# Patient Record
Sex: MALE | Race: WHITE | NOT HISPANIC OR LATINO | Employment: FULL TIME | URBAN - METROPOLITAN AREA
[De-identification: names, ages, dates, MRNs, and addresses within clinical notes are randomized per-mention and may not be internally consistent; named-entity substitution may affect disease eponyms.]

---

## 2018-04-18 ENCOUNTER — COMMUNICATION - HEALTHEAST (OUTPATIENT)
Dept: TELEHEALTH | Facility: CLINIC | Age: 58
End: 2018-04-18

## 2018-05-20 ENCOUNTER — OFFICE VISIT (OUTPATIENT)
Dept: URGENT CARE | Facility: URGENT CARE | Age: 58
End: 2018-05-20
Payer: COMMERCIAL

## 2018-05-20 ENCOUNTER — NURSE TRIAGE (OUTPATIENT)
Dept: NURSING | Facility: CLINIC | Age: 58
End: 2018-05-20

## 2018-05-20 VITALS
OXYGEN SATURATION: 99 % | HEART RATE: 71 BPM | TEMPERATURE: 97.2 F | WEIGHT: 230.8 LBS | DIASTOLIC BLOOD PRESSURE: 100 MMHG | SYSTOLIC BLOOD PRESSURE: 193 MMHG

## 2018-05-20 DIAGNOSIS — L24.9 IRRITANT CONTACT DERMATITIS, UNSPECIFIED TRIGGER: Primary | ICD-10-CM

## 2018-05-20 DIAGNOSIS — I16.0 HYPERTENSIVE URGENCY: ICD-10-CM

## 2018-05-20 PROCEDURE — 99204 OFFICE O/P NEW MOD 45 MIN: CPT | Performed by: PHYSICIAN ASSISTANT

## 2018-05-20 RX ORDER — TRIAMCINOLONE ACETONIDE 1 MG/G
CREAM TOPICAL
Qty: 453 G | Refills: 0 | Status: SHIPPED | OUTPATIENT
Start: 2018-05-20 | End: 2018-12-23

## 2018-05-20 ASSESSMENT — ENCOUNTER SYMPTOMS
HEADACHES: 0
DIARRHEA: 0
VOMITING: 0
ABDOMINAL PAIN: 0
NAUSEA: 0
SHORTNESS OF BREATH: 0
FOCAL WEAKNESS: 0
FEVER: 0
CHILLS: 0

## 2018-05-20 NOTE — PROGRESS NOTES
"HPI  May 20, 2018    HPI: Abhi Pabon is a 57 year old male with hx anxiety who complains of moderate rash to back, neck, arms, & legs onset 6 days ago. Symptoms began after pt sprayed his mattress & sheets with Raid b/c he thought he had bedbugs in his apartment. His apartment was also fumigated & it turned out he did not have bedbugs. Pt has been applying hydrocortisone cream & Vanicream and rash is slowly improving. He is also sleeping on an air mattress with new sheets. Pt reports some itching but not anymore, there is a mild burning/stinging sensation. Denies fever/chills, throat/tongue swelling, HA, CP, SOB, abd pain, N/V/D, rash, or any other symptoms.     Pt's BP elevated today. He took his lisinopril this AM (unsure of dose). He reports usually his BP is very well controlled but he hasn't been as good about taking his medication over the past week since dealing with the above issues. Used to be on lisinopril-HCTZ combo pill but HCTZ was stopped due to \"Kidney damage\". He does have a PCP. He denies dizziness, vision changes, HA, CP, or SOB.    Past Medical History:   Diagnosis Date     Dyslexia      Hypertension      History reviewed. No pertinent surgical history.  Social History   Substance Use Topics     Smoking status: Never Smoker     Smokeless tobacco: Never Used     Alcohol use Yes     Patient Active Problem List   Diagnosis     Hypertension goal BP (blood pressure) < 140/90     Irritant contact dermatitis, unspecified trigger     Eczema, unspecified type     Family History   Problem Relation Age of Onset     Hypertension Mother      Ovarian Cancer Mother      DIABETES Father      Hypertension Father      CANCER Father      Cervical Cancer Sister      Anxiety Disorder Sister      CEREBROVASCULAR DISEASE Sister      HEART DISEASE Sister      Anxiety Disorder Sister         Problem list, Medication list, Allergies, and Medical/Social/Surgical histories reviewed in EPIC and updated as " appropriate.      Review of Systems   Constitutional: Negative for chills and fever.   Respiratory: Negative for shortness of breath.    Cardiovascular: Negative for chest pain.   Gastrointestinal: Negative for abdominal pain, diarrhea, nausea and vomiting.   Skin: Positive for rash.   Neurological: Negative for focal weakness and headaches.   All other systems reviewed and are negative.        Physical Exam   Constitutional: He is oriented to person, place, and time and well-developed, well-nourished, and in no distress.   HENT:   Head: Normocephalic and atraumatic.   Cardiovascular: Normal rate, regular rhythm and normal heart sounds.    Pulmonary/Chest: Effort normal and breath sounds normal.   Musculoskeletal: Normal range of motion.   Neurological: He is alert and oriented to person, place, and time. Gait normal.   Skin: Skin is warm and dry. Rash (dry, flaking skin with excoriation and erythema to bilateral posterior arms, front of neck, and sparingly to anterior lower legs & lower back) noted.   Nursing note and vitals reviewed.      Vital Signs  BP (!) 193/100 (BP Location: Left arm, Patient Position: Chair, Cuff Size: Adult Regular)  Pulse 71  Temp 97.2  F (36.2  C) (Oral)  Wt 230 lb 12.8 oz (104.7 kg)  SpO2 99%     Diagnostic Test Results:  none     ASSESSMENT/PLAN      ICD-10-CM    1. Irritant contact dermatitis, unspecified trigger L24.9 triamcinolone (KENALOG) 0.1 % cream   2. Hypertensive urgency I16.0       Suspect irritant contact dermatitis. Recommend less frequent showers & when do shower use cool or lukewarm water, will Rx triamcinolone, apply Vanicream several times/day.     BP very high- advised pt be seen by PCP ASAP, ideally tomorrow, for evaluation of his BP meds. Unable to make adjustments today b/c pt unaware of dose of lisinopril. He is asymptomatic at this time. Anxiety may be playing a role in his BP also.      I have discussed any lab or imaging results, the patient's diagnosis, and  my plan of treatment with the patient and/or family. Patient is aware to come back in if with worsening symptoms or if no relief despite treatment plan.  Patient voiced understanding and had no further questions.       Follow Up: Return in about 1 day (around 5/21/2018).    AMY Silva, PA-C  [facility]

## 2018-05-20 NOTE — MR AVS SNAPSHOT
After Visit Summary   5/20/2018    Abhi Pabon    MRN: [id number]           Patient Information     Date Of Birth          1960        Visit Information        Provider Department      5/20/2018 4:05 PM Stefanie Velasquez PA-C [facility]        Today's Diagnoses     Irritant contact dermatitis, unspecified trigger    -  1    Benign essential hypertension           Follow-ups after your visit        Follow-up notes from your care team     Return in about 1 day (around 5/21/2018).      Your next 10 appointments already scheduled     May 21, 2018  2:20 PM CDT   Office Visit with Kristopher Cook MD   [facility] [place] ([address] [address] [phone]           Bring a current list of meds and any records pertaining to this visit. For Physicals, please bring immunization records and any forms needing to be filled out. Please arrive 10 minutes early to complete paperwork.            Jun 05, 2018 10:40 AM CDT   Office Visit with Kristopher Cook MD   [facility] [place] ([address] [id number] [phone]           Bring a current list of meds and any records pertaining to this visit. For Physicals, please bring immunization records and any forms needing to be filled out. Please arrive 10 minutes early to complete paperwork.            Jun 19, 2018 10:40 AM CDT   Office Visit with Krsitopher Cook MD   [facility] [place] ([address] [address] [phone]           Bring a current list of meds and any records pertaining to this visit. For Physicals, please bring immunization records and any forms needing to be filled out. Please arrive 10 minutes early to complete paperwork.              Who to contact     If you have questions  "or need follow up information about today's clinic visit or your schedule please contact [facility] directly at [phone].  Normal or non-critical lab and imaging results will be communicated to you by MyChart, letter or phone within 4 business days after the clinic has received the results. If you do not hear from us within 7 days, please contact the clinic through [facility] or phone. If you have a critical or abnormal lab result, we will notify you by phone as soon as possible.  Submit refill requests through [facility] or call your pharmacy and they will forward the refill request to us. Please allow 3 business days for your refill to be completed.          Additional Information About Your Visit        [employer] Information     [facility] lets you send messages to your doctor, view your test results, renew your prescriptions, schedule appointments and more. To sign up, go to [email or web address] . Click on \"Log in\" on the left side of the screen, which will take you to the Welcome page. Then click on \"Sign up Now\" on the right side of the page.     You will be asked to enter the access code listed below, as well as some personal information. Please follow the directions to create your username and password.     Your access code is: [email or web address]  Expires: 2018  5:01 PM     Your access code will  in 90 days. If you need help or a new code, please call your [facility] clinic or [phone].        Care EveryWhere ID     This is your Care EveryWhere ID. This could be used by other organizations to access your [facility] medical records  [id number]        Your Vitals Were     Pulse Temperature Pulse Oximetry             71 97.2  F (36.2  C) (Oral) 99%          Blood Pressure from Last 3 Encounters:   18 (!) 193/100    Weight from Last 3 Encounters:   18 230 lb 12.8 oz (104.7 kg)              Today, you had the following     No orders found for display         Today's Medication " Changes          These changes are accurate as of 5/20/18  5:01 PM.  If you have any questions, ask your nurse or doctor.               Start taking these medicines.        Dose/Directions    triamcinolone 0.1 % cream   Commonly known as:  KENALOG   Used for:  Irritant contact dermatitis, unspecified trigger   Started by:  Stefanie Velasquez PA-C        Apply sparingly to affected area tid prn.   Quantity:  453 g   Refills:  0            Where to get your medicines      These medications were sent to [facility] [place] - [address]  [address], [place] [address]     Phone:  [phone]     triamcinolone 0.1 % cream                Primary Care Provider    None Specified       No primary provider on file.        Equal Access to Services     TRACI PELAYO : Bhargavi hansen Sogeorge, waaxda luqadaha, qaybta kaalmada adeegyada, ti monteiro . So [facility] [phone].    ATENCIÓN: Si habla español, tiene a perez disposición servicios gratuitos de asistencia lingüística. Llame al [phone].    We comply with applicable federal civil rights laws and [place] laws. We do not discriminate on the basis of race, color, national origin, age, disability, sex, sexual orientation, or gender identity.            Thank you!     Thank you for choosing [facility]  for your care. Our goal is always to provide you with excellent care. Hearing back from our patients is one way we can continue to improve our services. Please take a few minutes to complete the written survey that you may receive in the mail after your visit with us. Thank you!             Your Updated Medication List - Protect others around you: Learn how to safely use, store and throw away your medicines at www.disposemymeds.org.          This list is accurate as of 5/20/18  5:01 PM.  Always use your most recent med list.                   Brand Name Dispense  Instructions for use Diagnosis    LISINOPRIL PO           triamcinolone 0.1 % cream    KENALOG    453 g    Apply sparingly to affected area tid prn.    Irritant contact dermatitis, unspecified trigger

## 2018-05-20 NOTE — TELEPHONE ENCOUNTER
Sister is caller. She is not with the patient. Reports that the patient thought that he had bedbugs in his apartment. He sprayed Raid on his mattress and sheets. He then slept in the bed. He developed a rash on his arms, legs and back. His apartment was fumigated. It turns out that he did not have bedbugs. Reports her brother has dyslexia and anxiety. He has signed a form indicating that the caller and another sibling can have access to his medical information for the next year. Reports that she told her brother to bathe and he did. She applied hydrocortisone cream to his rash and she thinks the rash is getting better. All of her brother's bedding has been removed. He is now sleeping on a air mattress with new bedding. Caller said she wants to get medical help for her brother and to request that neurological testing be done. Triage not done, as the caller is not with the patient. She was transferred to the scheduling line.  Laura Jacobo RN/FNA

## 2018-05-21 ENCOUNTER — OFFICE VISIT (OUTPATIENT)
Dept: FAMILY MEDICINE | Facility: CLINIC | Age: 58
End: 2018-05-21
Payer: COMMERCIAL

## 2018-05-21 VITALS
WEIGHT: 230 LBS | BODY MASS INDEX: 32.93 KG/M2 | SYSTOLIC BLOOD PRESSURE: 164 MMHG | DIASTOLIC BLOOD PRESSURE: 79 MMHG | TEMPERATURE: 97.7 F | HEIGHT: 70 IN | HEART RATE: 77 BPM

## 2018-05-21 DIAGNOSIS — Z23 NEED FOR PROPHYLACTIC VACCINATION WITH TETANUS-DIPHTHERIA (TD): ICD-10-CM

## 2018-05-21 DIAGNOSIS — Z11.59 NEED FOR HEPATITIS C SCREENING TEST: ICD-10-CM

## 2018-05-21 DIAGNOSIS — F41.9 ANXIETY: ICD-10-CM

## 2018-05-21 DIAGNOSIS — I10 HYPERTENSION GOAL BP (BLOOD PRESSURE) < 140/90: Primary | ICD-10-CM

## 2018-05-21 DIAGNOSIS — Z12.11 SCREEN FOR COLON CANCER: ICD-10-CM

## 2018-05-21 DIAGNOSIS — L30.9 ECZEMA, UNSPECIFIED TYPE: ICD-10-CM

## 2018-05-21 PROBLEM — L24.9 IRRITANT CONTACT DERMATITIS, UNSPECIFIED TRIGGER: Status: ACTIVE | Noted: 2018-05-21

## 2018-05-21 PROCEDURE — 36415 COLL VENOUS BLD VENIPUNCTURE: CPT | Performed by: FAMILY MEDICINE

## 2018-05-21 PROCEDURE — 80048 BASIC METABOLIC PNL TOTAL CA: CPT | Performed by: FAMILY MEDICINE

## 2018-05-21 PROCEDURE — 99214 OFFICE O/P EST MOD 30 MIN: CPT | Performed by: FAMILY MEDICINE

## 2018-05-21 RX ORDER — AMLODIPINE BESYLATE 5 MG/1
5 TABLET ORAL DAILY
Qty: 90 TABLET | Refills: 3 | Status: SHIPPED | OUTPATIENT
Start: 2018-05-21 | End: 2018-10-18

## 2018-05-21 RX ORDER — LISINOPRIL 20 MG/1
20 TABLET ORAL 2 TIMES DAILY
Qty: 180 TABLET | Refills: 3 | Status: SHIPPED | OUTPATIENT
Start: 2018-05-21 | End: 2018-10-18

## 2018-05-21 RX ORDER — TRIAMCINOLONE ACETONIDE 1 MG/G
CREAM TOPICAL
Qty: 453 G | Refills: 0 | Status: CANCELLED | OUTPATIENT
Start: 2018-05-21

## 2018-05-21 ASSESSMENT — ANXIETY QUESTIONNAIRES
5. BEING SO RESTLESS THAT IT IS HARD TO SIT STILL: NOT AT ALL
1. FEELING NERVOUS, ANXIOUS, OR ON EDGE: SEVERAL DAYS
3. WORRYING TOO MUCH ABOUT DIFFERENT THINGS: SEVERAL DAYS
7. FEELING AFRAID AS IF SOMETHING AWFUL MIGHT HAPPEN: SEVERAL DAYS
GAD7 TOTAL SCORE: 6
2. NOT BEING ABLE TO STOP OR CONTROL WORRYING: SEVERAL DAYS
6. BECOMING EASILY ANNOYED OR IRRITABLE: SEVERAL DAYS

## 2018-05-21 ASSESSMENT — PATIENT HEALTH QUESTIONNAIRE - PHQ9: 5. POOR APPETITE OR OVEREATING: SEVERAL DAYS

## 2018-05-21 NOTE — MR AVS SNAPSHOT
After Visit Summary   5/21/2018    Abhi Pabon    MRN: [id number]           Patient Information     Date Of Birth          1960        Visit Information        Provider Department      5/21/2018 2:20 PM Kristopher Cook MD [facility]        Today's Diagnoses     Hypertension goal BP (blood pressure) < 140/90    -  1    Eczema, unspecified type        Need for hepatitis C screening test        Anxiety        Need for prophylactic vaccination with tetanus-diphtheria (TD)        Screen for colon cancer          Care Instructions    Orders Placed This Encounter     Basic metabolic panel     DERMATOLOGY REFERRAL     Referral Type:   Consultation     amLODIPine (NORVASC) 5 MG tablet     Sig: Take 1 tablet (5 mg) by mouth daily     Dispense:  90 tablet     Refill:  3     lisinopril (PRINIVIL/ZESTRIL) 20 MG tablet     Sig: Take 1 tablet (20 mg) by mouth 2 times daily     Dispense:  180 tablet     Refill:  3     Follow up for physical exam in 3-4 weeks          Follow-ups after your visit        Additional Services     DERMATOLOGY REFERRAL       Your provider has referred you to: N[facility] [phone]   [email or web address]    Please be aware that coverage of these services is subject to the terms and limitations of your health insurance plan.  Call member services at your health plan with any benefit or coverage questions.      Please bring the following with you to your appointment:    (1) Any X-Rays, CTs or MRIs which have been performed.  Contact the facility where they were done to arrange for  prior to your scheduled appointment.    (2) List of current medications  (3) This referral request   (4) Any documents/labs given to you for this referral                  Your next 10 appointments already scheduled     Jun 05, 2018 10:40 AM CDT   Office Visit with Kristopher Cook MD   [facility]  "([facility])    [address] [address] [facility] [address] [phone]           Bring a current list of meds and any records pertaining to this visit. For Physicals, please bring immunization records and any forms needing to be filled out. Please arrive 10 minutes early to complete paperwork.            2018  9:20 AM CDT   PHYSICAL with Kristopher Cook MD   [facility] ([facility]    [address] [address] [facility] [address] [phone]              Who to contact     If you have questions or need follow up information about today's clinic visit or your schedule please contact [facility] directly at [phone].  Normal or non-critical lab and imaging results will be communicated to you by MyChart, letter or phone within 4 business days after the clinic has received the results. If you do not hear from us within 7 days, please contact the clinic through MyChart or phone. If you have a critical or abnormal lab result, we will notify you by phone as soon as possible.  Submit refill requests through [facility] or call your pharmacy and they will forward the refill request to us. Please allow 3 business days for your refill to be completed.          Additional Information About Your Visit        [employer] Information     [facility] lets you send messages to your doctor, view your test results, renew your prescriptions, schedule appointments and more. To sign up, go to [email or web address] . Click on \"Log in\" on the left side of the screen, which will take you to the Welcome page. Then click on \"Sign up Now\" on the right side of the page.     You will be asked to enter the access code listed below, as well as some personal information. Please follow the directions to create your username and password.     Your access code is: [email or web address]  Expires: 2018  5:01 PM     Your access code will  in 90 days. If " "you need help or a new code, please call your [facility] clinic or [phone].        Care EveryWhere ID     This is your Care EveryWhere ID. This could be used by other organizations to access your [facility] medical records  [id number]        Your Vitals Were     Pulse Temperature Height BMI (Body Mass Index)          77 97.7  F (36.5  C) (Oral) 5' 10\" (1.778 m) 33 kg/m2         Blood Pressure from Last 3 Encounters:   05/21/18 164/79   05/20/18 (!) 193/100    Weight from Last 3 Encounters:   05/21/18 230 lb (104.3 kg)   05/20/18 230 lb 12.8 oz (104.7 kg)              We Performed the Following     Basic metabolic panel     DERMATOLOGY REFERRAL          Today's Medication Changes          These changes are accurate as of 5/21/18  3:44 PM.  If you have any questions, ask your nurse or doctor.               Start taking these medicines.        Dose/Directions    amLODIPine 5 MG tablet   Commonly known as:  NORVASC   Used for:  Hypertension goal BP (blood pressure) < 140/90   Started by:  Kristopher Cook MD        Dose:  5 mg   Take 1 tablet (5 mg) by mouth daily   Quantity:  90 tablet   Refills:  3         These medicines have changed or have updated prescriptions.        Dose/Directions    * LISINOPRIL PO   This may have changed:  Another medication with the same name was added. Make sure you understand how and when to take each.   Changed by:  Kristopher Cook MD        Refills:  0       * lisinopril 20 MG tablet   Commonly known as:  PRINIVIL/ZESTRIL   This may have changed:  You were already taking a medication with the same name, and this prescription was added. Make sure you understand how and when to take each.   Used for:  Hypertension goal BP (blood pressure) < 140/90   Changed by:  Kristopher Cook MD        Dose:  20 mg   Take 1 tablet (20 mg) by mouth 2 times daily   Quantity:  180 tablet   Refills:  3       * Notice:  This list has 2 medication(s) that are the same as other " medications prescribed for you. Read the directions carefully, and ask your doctor or other care provider to review them with you.         Where to get your medicines      These medications were sent to [facility] - [place], [place] - [address] [place] [address].  [address] [place] [address]., [facility] [address]     Phone:  [phone]     amLODIPine 5 MG tablet    lisinopril 20 MG tablet                Primary Care Provider Office Phone # Fax #    [facility] [phone]       [address] [place] [address]        Equal Access to Services     TRACI PELAYO : Hadii aad ku hadasho Soomaali, waaxda luqadaha, qaybta kaalmada adeegyada, ti monteiro . So [facility] [phone].    ATENCIÓN: Si habla español, tiene a perez disposición servicios gratuitos de asistencia lingüística. [facility] [phone].    We comply with applicable federal civil rights laws and [place] laws. We do not discriminate on the basis of race, color, national origin, age, disability, sex, sexual orientation, or gender identity.            Thank you!     Thank you for choosing [facility]  for your care. Our goal is always to provide you with excellent care. Hearing back from our patients is one way we can continue to improve our services. Please take a few minutes to complete the written survey that you may receive in the mail after your visit with us. Thank you!             Your Updated Medication List - Protect others around you: Learn how to safely use, store and throw away your medicines at www.disposemymeds.org.          This list is accurate as of 5/21/18  3:44 PM.  Always use your most recent med list.                   Brand Name Dispense Instructions for use Diagnosis    amLODIPine 5 MG tablet    NORVASC    90 tablet    Take 1 tablet (5 mg) by mouth daily    Hypertension goal BP (blood pressure) < 140/90       * LISINOPRIL PO           *  lisinopril 20 MG tablet    PRINIVIL/ZESTRIL    180 tablet    Take 1 tablet (20 mg) by mouth 2 times daily    Hypertension goal BP (blood pressure) < 140/90       triamcinolone 0.1 % cream    KENALOG    453 g    Apply sparingly to affected area tid prn.    Irritant contact dermatitis, unspecified trigger       * Notice:  This list has 2 medication(s) that are the same as other medications prescribed for you. Read the directions carefully, and ask your doctor or other care provider to review them with you.

## 2018-05-21 NOTE — PROGRESS NOTES
SUBJECTIVE:   Abhi Pabon is a 57 year old male who presents to clinic today for the following health issues:      New Patient/Transfer of Care  ED/UC Followup:    Facility:  [facility] [place]   Date of visit: 5/20/18  Reason for visit: Irritant contact dermatitis   Current Status: Kenalog has been helpful, patient feels the burns are improving.      Hypertension Follow-up      Outpatient blood pressures are being checked at home. 130 to 150 of 60's to 90's      Low Salt Diet: not monitoring salt      Amount of exercise or physical activity: None    Problems taking medications regularly: No    Medication side effects: none    Diet: regular (no restrictions)      Abnormal Mood Symptoms  Onset: Has always had it, worsened over the past week     Description:   Depression: no  Anxiety: YES    Accompanying Signs & Symptoms:  Still participating in activities that you used to enjoy: no  Fatigue: YES  Irritability: YES  Difficulty concentrating: YES  Changes in appetite: YES- Not eating enough   Problems with sleep: YES  Heart racing/beating fast : no  Thoughts of hurting yourself or others: none    History:   Recent stress: YES- apartment currently has bed bugs, patient has moved   Prior depression hospitalization: None  Family history of depression: YES  Family history of anxiety: YES - Sisters      Precipitating factors:   Alcohol/drug use: no     Alleviating factors:  Nothing     Therapies Tried and outcome: None         Problem list and histories reviewed & adjusted, as indicated.  Additional history: as documented    Patient here with his sisters with multiple concerns. He is notinf significant problems with bed bugs. He has had his apaartmetn treated multiple times and per sisters is clear of bed bugs. He had sprayed himself with repellents and apparently developed a contact dermatitis. It it improving with topical steroids. Discussion with patient and sisters makes it also apparent  "that his concerns my not be due to persistent bed bugs and one sister noted that there have not been any bed bug. He does have a hx of eczema and chronic skin problems. He does have a hx of anxiety. He also states he has not slept well due to his fear of the bed bugs.  We did discuss that his anxiety may be creating the problem buthe is convince he had bed bugs.     Exam today was more consistent with Eczema but he was not reassured. We discussed a dermatolgy referral and he was agreeable to the referral.     We also discussed avoidance of the insecticides due to his reaction and potential concern for the high doses he was using.     Reviewed and updated as needed this visit by clinical staff       Reviewed and updated as needed this visit by Provider         ROS:  Constitutional, HEENT, cardiovascular, pulmonary, GI, , musculoskeletal, neuro, skin, endocrine and psych systems are negative, except as otherwise noted.    OBJECTIVE:     /79 (BP Location: Left arm, Cuff Size: Adult Large)  Pulse 77  Temp 97.7  F (36.5  C) (Oral)  Ht 5' 10\" (1.778 m)  Wt 230 lb (104.3 kg)  BMI 33 kg/m2  Body mass index is 33 kg/(m^2).   GENERAL: alert, no distress and ansiety  NECK: no adenopathy, no asymmetry, masses, or scars and thyroid normal to palpation  RESP: lungs clear to auscultation - no rales, rhonchi or wheezes  CV: regular rate and rhythm, normal S1 S2, no S3 or S4, no murmur, click or rub, no peripheral edema and peripheral pulses strong  ABDOMEN: soft, nontender, no hepatosplenomegaly, no masses and bowel sounds normal  MS: no gross musculoskeletal defects noted, no edema  Skin: eczematous patches on chest, back arms and legs. No obvious insect bits  Diagnostic Test Results:  none     ASSESSMENT:       PLAN:   (I10) Hypertension goal BP (blood pressure) < 140/90  (primary encounter diagnosis)  Comment: controlled  Plan: Basic metabolic panel, amLODIPine (NORVASC) 5         MG tablet, lisinopril " (PRINIVIL/ZESTRIL) 20 MG         tablet            (L30.9) Eczema, unspecified type  Comment: patient feels his skin concerns are from bed bugs.. It appears he has a phobia  Plan: DERMATOLOGY REFERRAL        He will follow up after eval and we will review his anxiety.. consider counseling for anxiety        (F41.9) Anxiety  Comment: possible phobia   Plan: follow up for further review and possible referral        25 min spent with patient and sisters >50% in review and counseling      Patient Instructions     Orders Placed This Encounter     Basic metabolic panel     DERMATOLOGY REFERRAL     Referral Type:   Consultation     amLODIPine (NORVASC) 5 MG tablet     Sig: Take 1 tablet (5 mg) by mouth daily     Dispense:  90 tablet     Refill:  3     lisinopril (PRINIVIL/ZESTRIL) 20 MG tablet     Sig: Take 1 tablet (20 mg) by mouth 2 times daily     Dispense:  180 tablet     Refill:  3     Follow up for physical exam in 3-4 weeks      Kristopher Cook MD, MD  [facility]

## 2018-05-21 NOTE — PATIENT INSTRUCTIONS
Orders Placed This Encounter     Basic metabolic panel     DERMATOLOGY REFERRAL     Referral Type:   Consultation     amLODIPine (NORVASC) 5 MG tablet     Sig: Take 1 tablet (5 mg) by mouth daily     Dispense:  90 tablet     Refill:  3     lisinopril (PRINIVIL/ZESTRIL) 20 MG tablet     Sig: Take 1 tablet (20 mg) by mouth 2 times daily     Dispense:  180 tablet     Refill:  3     Follow up for physical exam in 3-4 weeks

## 2018-05-21 NOTE — LETTER
[facility]  [address] [place]  [address]  [phone]        May 23, 2018    Abhi Pabon  [address] [place] [address]        Dear Abhi,    The results of your recent lab tests were within normal limits. The blood sugar was slightly elevated but you were not fasting for this test so it is normal. We will review at your physical exam.  Enclosed is a copy of these results.  If you have any further questions or problems, please contact our office.     Results for orders placed or performed in visit on 05/21/18   Basic metabolic panel   Result Value Ref Range    Sodium 136 133 - 144 mmol/L    Potassium 4.3 3.4 - 5.3 mmol/L    Chloride 102 94 - 109 mmol/L    Carbon Dioxide 28 20 - 32 mmol/L    Anion Gap 6 3 - 14 mmol/L    Glucose 115 (H) 70 - 99 mg/dL    Urea Nitrogen 8 7 - 30 mg/dL    Creatinine 0.82 0.66 - 1.25 mg/dL    GFR Estimate >90 >60 mL/min/1.7m2    GFR Estimate If Black >90 >60 mL/min/1.7m2    Calcium 9.7 8.5 - 10.1 mg/dL       If you have any questions please call the clinic at [phone].    Sincerely,    Kristopher FISHL

## 2018-05-22 LAB
ANION GAP SERPL CALCULATED.3IONS-SCNC: 6 MMOL/L (ref 3–14)
BUN SERPL-MCNC: 8 MG/DL (ref 7–30)
CALCIUM SERPL-MCNC: 9.7 MG/DL (ref 8.5–10.1)
CHLORIDE SERPL-SCNC: 102 MMOL/L (ref 94–109)
CO2 SERPL-SCNC: 28 MMOL/L (ref 20–32)
CREAT SERPL-MCNC: 0.82 MG/DL (ref 0.66–1.25)
GFR SERPL CREATININE-BSD FRML MDRD: >90 ML/MIN/1.7M2
GLUCOSE SERPL-MCNC: 115 MG/DL (ref 70–99)
POTASSIUM SERPL-SCNC: 4.3 MMOL/L (ref 3.4–5.3)
SODIUM SERPL-SCNC: 136 MMOL/L (ref 133–144)

## 2018-05-22 ASSESSMENT — ANXIETY QUESTIONNAIRES: GAD7 TOTAL SCORE: 6

## 2018-06-01 ENCOUNTER — TELEPHONE (OUTPATIENT)
Dept: FAMILY MEDICINE | Facility: CLINIC | Age: 58
End: 2018-06-01

## 2018-06-01 DIAGNOSIS — F41.9 ANXIETY: Primary | ICD-10-CM

## 2018-06-01 DIAGNOSIS — Z80.0 FAMILY HISTORY OF COLON CANCER: ICD-10-CM

## 2018-06-01 NOTE — TELEPHONE ENCOUNTER
Reason for Call:  Other patient having issues     Detailed comments: patient's sister calling - wanting to speak with Rn about patient's recent behavior. She was very vague and didn't want to tell me anything in any detail. The information given to TC was patient is stuggling with ocd and anxiety issues.     Phone Number Patient can be reached at:  [phone]    Best Time: any - if RN leaves , please give Samira a good time to call back clinic    Can we leave a detailed message on this number? YES    Call taken on 6/1/2018 at 11:21 AM by NICHOL MITCHELL

## 2018-06-01 NOTE — TELEPHONE ENCOUNTER
"Routing to PCP as HALI.    Called and spoke with Samira. She wants to give some information to Dr. Cook because patient wants to go to appt alone for his follow up appt. She wants to be respectful of his privacy and dignity, but is worried about him. She says that he is having ongoing anxiety and OCD, he keeps thinking he has bed bugs but exterminators have been out 5-6 times and says that his house is clear. He hasn't been staying at his house because he thinks he's getting bitten. He still believes there are bed bugs. She thinks he may be on the spectrum but has never been diagnosed. She doesn't think he's a vulnerable adult,  but he needs a lot of guidance and support. She says that patient may need things explained to him more in depth than may be needed for other patients. The anxiety really started after mother  unexpectedly.  A family member is being treated with medication for anxiety and she wonders if this would be helpful for patient.    She's worried that the anxiety is really affecting his health. He's been having blood pressure spikes with this anxiety. BP was >200/100 on  and went to ER and was kept overnight for observation. Mother had a heart attack and sister  of a rare heart disease so she is concerned about this.    For several years he's been going to chiropractor, Kristopher Freire in [place]. He's been charging him tens of thousands of dollars promising him [facility] health. The chiropractor has said \"I'm the only one who knows what's wrong with you, I'm saving your life and I'm filled with the Holy Spirit, real doctors won't find anything but I will etc.\" He told patient that his nutritionist is a witch. She is concerned that he's being taken advantage of and is really glad that patient has agreed to finally see a medical doctor.    Dr. Cook can call Samira anytime with questions or concerns.   "

## 2018-06-05 ENCOUNTER — OFFICE VISIT (OUTPATIENT)
Dept: FAMILY MEDICINE | Facility: CLINIC | Age: 58
End: 2018-06-05
Payer: COMMERCIAL

## 2018-06-05 VITALS
BODY MASS INDEX: 32.35 KG/M2 | SYSTOLIC BLOOD PRESSURE: 138 MMHG | HEART RATE: 68 BPM | WEIGHT: 226 LBS | HEIGHT: 70 IN | TEMPERATURE: 96.6 F | DIASTOLIC BLOOD PRESSURE: 86 MMHG

## 2018-06-05 DIAGNOSIS — L30.9 ECZEMA, UNSPECIFIED TYPE: Primary | ICD-10-CM

## 2018-06-05 DIAGNOSIS — I10 HYPERTENSION GOAL BP (BLOOD PRESSURE) < 140/90: ICD-10-CM

## 2018-06-05 DIAGNOSIS — L24.9 IRRITANT CONTACT DERMATITIS, UNSPECIFIED TRIGGER: ICD-10-CM

## 2018-06-05 PROBLEM — Z80.0 FAMILY HISTORY OF COLON CANCER: Status: ACTIVE | Noted: 2018-06-05

## 2018-06-05 PROBLEM — F41.9 ANXIETY: Status: ACTIVE | Noted: 2018-06-05

## 2018-06-05 PROCEDURE — 99214 OFFICE O/P EST MOD 30 MIN: CPT | Performed by: FAMILY MEDICINE

## 2018-06-05 ASSESSMENT — ANXIETY QUESTIONNAIRES
GAD7 TOTAL SCORE: 2
1. FEELING NERVOUS, ANXIOUS, OR ON EDGE: SEVERAL DAYS
3. WORRYING TOO MUCH ABOUT DIFFERENT THINGS: SEVERAL DAYS
2. NOT BEING ABLE TO STOP OR CONTROL WORRYING: NOT AT ALL
6. BECOMING EASILY ANNOYED OR IRRITABLE: NOT AT ALL
7. FEELING AFRAID AS IF SOMETHING AWFUL MIGHT HAPPEN: NOT AT ALL
5. BEING SO RESTLESS THAT IT IS HARD TO SIT STILL: NOT AT ALL

## 2018-06-05 ASSESSMENT — PATIENT HEALTH QUESTIONNAIRE - PHQ9: 5. POOR APPETITE OR OVEREATING: NOT AT ALL

## 2018-06-05 NOTE — PATIENT INSTRUCTIONS
-compile dates you've missed due to medical conditions. It looks like you were admitted on Nov 17th for pneumonia, and then again on April 17th. You then went to  on May 20th.     Follow up the end of summer for a full physical     [facility]   Discharged by : Colette Calhoun MA    Paper scripts provided to patient : no   If you have any questions regarding to your visit please contact your care team:     Team Silver              Clinic Hours Telephone Number     Dr. Umesh Shine PA-C   7am-7pm  Monday - Thursday   7am-5pm  Fridays  [phone]   (Appointment scheduling available 24/7)     RN Line  [phone] option 2     Urgent Care - [place] and [facility] - 11am-9pm Monday-Friday Saturday-Sunday- 9am-5pm     [place] -   5pm-9pm Monday-Friday Saturday-Sunday- 9am-5pm    [phone] - [place]    [phone] - [place]     For a Price Quote for your services, please call our Consumer Price Line at [phone].     What options do I have for visits at the clinic other than the traditional office visit?     To expand how we care for you, many of our providers are utilizing electronic visits (e-visits) and telephone visits, when medically appropriate, for interactions with their patients rather than a visit in the clinic. We also offer nurse visits for many medical concerns. Just like any other service, we will bill your insurance company for this type of visit based on time spent on the phone with your provider. Not all insurance companies cover these visits. Please check with your medical insurance if this type of visit is covered. You will be responsible for any charges that are not paid by your insurance.   E-visits via 10Six: generally incur a $35.00 fee.     Telephone visits:   Time spent on the phone: *charged based on time that is spent on the phone in increments of 10 minutes. Estimated cost:   5-10  mins $30.00   11-20 mins. $59.00   21-30 mins. $85.00     Use "[employer]t (secure email communication and access to your chart) to send your primary care provider a message or make an appointment. Ask someone on your Team how to sign up for [email or web address].     As always, Thank you for trusting us with your health care needs!      [facility]:    Scheduling Appointments  [facility]  Call: [phone]    [facility]  Call: [phone]    [facility]  Call: [phone]    For Gastroenterology referrals   [facility]   Clinics and Surgery Center, 4th Floor   [address] [place] [address]   Appointments: [phone]    WHERE TO GO FOR CARE?  Clinic    Make an appointment if you:       Are sick (cold, cough, flu, sore throat, earache or in pain).       Have a small injury (sprain, small cut, burn or broken bone).       Need a physical exam, Pap smear, vaccine or prescription refill.       Have questions about your health or medicines.    To reach us:      Call [phone] ([phone]). Open 24 hours every day. (For counseling services, call [phone].)    Log into [email or web address] at [email or web address]. (Visit [email or web address] to create an account.) Hospital emergency room    An emergency is a serious or life- threatening problem that must be treated right away.    Call [phone] or get to the hospital if you have:      Very bad or sudden:            - Chest pain or pressure         - Bleeding         - Head or belly pain         - Dizziness or trouble seeing, walking or                          Speaking      Problems breathing      Blood in your vomit or you are coughing up blood      A major injury (knocked out, loss of a finger or limb, rape, broken bone protruding from skin)    A mental health crisis. (Or call the Mental Health Crisis line at 1-315.903.8916 or Suicide Prevention Hotline at  3-606-598-8474.)    Open 24 hours every day. You don't need an appointment.     Urgent care    Visit urgent care for sickness or small injuries when the clinic is closed. You don't need an appointment. To check hours or find an urgent care near you, visit [email or web address]. Online care    Get online care from [facility] for more than 70 common problems, like colds, allergies and infections. Open 24 hours every day at:   [email or web address]   Need help deciding?    For advice about where to be seen, you may call your clinic and ask to speak with a nurse. We're here for you 24 hours every day.         If you are deaf or hard of hearing, please let us know. We provide many free services including sign language interpreters, oral interpreters, TTYs, telephone amplifiers, note takers and written materials.

## 2018-06-05 NOTE — MR AVS SNAPSHOT
After Visit Summary   6/5/2018    Abhi Pabon    MRN: [id number]           Patient Information     Date Of Birth          1960        Visit Information        Provider Department      6/5/2018 10:40 AM Kristopher Cook MD [facility]        Today's Diagnoses     Eczema, unspecified type    -  1    Irritant contact dermatitis, unspecified trigger        Hypertension goal BP (blood pressure) < 140/90          Care Instructions    -compile dates you've missed due to medical conditions. It looks like you were admitted on Nov 17th for pneumonia, and then again on April 17th. You then went to  on May 20th.     Follow up the end of summer for a full physical     [facility]   Discharged by : Colette Calhoun MA    Paper scripts provided to patient : no   If you have any questions regarding to your visit please contact your care team:     Team Silver              Clinic Hours Telephone Number     Dr. Umesh Shine PA-C   7am-7pm  Monday - Thursday   7am-5pm  Fridays  [phone]   (Appointment scheduling available 24/7)     RN Line  [phone] option 2     Urgent Care - [place] and [place] - 11am-9pm Monday-Friday Saturday-Sunday- 9am-5pm     [place] -   5pm-9pm Monday-Friday Saturday-Sunday- 9am-5pm    [phone] - [place]    [phone] - [place]     For a Price Quote for your services, please call our Consumer Price Line at [phone].     What options do I have for visits at the clinic other than the traditional office visit?     To expand how we care for you, many of our providers are utilizing electronic visits (e-visits) and telephone visits, when medically appropriate, for interactions with their patients rather than a visit in the clinic. We also offer nurse visits for many medical concerns. Just like any other service, we will bill your insurance  company for this type of visit based on time spent on the phone with your provider. Not all insurance companies cover these visits. Please check with your medical insurance if this type of visit is covered. You will be responsible for any charges that are not paid by your insurance.   E-visits via Digital Vault: generally incur a $35.00 fee.     Telephone visits:   Time spent on the phone: *charged based on time that is spent on the phone in increments of 10 minutes. Estimated cost:   5-10 mins $30.00   11-20 mins. $59.00   21-30 mins. $85.00     Use Digital Vault (secure email communication and access to your chart) to send your primary care provider a message or make an appointment. Ask someone on your Team how to sign up for Digital Vault.     As always, Thank you for trusting us with your health care needs!      [facility]:    Scheduling Appointments  [facility]  Call: [phone]    [facility]  Call: [phone]    [facility]  Call: [phone]    For Gastroenterology referrals   [facility]   Clinics and Surgery Center, 4th Floor   [address] [place] [address]   Appointments: [phone]    WHERE TO GO FOR CARE?  Clinic    Make an appointment if you:       Are sick (cold, cough, flu, sore throat, earache or in pain).       Have a small injury (sprain, small cut, burn or broken bone).       Need a physical exam, Pap smear, vaccine or prescription refill.       Have questions about your health or medicines.    To reach us:      Call [phone] ([phone]). Open 24 hours every day. (For counseling services, call [phone].)    Log into Digital Vault at [email or web address]. (Visit Think Through [email or web address] to create an account.) Hospital emergency room    An emergency is a serious or life- threatening problem that must be treated right away.    Call 404 or get to the hospital if you have:      Very bad  or sudden:            - Chest pain or pressure         - Bleeding         - Head or belly pain         - Dizziness or trouble seeing, walking or                          Speaking      Problems breathing      Blood in your vomit or you are coughing up blood      A major injury (knocked out, loss of a finger or limb, rape, broken bone protruding from skin)    A mental health crisis. (Or call the Mental Health Crisis line at 1-141.535.8346 or Suicide Prevention Hotline at 1-549.587.8229.)    Open 24 hours every day. You don't need an appointment.     Urgent care    Visit urgent care for sickness or small injuries when the clinic is closed. You don't need an appointment. To check hours or find an urgent care near you, visit [email or web address]. Online care    Get online care from [facility] for more than 70 common problems, like colds, allergies and infections. Open 24 hours every day at:   [email or web address]   Need help deciding?    For advice about where to be seen, you may call your clinic and ask to speak with a nurse. We're here for you 24 hours every day.         If you are deaf or hard of hearing, please let us know. We provide many free services including sign language interpreters, oral interpreters, TTYs, telephone amplifiers, note takers and written materials.               Follow-ups after your visit        Your next 10 appointments already scheduled     Jun 12, 2018  9:20 AM CDT   PHYSICAL with Kristopher Cook MD   [facility]    [address] [place] [id number] [phone]              Who to contact     If you have questions or need follow up information about today's clinic visit or your schedule please contact [facility] directly at [phone].  Normal or non-critical lab and imaging results will be communicated to you by MyChart, letter or phone within 4 business days after the clinic has received the results. If you  "do not hear from us within 7 days, please contact the clinic through Pontaba or phone. If you have a critical or abnormal lab result, we will notify you by phone as soon as possible.  Submit refill requests through Pontaba or call your pharmacy and they will forward the refill request to us. Please allow 3 business days for your refill to be completed.          Additional Information About Your Visit        [facility] Information     Pontaba lets you send messages to your doctor, view your test results, renew your prescriptions, schedule appointments and more. To sign up, go to [email or web address] . Click on \"Log in\" on the left side of the screen, which will take you to the Welcome page. Then click on \"Sign up Now\" on the right side of the page.     You will be asked to enter the access code listed below, as well as some personal information. Please follow the directions to create your username and password.     Your access code is: [email or web address]  Expires: 2018  5:01 PM     Your access code will  in 90 days. If you need help or a new code, please call your [place] clinic or [phone].        Care EveryWhere ID     This is your Care EveryWhere ID. This could be used by other organizations to access your [place] medical records  [id number]        Your Vitals Were     Pulse Temperature Height BMI (Body Mass Index)          68 96.6  F (35.9  C) (Tympanic) 5' 10\" (1.778 m) 32.43 kg/m2         Blood Pressure from Last 3 Encounters:   18 138/86   18 164/79   18 (!) 193/100    Weight from Last 3 Encounters:   18 226 lb (102.5 kg)   18 230 lb (104.3 kg)   18 230 lb 12.8 oz (104.7 kg)              Today, you had the following     No orders found for display       Primary Care Provider Office Phone # Fax #    [facility] [phone]       [address] [facility] [address]        Equal Access to Services     TRACI PELAYO AH: Bhargavi apple " maria esther Betancur, watheodorada luqadaha, qaybta kaalmada paty, ti jessicain hayaazane josuebebeto sandoval laromezane beatriz. So [facility] [phone].    ATENCIÓN: Si habla isabelle, tiene a perez disposición servicios gratuitos de asistencia lingüística. Abhay al [phone].    We comply with applicable federal civil rights laws and [place] laws. We do not discriminate on the basis of race, color, national origin, age, disability, sex, sexual orientation, or gender identity.            Thank you!     Thank you for choosing [facility]  for your care. Our goal is always to provide you with excellent care. Hearing back from our patients is one way we can continue to improve our services. Please take a few minutes to complete the written survey that you may receive in the mail after your visit with us. Thank you!             Your Updated Medication List - Protect others around you: Learn how to safely use, store and throw away your medicines at www.disposemymeds.org.          This list is accurate as of 6/5/18 11:29 AM.  Always use your most recent med list.                   Brand Name Dispense Instructions for use Diagnosis    amLODIPine 5 MG tablet    NORVASC    90 tablet    Take 1 tablet (5 mg) by mouth daily    Hypertension goal BP (blood pressure) < 140/90       lisinopril 20 MG tablet    PRINIVIL/ZESTRIL    180 tablet    Take 1 tablet (20 mg) by mouth 2 times daily    Hypertension goal BP (blood pressure) < 140/90       triamcinolone 0.1 % cream    KENALOG    453 g    Apply sparingly to affected area tid prn.    Irritant contact dermatitis, unspecified trigger

## 2018-06-05 NOTE — TELEPHONE ENCOUNTER
Called and reviewed todays visit with Abhi'    She would recommend earlier follow up than July 23rd given his difficulty not following through    Also reported family history of colon cancer and polyp so HM is important.     Called and left message to make travon appt. End of June would be best.

## 2018-06-05 NOTE — PROGRESS NOTES
SUBJECTIVE:   Abhi Pabon is a 57 year old male who presents to clinic today for the following health issues:      Anxiety Follow-Up    Status since last visit: Improved - He has been getting more sleep    Other associated symptoms:None    Complicating factors:   Significant life event: Yes-  He moved back into his old apartment yesterday after they had treated for bed bugs.      Current substance abuse: None  Depression symptoms: No  AXEL-7 SCORE 5/21/2018   Total Score 6       AXEL-7    Amount of exercise or physical activity: None    Problems taking medications regularly: No    Medication side effects: Fatigue     Diet: regular (no restrictions)    Stopped using triamcinolone because he got a different cream (OTC rejuvinate). He hasn't seen dermatology. He has stopped using sprays on himself. He has had his place fumigated three times. He notes that he has been sleeping better.     PHQ-9 score of 5.            Problem list and histories reviewed & adjusted, as indicated.  Additional history: as documented    Patient Active Problem List   Diagnosis     Hypertension goal BP (blood pressure) < 140/90     Irritant contact dermatitis, unspecified trigger     Eczema, unspecified type     History reviewed. No pertinent surgical history.    Social History   Substance Use Topics     Smoking status: Never Smoker     Smokeless tobacco: Never Used     Alcohol use Yes     Family History   Problem Relation Age of Onset     Hypertension Mother      Ovarian Cancer Mother      DIABETES Father      Hypertension Father      CANCER Father      Cervical Cancer Sister      Anxiety Disorder Sister      CEREBROVASCULAR DISEASE Sister      HEART DISEASE Sister      Anxiety Disorder Sister          Current Outpatient Prescriptions   Medication Sig Dispense Refill     amLODIPine (NORVASC) 5 MG tablet Take 1 tablet (5 mg) by mouth daily 90 tablet 3     lisinopril (PRINIVIL/ZESTRIL) 20 MG tablet Take 1 tablet (20 mg) by mouth 2  "times daily 180 tablet 3     triamcinolone (KENALOG) 0.1 % cream Apply sparingly to affected area tid prn. (Patient not taking: Reported on 6/5/2018) 453 g 0     [DISCONTINUED] LISINOPRIL PO        Allergies   Allergen Reactions     Penicillins Unknown     Recent Labs   Lab Test  05/21/18   1546   CR  0.82   GFRESTIMATED  >90   GFRESTBLACK  >90   POTASSIUM  4.3      BP Readings from Last 3 Encounters:   06/05/18 142/86   05/21/18 164/79   05/20/18 (!) 193/100    Wt Readings from Last 3 Encounters:   06/05/18 102.5 kg (226 lb)   05/21/18 104.3 kg (230 lb)   05/20/18 104.7 kg (230 lb 12.8 oz)                  Labs reviewed in EPIC    Reviewed and updated as needed this visit by clinical staff       Reviewed and updated as needed this visit by Provider         ROS:  Constitutional, HEENT, cardiovascular, pulmonary, GI, , musculoskeletal, neuro, skin, endocrine and psych systems are negative, except as otherwise noted.    This document serves as a record of the services and decisions personally performed and made by Umesh Cook MD. It was created on their behalf by Vaughn Decker, a trained medical scribe. The creation of this document is based the provider's statements to the medical scribe.  Vaughn Decker June 5, 2018 11:06 AM       OBJECTIVE:     /86 (Cuff Size: Adult Large)  Pulse 68  Temp 96.6  F (35.9  C) (Tympanic)  Ht 1.778 m (5' 10\")  Wt 102.5 kg (226 lb)  BMI 32.43 kg/m2  Body mass index is 32.43 kg/(m^2).  GENERAL: alert and no distress  HENT: ear canals and TM's normal, nose and mouth without ulcers or lesions  NECK: no adenopathy, no asymmetry, masses, or scars and thyroid normal to palpation  RESP: lungs clear to auscultation - no rales, rhonchi or wheezes  CV: regular rate and rhythm, normal S1 S2, no S3 or S4, no murmur, click or rub  MS: no gross musculoskeletal defects noted, no edema  SKIN:eczematous rash on left inner thigh  PSYCH: anxious, improved from previous visits    Diagnostic Test " Results:  none     ASSESSMENT/PLAN:   (L30.9) Eczema, unspecified type  (primary encounter diagnosis)  Comment: improved. Patient education done on ways to discriminate between bug bites and eczema. Nevertheless, patient not entirely convinced that eczematous rashes aren't bug bites.   Plan: -continue to use triamcinolone 0.1% PRN           -lubricating ointments after showers      (L24.9) Irritant contact dermatitis, unspecified trigger  Comment:   Plan:     (I10) Hypertension goal BP (blood pressure) < 140/90  Comment: /86, pt currently taking amlodipine 5 mg QD and lisinopril 20 mg BID. Appears to be well controlled.     Plan: -continue present medications.     Patient has missed work due to several medical-related emergencies. He will need FMLA form signed. Instructed patient to compile dates missed so that we can document all of that information as part of his FMLA.     25 min spent with patient >50% in review and counseling about rash, bug bites and anxiety surrounding these concerns. Also reviewed working with complimentary medicine providers. Monitor cost concerns and offered review of work up.    The information in this document, created by the medical scribe for me, accurately reflects the services I personally performed and the decisions made by me. I have reviewed and approved this document for accuracy prior to leaving the patient care area.    Kristopher Cook MD, MD  [facility]

## 2018-06-06 ASSESSMENT — ANXIETY QUESTIONNAIRES: GAD7 TOTAL SCORE: 2

## 2018-06-18 ENCOUNTER — TELEPHONE (OUTPATIENT)
Dept: FAMILY MEDICINE | Facility: CLINIC | Age: 58
End: 2018-06-18

## 2018-06-18 NOTE — TELEPHONE ENCOUNTER
Date forms received: 6-18-18  Form completed as much as possible by NICHOL MITCHELL.  Forms placed: Provider folder Date placed: 6-18-18    Isaura Padgett

## 2018-06-18 NOTE — TELEPHONE ENCOUNTER
"Reason for Call:  Form, our goal is to have forms completed with 72 hours, however, some forms may require a visit or additional information.    Type of letter, form or note:  FMLA    Who is the form from?: [employer] (if other please explain)    Where did the form come from: Patient or family brought in       What clinic location was the form placed at?: [place] ([place])    Where the form was placed: 's Box    What number is listed as a contact on the form?: [phone] (home)          Additional comments: Please fax the forms to Megan [employer] at [phone].  Patient states; \"Dr Cook, I called in sick to [place] with high blood pressure.  6 episodes in the last 20 days.  Been dealing with stressful bugs.  I need the form by 6/19/18.  Please call me at [phone] to coordinate information for FMLA if need be.  Thank you!\"    Call taken on 6/18/2018 at 9:46 AM by Ade Reid        "

## 2018-06-18 NOTE — TELEPHONE ENCOUNTER
Called patient, he said that forms are actually due tomorrow, 6/19/18.  He said he has had a really hard time dealing with [employer].  I told him about the appointment Wednesday at 2:20 and he asked me to schedule it for him.  He is going to call [employer] and let them know that he will be seeing Dr. Cook to complete the forms on Wednesday.    Patient will call if there are any other issues.    Will route to Dr. Cook for FYI.    Aurea Trinh, Certified Medical Assistant (AAMA)

## 2018-06-18 NOTE — TELEPHONE ENCOUNTER
There are many parts of the form that it would be best to review with him. Please make a follow up appt. Ok to use end of day appt     Wed at 2:20 would work.     Umesh Cook MD

## 2018-06-19 NOTE — TELEPHONE ENCOUNTER
Patient has appt on 6/20/18 at 2:20 pm with Dr. Cook.    Form put into MA pending folder.    Cassidy Small, CMA

## 2018-06-20 ENCOUNTER — OFFICE VISIT (OUTPATIENT)
Dept: FAMILY MEDICINE | Facility: CLINIC | Age: 58
End: 2018-06-20
Payer: COMMERCIAL

## 2018-06-20 VITALS
DIASTOLIC BLOOD PRESSURE: 89 MMHG | TEMPERATURE: 97.6 F | BODY MASS INDEX: 32.84 KG/M2 | HEIGHT: 70 IN | HEART RATE: 80 BPM | SYSTOLIC BLOOD PRESSURE: 142 MMHG | WEIGHT: 229.38 LBS

## 2018-06-20 DIAGNOSIS — I10 HYPERTENSION GOAL BP (BLOOD PRESSURE) < 140/90: Primary | ICD-10-CM

## 2018-06-20 PROCEDURE — 99214 OFFICE O/P EST MOD 30 MIN: CPT | Performed by: FAMILY MEDICINE

## 2018-06-20 ASSESSMENT — ANXIETY QUESTIONNAIRES
3. WORRYING TOO MUCH ABOUT DIFFERENT THINGS: NOT AT ALL
6. BECOMING EASILY ANNOYED OR IRRITABLE: NOT AT ALL
7. FEELING AFRAID AS IF SOMETHING AWFUL MIGHT HAPPEN: NOT AT ALL
GAD7 TOTAL SCORE: 1
5. BEING SO RESTLESS THAT IT IS HARD TO SIT STILL: NOT AT ALL
1. FEELING NERVOUS, ANXIOUS, OR ON EDGE: SEVERAL DAYS
2. NOT BEING ABLE TO STOP OR CONTROL WORRYING: NOT AT ALL

## 2018-06-20 ASSESSMENT — PATIENT HEALTH QUESTIONNAIRE - PHQ9: 5. POOR APPETITE OR OVEREATING: NOT AT ALL

## 2018-06-20 NOTE — PATIENT INSTRUCTIONS
Dietary Approaches to Stop Hypertension (The DASH Diet)   What is hypertension?   Hypertension is blood pressure that keeps being higher than normal. Blood pressure is the force of blood against artery walls as the heart pumps blood through the body. Blood pressure can be unhealthy if it is above 120/80. The higher your blood pressure, the greater the health risk.   High blood pressure can be controlled if you take these steps:   Maintain a healthy weight.   Are physically active.   Follow a healthy eating plan, which includes foods that do not have a lot of salt and sodium.   Do not drink a lot of alcohol.   Diet affects high blood pressure. Following the DASH diet and reducing the amount of sodium in your diet will help lower your blood pressure. It will also help prevent high blood pressure.   What is the DASH diet?   Dietary Approaches to Stop Hypertension (DASH) is a diet that is low in saturated fat, cholesterol, and total fat. It emphasizes fruits, vegetables, and low-fat dairy foods. The DASH diet also includes whole-grain products, fish, poultry, and nuts. It encourages fewer servings of red meat, sweets, and sugar-containing beverages. It is rich in magnesium, potassium, and calcium, as well as protein and fiber.   How do I get started on the DASH diet?   The DASH diet requires no special foods and has no hard-to-follow recipes. Start by seeing how DASH compares with your current eating habits.   The DASH eating plan illustrated below is based on a diet of 2,000 calories a day. Your healthcare provider or a dietitian can help you determine how many calories a day you need. Most adults need somewhere between 1600 and 2800 calories a day. Serving sizes for different foods vary from 1/2 cup to 1 and 1/4 cups. Check product nutrition labels for serving sizes and the number of calories per serving.                      Number of        Examples of  Food Group      servings          serving size  ----------------------------------------------------------------    Grains and      7 to 8 per day   1 slice of bread  grain products                   1 cup ready-to-eat cold cereal                                   1/2 cup cooked rice, pasta,                                   or cereal    Vegetables      4 to 5 per day   1 cup raw leafy vegetable                                   1/2 cup cooked vegetable                                   6 ounces (oz) vegetable juice      Fruits          4 to 5 per day   1 medium fruit                                   1/4 cup dried fruit                                   1/2 cup fresh, frozen, or                                   canned fruit                                   6 oz fruit juice      Low-fat or      2 to 3 per day   8 oz milk  fat-free                         1 cup yogurt  dairy foods                      1 and 1/2 oz cheese    Lean meats,  poultry,        2 or fewer per   3 oz cooked lean meat,  or fish         day              skinless poultry, or fish    Nuts, seeds,    4 to 5 per week  1/3 cup or 1 and 1/2 oz nuts  and dry beans                    1 tablespoon or 1/2 oz seeds                                   1/2 cup cooked dry beans    Fats and oils   2 to 3 per day   1 teaspoon soft margarine                                   1 tablespoon low-fat mayonnaise                                   2 tablespoons light salad                                   dressing                                   1 teaspoon vegetable oil    Sweets          5 per week       1 tablespoon sugar                                   1 tablespoon jelly or jam                                   1/2 oz jelly beans                                   8 oz lemonade  ----------------------------------------------------------------  Make changes gradually. Here are some suggestions that might help:   If you now eat 1 or 2 servings of vegetables a day, add a serving at lunch and  another at dinner.   If you have not been eating fruit regularly, or have only juice at breakfast, add a serving to your meals or have it as a snack.   Drink milk or water with lunch or dinner instead of soda, sugar-sweetened tea, or alcohol. Choose low-fat (1%) or fat-free (nonfat) dairy products so that you are eating fewer calories and less saturated fat, total fat, and cholesterol.   Read food labels on margarines and salad dressings to choose products lowest in fat.   If you now eat large portions of meat, slowly cut back--by a half or a third at each meal. Limit meat to 6 ounces a day (two 3-ounce servings). Three to 4 ounces is about the size of a deck of cards.   Have 2 or more meatless meals each week. Increase servings of vegetables, rice, pasta, and beans in all meals. Try casseroles, pasta, and stir-oviedo dishes, which have less meat and more vegetables, grains, and beans.   Use fruits canned in their own juice. Fresh fruits require little or no preparation. Dried fruits are a good choice to carry with you or to have ready in the car.   Try these snacks ideas: unsalted pretzels or nuts mixed with raisins, mile crackers, low-fat and fat-free yogurt or frozen yogurt, popcorn with no salt or butter added, and raw vegetables.   Choose whole-grain foods to get more nutrients, including minerals and fiber. For example, choose whole-wheat bread, whole-grain cereals, or brown rice.   Use fresh, frozen, or no-salt-added canned vegetables.   Remember to also reduce the salt and sodium in your diet. Try to have no more than 2000 milligrams (mg) of sodium per day, with a goal of further reducing the sodium to 1500 mg per day. Three important ways to reduce sodium are:   Eat food products with reduced-sodium or no salt added.   Use less salt when you prepare foods and do not add salt to your food at the table.   Read food labels. Aim for foods that contain less than 5% of the daily value of sodium.   The DASH eating  plan is not designed for weight loss. But it contains many lower-calorie foods, such as fruits and vegetables. You can make it lower in calories by replacing high-calorie foods with more fruits and vegetables. Some ideas to increase fruits and vegetables and decrease calories include:   Eat a medium apple instead of 4 shortbread cookies. You'll save 80 calories.   Eat 1/4 cup of dried apricots instead of a 2-ounce bag of pork rinds. You'll save 230 calories.   Have a hamburger that's 3 ounces instead of 6 ounces. Add a 1/2 cup serving of carrots and a 1/2 cup serving of spinach. You'll save more than 200 calories.   Instead of 5 ounces of chicken, have a stir oviedo with 2 ounces of chicken and 1 and 1/2 cups of raw vegetables. Use just a small amount of vegetable oil. You'll save 50 calories.   Have a 1/2 cup serving of low-fat frozen yogurt instead of a 1-and-1/2-ounce chocolate bar. You'll save about 110 calories.   Use low-fat or fat-free condiments, such as fat-free salad dressings.   Eat smaller portions. Cut back gradually.   Use food labels to compare fat and calorie content in packaged foods. Items marked low fat or fat free may be lower in fat but not lower in calories than their regular versions.   Limit foods with lots of added sugar, such as pies, flavored yogurts, candy bars, ice cream, sherbet, regular soft drinks, and fruit drinks.   Drink water or club soda instead of cola or other soda drinks.     For more information, see the National [employer], [employer] Web site at: http://www.nhlbi.nih.gov/health/public/heart/hbp/dash/.       Follow up 3-4 weeks recheck blood pressure, bring monitor with you

## 2018-06-20 NOTE — MR AVS SNAPSHOT
After Visit Summary   6/20/2018    Abhi Pabon    MRN: [id number]           Patient Information     Date Of Birth          1960        Visit Information        Provider Department      6/20/2018 2:20 PM Kristopher Cook MD [facility]        Today's Diagnoses     Hypertension goal BP (blood pressure) < 140/90    -  1    Reason for consultation- form completion          Care Instructions                                     Dietary Approaches to Stop Hypertension (The DASH Diet)   What is hypertension?   Hypertension is blood pressure that keeps being higher than normal. Blood pressure is the force of blood against artery walls as the heart pumps blood through the body. Blood pressure can be unhealthy if it is above 120/80. The higher your blood pressure, the greater the health risk.   High blood pressure can be controlled if you take these steps:   Maintain a healthy weight.   Are physically active.   Follow a healthy eating plan, which includes foods that do not have a lot of salt and sodium.   Do not drink a lot of alcohol.   Diet affects high blood pressure. Following the DASH diet and reducing the amount of sodium in your diet will help lower your blood pressure. It will also help prevent high blood pressure.   What is the DASH diet?   Dietary Approaches to Stop Hypertension (DASH) is a diet that is low in saturated fat, cholesterol, and total fat. It emphasizes fruits, vegetables, and low-fat dairy foods. The DASH diet also includes whole-grain products, fish, poultry, and nuts. It encourages fewer servings of red meat, sweets, and sugar-containing beverages. It is rich in magnesium, potassium, and calcium, as well as protein and fiber.   How do I get started on the DASH diet?   The DASH diet requires no special foods and has no hard-to-follow recipes. Start by seeing how DASH compares with your current eating habits.   The DASH eating plan illustrated below is  based on a diet of 2,000 calories a day. Your healthcare provider or a dietitian can help you determine how many calories a day you need. Most adults need somewhere between 1600 and 2800 calories a day. Serving sizes for different foods vary from 1/2 cup to 1 and 1/4 cups. Check product nutrition labels for serving sizes and the number of calories per serving.                      Number of        Examples of  Food Group      servings         serving size  ----------------------------------------------------------------    Grains and      7 to 8 per day   1 slice of bread  grain products                   1 cup ready-to-eat cold cereal                                   1/2 cup cooked rice, pasta,                                   or cereal    Vegetables      4 to 5 per day   1 cup raw leafy vegetable                                   1/2 cup cooked vegetable                                   6 ounces (oz) vegetable juice      Fruits          4 to 5 per day   1 medium fruit                                   1/4 cup dried fruit                                   1/2 cup fresh, frozen, or                                   canned fruit                                   6 oz fruit juice      Low-fat or      2 to 3 per day   8 oz milk  fat-free                         1 cup yogurt  dairy foods                      1 and 1/2 oz cheese    Lean meats,  poultry,        2 or fewer per   3 oz cooked lean meat,  or fish         day              skinless poultry, or fish    Nuts, seeds,    4 to 5 per week  1/3 cup or 1 and 1/2 oz nuts  and dry beans                    1 tablespoon or 1/2 oz seeds                                   1/2 cup cooked dry beans    Fats and oils   2 to 3 per day   1 teaspoon soft margarine                                   1 tablespoon low-fat mayonnaise                                   2 tablespoons light salad                                   dressing                                   1 teaspoon  vegetable oil    Sweets          5 per week       1 tablespoon sugar                                   1 tablespoon jelly or jam                                   1/2 oz jelly beans                                   8 oz lemonade  ----------------------------------------------------------------  Make changes gradually. Here are some suggestions that might help:   If you now eat 1 or 2 servings of vegetables a day, add a serving at lunch and another at dinner.   If you have not been eating fruit regularly, or have only juice at breakfast, add a serving to your meals or have it as a snack.   Drink milk or water with lunch or dinner instead of soda, sugar-sweetened tea, or alcohol. Choose low-fat (1%) or fat-free (nonfat) dairy products so that you are eating fewer calories and less saturated fat, total fat, and cholesterol.   Read food labels on margarines and salad dressings to choose products lowest in fat.   If you now eat large portions of meat, slowly cut back--by a half or a third at each meal. Limit meat to 6 ounces a day (two 3-ounce servings). Three to 4 ounces is about the size of a deck of cards.   Have 2 or more meatless meals each week. Increase servings of vegetables, rice, pasta, and beans in all meals. Try casseroles, pasta, and stir-oviedo dishes, which have less meat and more vegetables, grains, and beans.   Use fruits canned in their own juice. Fresh fruits require little or no preparation. Dried fruits are a good choice to carry with you or to have ready in the car.   Try these snacks ideas: unsalted pretzels or nuts mixed with raisins, mile crackers, low-fat and fat-free yogurt or frozen yogurt, popcorn with no salt or butter added, and raw vegetables.   Choose whole-grain foods to get more nutrients, including minerals and fiber. For example, choose whole-wheat bread, whole-grain cereals, or brown rice.   Use fresh, frozen, or no-salt-added canned vegetables.   Remember to also reduce the salt and  sodium in your diet. Try to have no more than 2000 milligrams (mg) of sodium per day, with a goal of further reducing the sodium to 1500 mg per day. Three important ways to reduce sodium are:   Eat food products with reduced-sodium or no salt added.   Use less salt when you prepare foods and do not add salt to your food at the table.   Read food labels. Aim for foods that contain less than 5% of the daily value of sodium.   The DASH eating plan is not designed for weight loss. But it contains many lower-calorie foods, such as fruits and vegetables. You can make it lower in calories by replacing high-calorie foods with more fruits and vegetables. Some ideas to increase fruits and vegetables and decrease calories include:   Eat a medium apple instead of 4 shortbread cookies. You'll save 80 calories.   Eat 1/4 cup of dried apricots instead of a 2-ounce bag of pork rinds. You'll save 230 calories.   Have a hamburger that's 3 ounces instead of 6 ounces. Add a 1/2 cup serving of carrots and a 1/2 cup serving of spinach. You'll save more than 200 calories.   Instead of 5 ounces of chicken, have a stir oviedo with 2 ounces of chicken and 1 and 1/2 cups of raw vegetables. Use just a small amount of vegetable oil. You'll save 50 calories.   Have a 1/2 cup serving of low-fat frozen yogurt instead of a 1-and-1/2-ounce chocolate bar. You'll save about 110 calories.   Use low-fat or fat-free condiments, such as fat-free salad dressings.   Eat smaller portions. Cut back gradually.   Use food labels to compare fat and calorie content in packaged foods. Items marked low fat or fat free may be lower in fat but not lower in calories than their regular versions.   Limit foods with lots of added sugar, such as pies, flavored yogurts, candy bars, ice cream, sherbet, regular soft drinks, and fruit drinks.   Drink water or club soda instead of cola or other soda drinks.     For more information, see the National Heart, Lung, and Blood  "[place] Web site at: http://www.nhlbi.nih.gov/health/public/heart/hbp/dash/.       Follow up 3-4 weeks recheck blood pressure, bring monitor with you          Follow-ups after your visit        Your next 10 appointments already scheduled     2018  2:20 PM CDT   [facility] with [facility] Cook [facility]    [address] [place] [id number] [phone]              Who to contact     If you have questions or need follow up information about today's clinic visit or your schedule please contact [facility] directly at [phone].  Normal or non-critical lab and imaging results will be communicated to you by MyChart, letter or phone within 4 business days after the clinic has received the results. If you do not hear from us within 7 days, please contact the clinic through [facility] or phone. If you have a critical or abnormal lab result, we will notify you by phone as soon as possible.  Submit refill requests through [facility] or call your pharmacy and they will forward the refill request to us. Please allow 3 business days for your refill to be completed.          Additional Information About Your Visit        [facility] Information     [facility] lets you send messages to your doctor, view your test results, renew your prescriptions, schedule appointments and more. To sign up, go to [email or web address] . Click on \"Log in\" on the left side of the screen, which will take you to the Welcome page. Then click on \"Sign up Now\" on the right side of the page.     You will be asked to enter the access code listed below, as well as some personal information. Please follow the directions to create your username and password.     Your access code is: [email or web address]  Expires: 2018  2:31 PM     Your access code will  in 90 days. If you need help or a new code, please call your [place] clinic or [phone].        Care " "EveryWhere ID     This is your Care EveryWhere ID. This could be used by other organizations to access your [place] medical records  [id number]        Your Vitals Were     Pulse Temperature Height BMI (Body Mass Index)          80 97.6  F (36.4  C) (Oral) 5' 10\" (1.778 m) 32.91 kg/m2         Blood Pressure from Last 3 Encounters:   06/20/18 (!) 152/102   06/05/18 138/86   05/21/18 164/79    Weight from Last 3 Encounters:   06/20/18 229 lb 6 oz (104 kg)   06/05/18 226 lb (102.5 kg)   05/21/18 230 lb (104.3 kg)              Today, you had the following     No orders found for display       Primary Care Provider Office Phone # Fax #    [facility] [phone]       [address] [facility] [address]        Equal Access to Services     TRACI PELAYO : Hadii zechariah fuchso Sogeorge, waaxda luqadaha, qaybta kaalmada adeegyada, ti monteiro . So [facility] [phone].    ATENCIÓN: Si habla español, tiene a perez disposición servicios gratuitos de asistencia lingüística. Llame al [phone].    We comply with applicable federal civil rights laws and [place] laws. We do not discriminate on the basis of race, color, national origin, age, disability, sex, sexual orientation, or gender identity.            Thank you!     Thank you for choosing [facility]  for your care. Our goal is always to provide you with excellent care. Hearing back from our patients is one way we can continue to improve our services. Please take a few minutes to complete the written survey that you may receive in the mail after your visit with us. Thank you!             Your Updated Medication List - Protect others around you: Learn how to safely use, store and throw away your medicines at www.disposemymeds.org.          This list is accurate as of 6/20/18  3:45 PM.  Always use your most recent med list.                   Brand Name Dispense Instructions for use " Diagnosis    amLODIPine 5 MG tablet    NORVASC    90 tablet    Take 1 tablet (5 mg) by mouth daily    Hypertension goal BP (blood pressure) < 140/90       lisinopril 20 MG tablet    PRINIVIL/ZESTRIL    180 tablet    Take 1 tablet (20 mg) by mouth 2 times daily    Hypertension goal BP (blood pressure) < 140/90       triamcinolone 0.1 % cream    KENALOG    453 g    Apply sparingly to affected area tid prn.    Irritant contact dermatitis, unspecified trigger

## 2018-06-20 NOTE — PROGRESS NOTES
SUBJECTIVE:   Abhi Pabon is a 58 year old male who presents to clinic today for the following health issues:      Patient is here today to have forms for FMLA ([employer]) completed. Noted on form that he has missed work due to markedly elevated blood pressure. I started seeing him on 5/21 for concerns of blood pressure and anticipate he won't need to miss work with better control of BP. He had two episodes where he was seen in the ER and hospitalized (Nov 17th and April 17th). Missed work due to difficulty controlling his BP.        PHQ-9 score of 3    AXEL-7 score of 1    Problem list and histories reviewed & adjusted, as indicated.  Additional history: as documented    Patient Active Problem List   Diagnosis     Hypertension goal BP (blood pressure) < 140/90     Irritant contact dermatitis, unspecified trigger     Eczema, unspecified type     Family history of colon cancer     Anxiety     History reviewed. No pertinent surgical history.    Social History   Substance Use Topics     Smoking status: Never Smoker     Smokeless tobacco: Never Used     Alcohol use Yes     Family History   Problem Relation Age of Onset     Hypertension Mother      Ovarian Cancer Mother      Diabetes Father      Hypertension Father      Cancer Father      Cervical Cancer Sister      Anxiety Disorder Sister      Cerebrovascular Disease Sister      HEART DISEASE Sister      Anxiety Disorder Sister          Current Outpatient Prescriptions   Medication Sig Dispense Refill     amLODIPine (NORVASC) 5 MG tablet Take 1 tablet (5 mg) by mouth daily 90 tablet 3     lisinopril (PRINIVIL/ZESTRIL) 20 MG tablet Take 1 tablet (20 mg) by mouth 2 times daily 180 tablet 3     triamcinolone (KENALOG) 0.1 % cream Apply sparingly to affected area tid prn. 453 g 0     Allergies   Allergen Reactions     Penicillins Unknown     Recent Labs   Lab Test  05/21/18   1546   CR  0.82   GFRESTIMATED  >90   GFRESTBLACK  >90   POTASSIUM  4.3      BP Readings  "from Last 3 Encounters:   06/20/18 (!) 152/102   06/05/18 138/86   05/21/18 164/79    Wt Readings from Last 3 Encounters:   06/20/18 104 kg (229 lb 6 oz)   06/05/18 102.5 kg (226 lb)   05/21/18 104.3 kg (230 lb)                  Labs reviewed in EPIC    Reviewed and updated as needed this visit by clinical staff       Reviewed and updated as needed this visit by Provider         ROS:  Constitutional, HEENT, cardiovascular, pulmonary, GI, , musculoskeletal, neuro, skin, endocrine and psych systems are negative, except as otherwise noted.    This document serves as a record of the services and decisions personally performed and made by Umesh Cook MD. It was created on their behalf by Vaughn Decker, a trained medical scribe. The creation of this document is based the provider's statements to the medical scribe.  Vaughn Decker June 20, 2018 3:06 PM       OBJECTIVE:     BP (!) 152/102 (BP Location: Right arm, Cuff Size: Adult Large)  Pulse 80  Temp 97.6  F (36.4  C) (Oral)  Ht 1.778 m (5' 10\")  Wt 104 kg (229 lb 6 oz)  BMI 32.91 kg/m2  Body mass index is 32.91 kg/(m^2).  GENERAL: overweight, alert and no distress  HENT: ear canals and TM's normal, nose and mouth without ulcers or lesions  RESP: lungs clear to auscultation - no rales, rhonchi or wheezes  CV: regular rate and rhythm, normal S1 S2, no S3 or S4, no murmur, click or rub  SKIN: no suspicious lesions, rashes have improved from  PSYCH: mentation appears normal, affect normal/bright, stress surrounding bed bugs significantly improved    Diagnostic Test Results:  none     ASSESSMENT/PLAN:   (I10) Hypertension goal BP (blood pressure) < 140/90  (primary encounter diagnosis)  Comment: not fully controlled  Plan: patient is going to work on diet, exercise, and weight loss - this has worked well in the past. Declined adjustment in medications. Anticipate we're going to be able to control BP and avoid missing work.        -follow up in 1 month for recheck of " BP    (Z71.9) Reason for consultation- form completion  Comment: form completed and faxed  Plan: follow up as needed    25 min spent with patient >50% in review and counseling and reviewing forms for completion      The information in this document, created by the medical scribe for me, accurately reflects the services I personally performed and the decisions made by me. I have reviewed and approved this document for accuracy prior to leaving the patient care area.    Kristopher Cook MD, MD  [facility]

## 2018-06-21 ASSESSMENT — ANXIETY QUESTIONNAIRES: GAD7 TOTAL SCORE: 1

## 2018-06-21 ASSESSMENT — PATIENT HEALTH QUESTIONNAIRE - PHQ9: SUM OF ALL RESPONSES TO PHQ QUESTIONS 1-9: 3

## 2018-06-22 ENCOUNTER — TELEPHONE (OUTPATIENT)
Dept: FAMILY MEDICINE | Facility: CLINIC | Age: 58
End: 2018-06-22

## 2018-06-22 NOTE — TELEPHONE ENCOUNTER
Reason for Call:  Other     Detailed comments: Monika requesting pcp team to call and clarify form faxed to [place] on 6/20/18 for patient. Please advise.     Phone Number Patient can be reached at: Other phone number:  [phone] ext [phone]    Best Time: 9am-5pm (Mon-Fri)    Can we leave a detailed message on this number? YES    Call taken on 6/22/2018 at 3:49 PM by Jennifer Chamberlain

## 2018-07-23 ENCOUNTER — OFFICE VISIT (OUTPATIENT)
Dept: FAMILY MEDICINE | Facility: CLINIC | Age: 58
End: 2018-07-23
Payer: COMMERCIAL

## 2018-07-23 VITALS
BODY MASS INDEX: 33.86 KG/M2 | WEIGHT: 236 LBS | DIASTOLIC BLOOD PRESSURE: 95 MMHG | HEART RATE: 82 BPM | TEMPERATURE: 98 F | SYSTOLIC BLOOD PRESSURE: 153 MMHG

## 2018-07-23 DIAGNOSIS — I10 HYPERTENSION GOAL BP (BLOOD PRESSURE) < 140/90: Primary | ICD-10-CM

## 2018-07-23 DIAGNOSIS — E66.09 CLASS 1 OBESITY DUE TO EXCESS CALORIES WITHOUT SERIOUS COMORBIDITY WITH BODY MASS INDEX (BMI) OF 33.0 TO 33.9 IN ADULT: ICD-10-CM

## 2018-07-23 DIAGNOSIS — E66.811 CLASS 1 OBESITY DUE TO EXCESS CALORIES WITHOUT SERIOUS COMORBIDITY WITH BODY MASS INDEX (BMI) OF 33.0 TO 33.9 IN ADULT: ICD-10-CM

## 2018-07-23 PROCEDURE — 99214 OFFICE O/P EST MOD 30 MIN: CPT | Performed by: FAMILY MEDICINE

## 2018-07-23 NOTE — PROGRESS NOTES
SUBJECTIVE:   Abhi Pabon is a 58 year old male who presents to clinic today for the following health issues:        Hypertension Follow-up      Outpatient blood pressures are being checked at home.    Low Salt Diet: no added salt      Amount of exercise or physical activity: 4-5 days/week for an average of 15-30 minutes    Problems taking medications regularly: No    Medication side effects: none    Diet: low salt and low fat/cholesterol    Home blood pressures have been 120/80    He has not been eating well the past week. Increased salt intake. Weight has increased 10 pounds    Problem list and histories reviewed & adjusted, as indicated.  Additional history: as documented    BP Readings from Last 3 Encounters:   07/23/18 (!) 153/95   06/20/18 142/89   06/05/18 138/86    Wt Readings from Last 3 Encounters:   07/23/18 236 lb (107 kg)   06/20/18 229 lb 6 oz (104 kg)   06/05/18 226 lb (102.5 kg)                    Reviewed and updated as needed this visit by clinical staff  Tobacco  Allergies  Meds  Med Hx  Surg Hx  Fam Hx  Soc Hx      Reviewed and updated as needed this visit by Provider         ROS:  CONSTITUTIONAL:POSITIVE  for weight gain  ENT/MOUTH: NEGATIVE for ear, mouth and throat problems  RESP: NEGATIVE for significant cough or SOB  CV: NEGATIVE for chest pain, palpitations or peripheral edema  MUSCULOSKELETAL: NEGATIVE for significant arthralgias or myalgia  PSYCHIATRIC: NEGATIVE for changes in mood or affect    OBJECTIVE:     BP (!) 153/95  Pulse 82  Temp 98  F (36.7  C) (Oral)  Wt 236 lb (107 kg)  BMI 33.86 kg/m2  Body mass index is 33.86 kg/(m^2).   GENERAL: healthy, alert and no distress  NECK: no adenopathy, no asymmetry, masses, or scars and thyroid normal to palpation  RESP: lungs clear to auscultation - no rales, rhonchi or wheezes  CV: regular rate and rhythm, normal S1 S2, no S3 or S4, no murmur, click or rub, no peripheral edema and peripheral pulses strong  ABDOMEN: soft,  nontender, no hepatosplenomegaly, no masses and bowel sounds normal  MS: no gross musculoskeletal defects noted, no edema    Diagnostic Test Results:  none     ASSESSMENT:       PLAN:   (I10) Hypertension goal BP (blood pressure) < 140/90  (primary encounter diagnosis)  Comment: borderline control. Home blood pressures have been in good congrol  Plan:  Continue present medications he can take amlodipine 2.5 BID. He stated 10 mg makes him tired    (E66.09,  Z68.33) Class 1 obesity due to excess calories without serious comorbidity with body mass index (BMI) of 33.0 to 33.9 in adult  Comment: reviewed weight loss, diet, low salt, exercise  Plan: he will work on diet and exercise        Patient Instructions   Follow up for physical    Work on controlling diet                                     Dietary Approaches to Stop Hypertension (The DASH Diet)   What is hypertension?   Hypertension is blood pressure that keeps being higher than normal. Blood pressure is the force of blood against artery walls as the heart pumps blood through the body. Blood pressure can be unhealthy if it is above 120/80. The higher your blood pressure, the greater the health risk.   High blood pressure can be controlled if you take these steps:   Maintain a healthy weight.   Are physically active.   Follow a healthy eating plan, which includes foods that do not have a lot of salt and sodium.   Do not drink a lot of alcohol.   Diet affects high blood pressure. Following the DASH diet and reducing the amount of sodium in your diet will help lower your blood pressure. It will also help prevent high blood pressure.   What is the DASH diet?   Dietary Approaches to Stop Hypertension (DASH) is a diet that is low in saturated fat, cholesterol, and total fat. It emphasizes fruits, vegetables, and low-fat dairy foods. The DASH diet also includes whole-grain products, fish, poultry, and nuts. It encourages fewer servings of red meat, sweets, and  sugar-containing beverages. It is rich in magnesium, potassium, and calcium, as well as protein and fiber.   How do I get started on the DASH diet?   The DASH diet requires no special foods and has no hard-to-follow recipes. Start by seeing how DASH compares with your current eating habits.   The DASH eating plan illustrated below is based on a diet of 2,000 calories a day. Your healthcare provider or a dietitian can help you determine how many calories a day you need. Most adults need somewhere between 1600 and 2800 calories a day. Serving sizes for different foods vary from 1/2 cup to 1 and 1/4 cups. Check product nutrition labels for serving sizes and the number of calories per serving.                      Number of        Examples of  Food Group      servings         serving size  ----------------------------------------------------------------    Grains and      7 to 8 per day   1 slice of bread  grain products                   1 cup ready-to-eat cold cereal                                   1/2 cup cooked rice, pasta,                                   or cereal    Vegetables      4 to 5 per day   1 cup raw leafy vegetable                                   1/2 cup cooked vegetable                                   6 ounces (oz) vegetable juice      Fruits          4 to 5 per day   1 medium fruit                                   1/4 cup dried fruit                                   1/2 cup fresh, frozen, or                                   canned fruit                                   6 oz fruit juice      Low-fat or      2 to 3 per day   8 oz milk  fat-free                         1 cup yogurt  dairy foods                      1 and 1/2 oz cheese    Lean meats,  poultry,        2 or fewer per   3 oz cooked lean meat,  or fish         day              skinless poultry, or fish    Nuts, seeds,    4 to 5 per week  1/3 cup or 1 and 1/2 oz nuts  and dry beans                    1 tablespoon or 1/2 oz seeds                                    1/2 cup cooked dry beans    Fats and oils   2 to 3 per day   1 teaspoon soft margarine                                   1 tablespoon low-fat mayonnaise                                   2 tablespoons light salad                                   dressing                                   1 teaspoon vegetable oil    Sweets          5 per week       1 tablespoon sugar                                   1 tablespoon jelly or jam                                   1/2 oz jelly beans                                   8 oz lemonade  ----------------------------------------------------------------  Make changes gradually. Here are some suggestions that might help:   If you now eat 1 or 2 servings of vegetables a day, add a serving at lunch and another at dinner.   If you have not been eating fruit regularly, or have only juice at breakfast, add a serving to your meals or have it as a snack.   Drink milk or water with lunch or dinner instead of soda, sugar-sweetened tea, or alcohol. Choose low-fat (1%) or fat-free (nonfat) dairy products so that you are eating fewer calories and less saturated fat, total fat, and cholesterol.   Read food labels on margarines and salad dressings to choose products lowest in fat.   If you now eat large portions of meat, slowly cut back--by a half or a third at each meal. Limit meat to 6 ounces a day (two 3-ounce servings). Three to 4 ounces is about the size of a deck of cards.   Have 2 or more meatless meals each week. Increase servings of vegetables, rice, pasta, and beans in all meals. Try casseroles, pasta, and stir-oviedo dishes, which have less meat and more vegetables, grains, and beans.   Use fruits canned in their own juice. Fresh fruits require little or no preparation. Dried fruits are a good choice to carry with you or to have ready in the car.   Try these snacks ideas: unsalted pretzels or nuts mixed with raisins, mile crackers, low-fat and fat-free  yogurt or frozen yogurt, popcorn with no salt or butter added, and raw vegetables.   Choose whole-grain foods to get more nutrients, including minerals and fiber. For example, choose whole-wheat bread, whole-grain cereals, or brown rice.   Use fresh, frozen, or no-salt-added canned vegetables.   Remember to also reduce the salt and sodium in your diet. Try to have no more than 2000 milligrams (mg) of sodium per day, with a goal of further reducing the sodium to 1500 mg per day. Three important ways to reduce sodium are:   Eat food products with reduced-sodium or no salt added.   Use less salt when you prepare foods and do not add salt to your food at the table.   Read food labels. Aim for foods that contain less than 5% of the daily value of sodium.   The DASH eating plan is not designed for weight loss. But it contains many lower-calorie foods, such as fruits and vegetables. You can make it lower in calories by replacing high-calorie foods with more fruits and vegetables. Some ideas to increase fruits and vegetables and decrease calories include:   Eat a medium apple instead of 4 shortbread cookies. You'll save 80 calories.   Eat 1/4 cup of dried apricots instead of a 2-ounce bag of pork rinds. You'll save 230 calories.   Have a hamburger that's 3 ounces instead of 6 ounces. Add a 1/2 cup serving of carrots and a 1/2 cup serving of spinach. You'll save more than 200 calories.   Instead of 5 ounces of chicken, have a stir oviedo with 2 ounces of chicken and 1 and 1/2 cups of raw vegetables. Use just a small amount of vegetable oil. You'll save 50 calories.   Have a 1/2 cup serving of low-fat frozen yogurt instead of a 1-and-1/2-ounce chocolate bar. You'll save about 110 calories.   Use low-fat or fat-free condiments, such as fat-free salad dressings.   Eat smaller portions. Cut back gradually.   Use food labels to compare fat and calorie content in packaged foods. Items marked low fat or fat free may be lower in fat  but not lower in calories than their regular versions.   Limit foods with lots of added sugar, such as pies, flavored yogurts, candy bars, ice cream, sherbet, regular soft drinks, and fruit drinks.   Drink water or club soda instead of cola or other soda drinks.     For more information, see the National [facility] [place] Web site at: http://www.nhlbi.nih.gov/health/public/heart/hbp/dash/.         Kristopher Cook MD, MD  [facility]

## 2018-07-23 NOTE — MR AVS SNAPSHOT
After Visit Summary   7/23/2018    Abhi Pabon    MRN: [id number]           Patient Information     Date Of Birth          1960        Visit Information        Provider Department      7/23/2018 2:20 PM Kristopher Cook MD [facility]        Today's Diagnoses     Screen for colon cancer        Need for hepatitis C screening test        Screening for HIV (human immunodeficiency virus)        Need for prophylactic vaccination with tetanus-diphtheria (TD)          Care Instructions    Follow up for physical    Work on controlling diet                                     Dietary Approaches to Stop Hypertension (The DASH Diet)   What is hypertension?   Hypertension is blood pressure that keeps being higher than normal. Blood pressure is the force of blood against artery walls as the heart pumps blood through the body. Blood pressure can be unhealthy if it is above 120/80. The higher your blood pressure, the greater the health risk.   High blood pressure can be controlled if you take these steps:   Maintain a healthy weight.   Are physically active.   Follow a healthy eating plan, which includes foods that do not have a lot of salt and sodium.   Do not drink a lot of alcohol.   Diet affects high blood pressure. Following the DASH diet and reducing the amount of sodium in your diet will help lower your blood pressure. It will also help prevent high blood pressure.   What is the DASH diet?   Dietary Approaches to Stop Hypertension (DASH) is a diet that is low in saturated fat, cholesterol, and total fat. It emphasizes fruits, vegetables, and low-fat dairy foods. The DASH diet also includes whole-grain products, fish, poultry, and nuts. It encourages fewer servings of red meat, sweets, and sugar-containing beverages. It is rich in magnesium, potassium, and calcium, as well as protein and fiber.   How do I get started on the DASH diet?   The DASH diet requires no special  foods and has no hard-to-follow recipes. Start by seeing how DASH compares with your current eating habits.   The DASH eating plan illustrated below is based on a diet of 2,000 calories a day. Your healthcare provider or a dietitian can help you determine how many calories a day you need. Most adults need somewhere between 1600 and 2800 calories a day. Serving sizes for different foods vary from 1/2 cup to 1 and 1/4 cups. Check product nutrition labels for serving sizes and the number of calories per serving.                      Number of        Examples of  Food Group      servings         serving size  ----------------------------------------------------------------    Grains and      7 to 8 per day   1 slice of bread  grain products                   1 cup ready-to-eat cold cereal                                   1/2 cup cooked rice, pasta,                                   or cereal    Vegetables      4 to 5 per day   1 cup raw leafy vegetable                                   1/2 cup cooked vegetable                                   6 ounces (oz) vegetable juice      Fruits          4 to 5 per day   1 medium fruit                                   1/4 cup dried fruit                                   1/2 cup fresh, frozen, or                                   canned fruit                                   6 oz fruit juice      Low-fat or      2 to 3 per day   8 oz milk  fat-free                         1 cup yogurt  dairy foods                      1 and 1/2 oz cheese    Lean meats,  poultry,        2 or fewer per   3 oz cooked lean meat,  or fish         day              skinless poultry, or fish    Nuts, seeds,    4 to 5 per week  1/3 cup or 1 and 1/2 oz nuts  and dry beans                    1 tablespoon or 1/2 oz seeds                                   1/2 cup cooked dry beans    Fats and oils   2 to 3 per day   1 teaspoon soft margarine                                   1 tablespoon low-fat mayonnaise                                    2 tablespoons light salad                                   dressing                                   1 teaspoon vegetable oil    Sweets          5 per week       1 tablespoon sugar                                   1 tablespoon jelly or jam                                   1/2 oz jelly beans                                   8 oz lemonade  ----------------------------------------------------------------  Make changes gradually. Here are some suggestions that might help:   If you now eat 1 or 2 servings of vegetables a day, add a serving at lunch and another at dinner.   If you have not been eating fruit regularly, or have only juice at breakfast, add a serving to your meals or have it as a snack.   Drink milk or water with lunch or dinner instead of soda, sugar-sweetened tea, or alcohol. Choose low-fat (1%) or fat-free (nonfat) dairy products so that you are eating fewer calories and less saturated fat, total fat, and cholesterol.   Read food labels on margarines and salad dressings to choose products lowest in fat.   If you now eat large portions of meat, slowly cut back--by a half or a third at each meal. Limit meat to 6 ounces a day (two 3-ounce servings). Three to 4 ounces is about the size of a deck of cards.   Have 2 or more meatless meals each week. Increase servings of vegetables, rice, pasta, and beans in all meals. Try casseroles, pasta, and stir-oviedo dishes, which have less meat and more vegetables, grains, and beans.   Use fruits canned in their own juice. Fresh fruits require little or no preparation. Dried fruits are a good choice to carry with you or to have ready in the car.   Try these snacks ideas: unsalted pretzels or nuts mixed with raisins, mile crackers, low-fat and fat-free yogurt or frozen yogurt, popcorn with no salt or butter added, and raw vegetables.   Choose whole-grain foods to get more nutrients, including minerals and fiber. For example, choose  whole-wheat bread, whole-grain cereals, or brown rice.   Use fresh, frozen, or no-salt-added canned vegetables.   Remember to also reduce the salt and sodium in your diet. Try to have no more than 2000 milligrams (mg) of sodium per day, with a goal of further reducing the sodium to 1500 mg per day. Three important ways to reduce sodium are:   Eat food products with reduced-sodium or no salt added.   Use less salt when you prepare foods and do not add salt to your food at the table.   Read food labels. Aim for foods that contain less than 5% of the daily value of sodium.   The DASH eating plan is not designed for weight loss. But it contains many lower-calorie foods, such as fruits and vegetables. You can make it lower in calories by replacing high-calorie foods with more fruits and vegetables. Some ideas to increase fruits and vegetables and decrease calories include:   Eat a medium apple instead of 4 shortbread cookies. You'll save 80 calories.   Eat 1/4 cup of dried apricots instead of a 2-ounce bag of pork rinds. You'll save 230 calories.   Have a hamburger that's 3 ounces instead of 6 ounces. Add a 1/2 cup serving of carrots and a 1/2 cup serving of spinach. You'll save more than 200 calories.   Instead of 5 ounces of chicken, have a stir oviedo with 2 ounces of chicken and 1 and 1/2 cups of raw vegetables. Use just a small amount of vegetable oil. You'll save 50 calories.   Have a 1/2 cup serving of low-fat frozen yogurt instead of a 1-and-1/2-ounce chocolate bar. You'll save about 110 calories.   Use low-fat or fat-free condiments, such as fat-free salad dressings.   Eat smaller portions. Cut back gradually.   Use food labels to compare fat and calorie content in packaged foods. Items marked low fat or fat free may be lower in fat but not lower in calories than their regular versions.   Limit foods with lots of added sugar, such as pies, flavored yogurts, candy bars, ice cream, sherbet, regular soft drinks, and  fruit drinks.   Drink water or club soda instead of cola or other soda drinks.     For more information, see the National Heart, Lung, and Blood [place] Web site at: http://www.nhlbi.nih.gov/health/public/heart/hbp/dash/.             Follow-ups after your visit        Your next 10 appointments already scheduled     Aug 08, 2018  2:40 PM CDT   PHYSICAL with Kristopher Cook MD   [facility]    [address] [place] [id number] [phone]              Who to contact     If you have questions or need follow up information about today's clinic visit or your schedule please contact [facility] directly at [phone].  Normal or non-critical lab and imaging results will be communicated to you by MyChart, letter or phone within 4 business days after the clinic has received the results. If you do not hear from us within 7 days, please contact the clinic through MyChart or phone. If you have a critical or abnormal lab result, we will notify you by phone as soon as possible.  Submit refill requests through [employer] or call your pharmacy and they will forward the refill request to us. Please allow 3 business days for your refill to be completed.          Additional Information About Your Visit        Care EveryWhere ID     This is your Care EveryWhere ID. This could be used by other organizations to access your [place] medical records  [id number]        Your Vitals Were     Pulse Temperature BMI (Body Mass Index)             82 98  F (36.7  C) (Oral) 33.86 kg/m2          Blood Pressure from Last 3 Encounters:   07/23/18 (!) 153/95   06/20/18 142/89   06/05/18 138/86    Weight from Last 3 Encounters:   07/23/18 236 lb (107 kg)   06/20/18 229 lb 6 oz (104 kg)   06/05/18 226 lb (102.5 kg)              Today, you had the following     No orders found for display       Primary Care Provider Office Phone # Fax #    [place] [facility] [phone]       [address] [facility] [address]        Equal Access to Services     TRACI PELAYO : Hadii aad ku hadjuju Betancur, watheodorada luqclaus, qachiquitata kaalmada paty, ti garciazane josselyn So [facility] [phone].    ATENCIÓN: Si habla español, tiene a perez disposición servicios gratuitos de asistencia lingüística. Llame al [phone].    We comply with applicable federal civil rights laws and [place] laws. We do not discriminate on the basis of race, color, national origin, age, disability, sex, sexual orientation, or gender identity.            Thank you!     Thank you for choosing [facility]  for your care. Our goal is always to provide you with excellent care. Hearing back from our patients is one way we can continue to improve our services. Please take a few minutes to complete the written survey that you may receive in the mail after your visit with us. Thank you!             Your Updated Medication List - Protect others around you: Learn how to safely use, store and throw away your medicines at www.disposemymeds.org.          This list is accurate as of 7/23/18  3:43 PM.  Always use your most recent med list.                   Brand Name Dispense Instructions for use Diagnosis    amLODIPine 5 MG tablet    NORVASC    90 tablet    Take 1 tablet (5 mg) by mouth daily    Hypertension goal BP (blood pressure) < 140/90       lisinopril 20 MG tablet    PRINIVIL/ZESTRIL    180 tablet    Take 1 tablet (20 mg) by mouth 2 times daily    Hypertension goal BP (blood pressure) < 140/90       triamcinolone 0.1 % cream    KENALOG    453 g    Apply sparingly to affected area tid prn.    Irritant contact dermatitis, unspecified trigger

## 2018-07-23 NOTE — PATIENT INSTRUCTIONS
Follow up for physical    Work on controlling diet                                     Dietary Approaches to Stop Hypertension (The DASH Diet)   What is hypertension?   Hypertension is blood pressure that keeps being higher than normal. Blood pressure is the force of blood against artery walls as the heart pumps blood through the body. Blood pressure can be unhealthy if it is above 120/80. The higher your blood pressure, the greater the health risk.   High blood pressure can be controlled if you take these steps:   Maintain a healthy weight.   Are physically active.   Follow a healthy eating plan, which includes foods that do not have a lot of salt and sodium.   Do not drink a lot of alcohol.   Diet affects high blood pressure. Following the DASH diet and reducing the amount of sodium in your diet will help lower your blood pressure. It will also help prevent high blood pressure.   What is the DASH diet?   Dietary Approaches to Stop Hypertension (DASH) is a diet that is low in saturated fat, cholesterol, and total fat. It emphasizes fruits, vegetables, and low-fat dairy foods. The DASH diet also includes whole-grain products, fish, poultry, and nuts. It encourages fewer servings of red meat, sweets, and sugar-containing beverages. It is rich in magnesium, potassium, and calcium, as well as protein and fiber.   How do I get started on the DASH diet?   The DASH diet requires no special foods and has no hard-to-follow recipes. Start by seeing how DASH compares with your current eating habits.   The DASH eating plan illustrated below is based on a diet of 2,000 calories a day. Your healthcare provider or a dietitian can help you determine how many calories a day you need. Most adults need somewhere between 1600 and 2800 calories a day. Serving sizes for different foods vary from 1/2 cup to 1 and 1/4 cups. Check product nutrition labels for serving sizes and the number of calories per serving.                      Number  of        Examples of  Food Group      servings         serving size  ----------------------------------------------------------------    Grains and      7 to 8 per day   1 slice of bread  grain products                   1 cup ready-to-eat cold cereal                                   1/2 cup cooked rice, pasta,                                   or cereal    Vegetables      4 to 5 per day   1 cup raw leafy vegetable                                   1/2 cup cooked vegetable                                   6 ounces (oz) vegetable juice      Fruits          4 to 5 per day   1 medium fruit                                   1/4 cup dried fruit                                   1/2 cup fresh, frozen, or                                   canned fruit                                   6 oz fruit juice      Low-fat or      2 to 3 per day   8 oz milk  fat-free                         1 cup yogurt  dairy foods                      1 and 1/2 oz cheese    Lean meats,  poultry,        2 or fewer per   3 oz cooked lean meat,  or fish         day              skinless poultry, or fish    Nuts, seeds,    4 to 5 per week  1/3 cup or 1 and 1/2 oz nuts  and dry beans                    1 tablespoon or 1/2 oz seeds                                   1/2 cup cooked dry beans    Fats and oils   2 to 3 per day   1 teaspoon soft margarine                                   1 tablespoon low-fat mayonnaise                                   2 tablespoons light salad                                   dressing                                   1 teaspoon vegetable oil    Sweets          5 per week       1 tablespoon sugar                                   1 tablespoon jelly or jam                                   1/2 oz jelly beans                                   8 oz lemonade  ----------------------------------------------------------------  Make changes gradually. Here are some suggestions that might help:   If you now eat 1 or 2 servings  of vegetables a day, add a serving at lunch and another at dinner.   If you have not been eating fruit regularly, or have only juice at breakfast, add a serving to your meals or have it as a snack.   Drink milk or water with lunch or dinner instead of soda, sugar-sweetened tea, or alcohol. Choose low-fat (1%) or fat-free (nonfat) dairy products so that you are eating fewer calories and less saturated fat, total fat, and cholesterol.   Read food labels on margarines and salad dressings to choose products lowest in fat.   If you now eat large portions of meat, slowly cut back--by a half or a third at each meal. Limit meat to 6 ounces a day (two 3-ounce servings). Three to 4 ounces is about the size of a deck of cards.   Have 2 or more meatless meals each week. Increase servings of vegetables, rice, pasta, and beans in all meals. Try casseroles, pasta, and stir-oviedo dishes, which have less meat and more vegetables, grains, and beans.   Use fruits canned in their own juice. Fresh fruits require little or no preparation. Dried fruits are a good choice to carry with you or to have ready in the car.   Try these snacks ideas: unsalted pretzels or nuts mixed with raisins, mile crackers, low-fat and fat-free yogurt or frozen yogurt, popcorn with no salt or butter added, and raw vegetables.   Choose whole-grain foods to get more nutrients, including minerals and fiber. For example, choose whole-wheat bread, whole-grain cereals, or brown rice.   Use fresh, frozen, or no-salt-added canned vegetables.   Remember to also reduce the salt and sodium in your diet. Try to have no more than 2000 milligrams (mg) of sodium per day, with a goal of further reducing the sodium to 1500 mg per day. Three important ways to reduce sodium are:   Eat food products with reduced-sodium or no salt added.   Use less salt when you prepare foods and do not add salt to your food at the table.   Read food labels. Aim for foods that contain less than 5%  of the daily value of sodium.   The DASH eating plan is not designed for weight loss. But it contains many lower-calorie foods, such as fruits and vegetables. You can make it lower in calories by replacing high-calorie foods with more fruits and vegetables. Some ideas to increase fruits and vegetables and decrease calories include:   Eat a medium apple instead of 4 shortbread cookies. You'll save 80 calories.   Eat 1/4 cup of dried apricots instead of a 2-ounce bag of pork rinds. You'll save 230 calories.   Have a hamburger that's 3 ounces instead of 6 ounces. Add a 1/2 cup serving of carrots and a 1/2 cup serving of spinach. You'll save more than 200 calories.   Instead of 5 ounces of chicken, have a stir oviedo with 2 ounces of chicken and 1 and 1/2 cups of raw vegetables. Use just a small amount of vegetable oil. You'll save 50 calories.   Have a 1/2 cup serving of low-fat frozen yogurt instead of a 1-and-1/2-ounce chocolate bar. You'll save about 110 calories.   Use low-fat or fat-free condiments, such as fat-free salad dressings.   Eat smaller portions. Cut back gradually.   Use food labels to compare fat and calorie content in packaged foods. Items marked low fat or fat free may be lower in fat but not lower in calories than their regular versions.   Limit foods with lots of added sugar, such as pies, flavored yogurts, candy bars, ice cream, sherbet, regular soft drinks, and fruit drinks.   Drink water or club soda instead of cola or other soda drinks.     For more information, see the National Heart, Lung, and Blood [place] Web site at: http://www.nhlbi.nih.gov/health/public/heart/hbp/dash/.

## 2018-08-20 ENCOUNTER — TELEPHONE (OUTPATIENT)
Dept: FAMILY MEDICINE | Facility: CLINIC | Age: 58
End: 2018-08-20

## 2018-08-20 NOTE — TELEPHONE ENCOUNTER
Called and spoke with Samira. She said that she knows that patient has a physical coming up and she wanted Dr. Cook to be aware of/discuss some issues with patient. She said he patient is still having significant issues with anxiety and OCD. Jesús was convinced he was being bitten by bed bugs, 5-6 professional inspections with no trace of bed bugs. Still is convinced that he has to laundry every day because he's concerned about bed bugs. She would like him to get on anti-anxiety meds because he doesn't seem to be able to manage it on his own, or maybe even some therapy.     She says that he's also very anxious about unpaid medical bills. He recently went to the ER at [facility] and has probably 2000 dollars in bills. He keeps getting the bills in the mail but doesn't want to open them or face it. She states that he has the money, and that's not the issue. She wonders if he would be able to pay it when he comes for his appointment. I told her that he could address this when he checks in at the . I also gave her the business office number.     Route to PCP as HALI.    Jovi Hines RN

## 2018-08-20 NOTE — TELEPHONE ENCOUNTER
Reason for Call:  Other     Detailed comments: Samira the patients sister called wanting to talk to Dr. Cook or the nurse about her brothers anxieties and OCD.. She wants to know if there's anything they can do to help him    Phone Number Patient can be reached at: Other phone number: [phone]    Best Time: anytime    Can we leave a detailed message on this number? YES    Call taken on 8/20/2018 at 1:41 PM by Malu Lua

## 2018-08-23 ENCOUNTER — OFFICE VISIT (OUTPATIENT)
Dept: FAMILY MEDICINE | Facility: CLINIC | Age: 58
End: 2018-08-23
Payer: COMMERCIAL

## 2018-08-23 VITALS
HEART RATE: 88 BPM | BODY MASS INDEX: 33.36 KG/M2 | HEIGHT: 70 IN | TEMPERATURE: 98 F | SYSTOLIC BLOOD PRESSURE: 142 MMHG | DIASTOLIC BLOOD PRESSURE: 84 MMHG | WEIGHT: 233 LBS

## 2018-08-23 DIAGNOSIS — E66.09 CLASS 1 OBESITY DUE TO EXCESS CALORIES WITHOUT SERIOUS COMORBIDITY WITH BODY MASS INDEX (BMI) OF 33.0 TO 33.9 IN ADULT: ICD-10-CM

## 2018-08-23 DIAGNOSIS — Z23 NEED FOR PROPHYLACTIC VACCINATION WITH TETANUS-DIPHTHERIA (TD): ICD-10-CM

## 2018-08-23 DIAGNOSIS — I10 HYPERTENSION GOAL BP (BLOOD PRESSURE) < 140/90: ICD-10-CM

## 2018-08-23 DIAGNOSIS — Z11.4 SCREENING FOR HIV (HUMAN IMMUNODEFICIENCY VIRUS): ICD-10-CM

## 2018-08-23 DIAGNOSIS — R73.09 OTHER ABNORMAL GLUCOSE: ICD-10-CM

## 2018-08-23 DIAGNOSIS — Z11.1 SCREENING EXAMINATION FOR PULMONARY TUBERCULOSIS: ICD-10-CM

## 2018-08-23 DIAGNOSIS — E66.811 CLASS 1 OBESITY DUE TO EXCESS CALORIES WITHOUT SERIOUS COMORBIDITY WITH BODY MASS INDEX (BMI) OF 33.0 TO 33.9 IN ADULT: ICD-10-CM

## 2018-08-23 DIAGNOSIS — Z00.00 ROUTINE HISTORY AND PHYSICAL EXAMINATION OF ADULT: Primary | ICD-10-CM

## 2018-08-23 DIAGNOSIS — Z23 NEED FOR SHINGLES VACCINE: ICD-10-CM

## 2018-08-23 DIAGNOSIS — Z11.59 NEED FOR HEPATITIS C SCREENING TEST: ICD-10-CM

## 2018-08-23 DIAGNOSIS — Z12.11 SCREEN FOR COLON CANCER: ICD-10-CM

## 2018-08-23 DIAGNOSIS — Z12.5 SPECIAL SCREENING FOR MALIGNANT NEOPLASM OF PROSTATE: ICD-10-CM

## 2018-08-23 LAB — HBA1C MFR BLD: 5.8 % (ref 0–5.6)

## 2018-08-23 PROCEDURE — 80061 LIPID PANEL: CPT | Performed by: FAMILY MEDICINE

## 2018-08-23 PROCEDURE — 83036 HEMOGLOBIN GLYCOSYLATED A1C: CPT | Performed by: FAMILY MEDICINE

## 2018-08-23 PROCEDURE — 90715 TDAP VACCINE 7 YRS/> IM: CPT | Performed by: FAMILY MEDICINE

## 2018-08-23 PROCEDURE — 99396 PREV VISIT EST AGE 40-64: CPT | Mod: 25 | Performed by: FAMILY MEDICINE

## 2018-08-23 PROCEDURE — 36415 COLL VENOUS BLD VENIPUNCTURE: CPT | Performed by: FAMILY MEDICINE

## 2018-08-23 PROCEDURE — 86480 TB TEST CELL IMMUN MEASURE: CPT | Performed by: FAMILY MEDICINE

## 2018-08-23 PROCEDURE — G0103 PSA SCREENING: HCPCS | Performed by: FAMILY MEDICINE

## 2018-08-23 PROCEDURE — 86803 HEPATITIS C AB TEST: CPT | Performed by: FAMILY MEDICINE

## 2018-08-23 PROCEDURE — 87389 HIV-1 AG W/HIV-1&-2 AB AG IA: CPT | Performed by: FAMILY MEDICINE

## 2018-08-23 PROCEDURE — 90750 HZV VACC RECOMBINANT IM: CPT | Performed by: FAMILY MEDICINE

## 2018-08-23 ASSESSMENT — ENCOUNTER SYMPTOMS
NAUSEA: 0
FREQUENCY: 0
CONSTIPATION: 0
COUGH: 0
HEADACHES: 0
FEVER: 0
ABDOMINAL PAIN: 0
ARTHRALGIAS: 0
SORE THROAT: 0
JOINT SWELLING: 0
PALPITATIONS: 0
DIARRHEA: 0
CHILLS: 0
NERVOUS/ANXIOUS: 0
EYE PAIN: 0
HEMATURIA: 0
SHORTNESS OF BREATH: 0
HEARTBURN: 0
PARESTHESIAS: 0
MYALGIAS: 0
HEMATOCHEZIA: 0
DYSURIA: 0
DIZZINESS: 0

## 2018-08-23 ASSESSMENT — ANXIETY QUESTIONNAIRES
5. BEING SO RESTLESS THAT IT IS HARD TO SIT STILL: NOT AT ALL
3. WORRYING TOO MUCH ABOUT DIFFERENT THINGS: NOT AT ALL
GAD7 TOTAL SCORE: 1
7. FEELING AFRAID AS IF SOMETHING AWFUL MIGHT HAPPEN: NOT AT ALL
1. FEELING NERVOUS, ANXIOUS, OR ON EDGE: SEVERAL DAYS
2. NOT BEING ABLE TO STOP OR CONTROL WORRYING: NOT AT ALL
6. BECOMING EASILY ANNOYED OR IRRITABLE: NOT AT ALL

## 2018-08-23 ASSESSMENT — PATIENT HEALTH QUESTIONNAIRE - PHQ9: 5. POOR APPETITE OR OVEREATING: NOT AT ALL

## 2018-08-23 NOTE — TELEPHONE ENCOUNTER
Called patient, relayed message & Samira just wanted PCP to be informed. FYI- she states a consent was signed on 5/21 and I do see it scanned in on 5/24 in media.   Marilyn Small RN

## 2018-08-23 NOTE — PATIENT INSTRUCTIONS
[email or web address]    Orders Placed This Encounter     TDAP VACCINE (ADACEL)     ZOSTER VACCINE RECOMBINANT ADJUVANTED IM NJX     Lipid panel reflex to direct LDL Fasting     Last Lab Result: No results found for: LDL     Order Specific Question:   Perform labs while fasting or non-fasting?     Answer:   Fasting     PSA, screen     Hemoglobin A1c     Last Lab Result: No results found for: A1C     Hepatitis C Screen Reflex to HCV RNA Quant and Genotype     The Hepatitis C Screen testing will be used for patients born between 1945 and 1965 following the CDC recommendations. HEP C screening testing can also be ordered for any patient for which it is clinically recommended.     HIV Screening     M Tuberculosis by [facility]     GASTROENTEROLOGY ADULT REF PROCEDURE ONLY Other (cologard); (cologard)                                      Dietary Approaches to Stop Hypertension (The DASH Diet)   What is hypertension?   Hypertension is blood pressure that keeps being higher than normal. Blood pressure is the force of blood against artery walls as the heart pumps blood through the body. Blood pressure can be unhealthy if it is above 120/80. The higher your blood pressure, the greater the health risk.   High blood pressure can be controlled if you take these steps:   Maintain a healthy weight.   Are physically active.   Follow a healthy eating plan, which includes foods that do not have a lot of salt and sodium.   Do not drink a lot of alcohol.   Diet affects high blood pressure. Following the DASH diet and reducing the amount of sodium in your diet will help lower your blood pressure. It will also help prevent high blood pressure.   What is the DASH diet?   Dietary Approaches to Stop Hypertension (DASH) is a diet that is low in saturated fat, cholesterol, and total fat. It emphasizes fruits, vegetables, and low-fat dairy foods. The DASH diet also includes whole-grain products, fish, poultry, and nuts. It encourages fewer  servings of red meat, sweets, and sugar-containing beverages. It is rich in magnesium, potassium, and calcium, as well as protein and fiber.   How do I get started on the DASH diet?   The DASH diet requires no special foods and has no hard-to-follow recipes. Start by seeing how DASH compares with your current eating habits.   The DASH eating plan illustrated below is based on a diet of 2,000 calories a day. Your healthcare provider or a dietitian can help you determine how many calories a day you need. Most adults need somewhere between 1600 and 2800 calories a day. Serving sizes for different foods vary from 1/2 cup to 1 and 1/4 cups. Check product nutrition labels for serving sizes and the number of calories per serving.                      Number of        Examples of  Food Group      servings         serving size  ----------------------------------------------------------------    Grains and      7 to 8 per day   1 slice of bread  grain products                   1 cup ready-to-eat cold cereal                                   1/2 cup cooked rice, pasta,                                   or cereal    Vegetables      4 to 5 per day   1 cup raw leafy vegetable                                   1/2 cup cooked vegetable                                   6 ounces (oz) vegetable juice      Fruits          4 to 5 per day   1 medium fruit                                   1/4 cup dried fruit                                   1/2 cup fresh, frozen, or                                   canned fruit                                   6 oz fruit juice      Low-fat or      2 to 3 per day   8 oz milk  fat-free                         1 cup yogurt  dairy foods                      1 and 1/2 oz cheese    Lean meats,  poultry,        2 or fewer per   3 oz cooked lean meat,  or fish         day              skinless poultry, or fish    Nuts, seeds,    4 to 5 per week  1/3 cup or 1 and 1/2 oz nuts  and dry beans                     1 tablespoon or 1/2 oz seeds                                   1/2 cup cooked dry beans    Fats and oils   2 to 3 per day   1 teaspoon soft margarine                                   1 tablespoon low-fat mayonnaise                                   2 tablespoons light salad                                   dressing                                   1 teaspoon vegetable oil    Sweets          5 per week       1 tablespoon sugar                                   1 tablespoon jelly or jam                                   1/2 oz jelly beans                                   8 oz lemonade  ----------------------------------------------------------------  Make changes gradually. Here are some suggestions that might help:   If you now eat 1 or 2 servings of vegetables a day, add a serving at lunch and another at dinner.   If you have not been eating fruit regularly, or have only juice at breakfast, add a serving to your meals or have it as a snack.   Drink milk or water with lunch or dinner instead of soda, sugar-sweetened tea, or alcohol. Choose low-fat (1%) or fat-free (nonfat) dairy products so that you are eating fewer calories and less saturated fat, total fat, and cholesterol.   Read food labels on margarines and salad dressings to choose products lowest in fat.   If you now eat large portions of meat, slowly cut back--by a half or a third at each meal. Limit meat to 6 ounces a day (two 3-ounce servings). Three to 4 ounces is about the size of a deck of cards.   Have 2 or more meatless meals each week. Increase servings of vegetables, rice, pasta, and beans in all meals. Try casseroles, pasta, and stir-oviedo dishes, which have less meat and more vegetables, grains, and beans.   Use fruits canned in their own juice. Fresh fruits require little or no preparation. Dried fruits are a good choice to carry with you or to have ready in the car.   Try these snacks ideas: unsalted pretzels or nuts mixed with mile miranda  crackers, low-fat and fat-free yogurt or frozen yogurt, popcorn with no salt or butter added, and raw vegetables.   Choose whole-grain foods to get more nutrients, including minerals and fiber. For example, choose whole-wheat bread, whole-grain cereals, or brown rice.   Use fresh, frozen, or no-salt-added canned vegetables.   Remember to also reduce the salt and sodium in your diet. Try to have no more than 2000 milligrams (mg) of sodium per day, with a goal of further reducing the sodium to 1500 mg per day. Three important ways to reduce sodium are:   Eat food products with reduced-sodium or no salt added.   Use less salt when you prepare foods and do not add salt to your food at the table.   Read food labels. Aim for foods that contain less than 5% of the daily value of sodium.   The DASH eating plan is not designed for weight loss. But it contains many lower-calorie foods, such as fruits and vegetables. You can make it lower in calories by replacing high-calorie foods with more fruits and vegetables. Some ideas to increase fruits and vegetables and decrease calories include:   Eat a medium apple instead of 4 shortbread cookies. You'll save 80 calories.   Eat 1/4 cup of dried apricots instead of a 2-ounce bag of pork rinds. You'll save 230 calories.   Have a hamburger that's 3 ounces instead of 6 ounces. Add a 1/2 cup serving of carrots and a 1/2 cup serving of spinach. You'll save more than 200 calories.   Instead of 5 ounces of chicken, have a stir oviedo with 2 ounces of chicken and 1 and 1/2 cups of raw vegetables. Use just a small amount of vegetable oil. You'll save 50 calories.   Have a 1/2 cup serving of low-fat frozen yogurt instead of a 1-and-1/2-ounce chocolate bar. You'll save about 110 calories.   Use low-fat or fat-free condiments, such as fat-free salad dressings.   Eat smaller portions. Cut back gradually.   Use food labels to compare fat and calorie content in packaged foods. Items marked low fat or  fat free may be lower in fat but not lower in calories than their regular versions.   Limit foods with lots of added sugar, such as pies, flavored yogurts, candy bars, ice cream, sherbet, regular soft drinks, and fruit drinks.   Drink water or club soda instead of cola or other soda drinks.     For more information, see the National Heart, Lung, and Blood [place] Web site at: http://www.nhlbi.nih.gov/health/public/heart/hbp/dash/.

## 2018-08-23 NOTE — MR AVS SNAPSHOT
After Visit Summary   8/23/2018    Abhi Pabon    MRN: [id number]           Patient Information     Date Of Birth          1960        Visit Information        Provider Department      8/23/2018 4:00 PM Kristopher Cook MD [facility]        Today's Diagnoses     Routine history and physical examination of adult    -  1    Hypertension goal BP (blood pressure) < 140/90        Class 1 obesity due to excess calories without serious comorbidity with body mass index (BMI) of 33.0 to 33.9 in adult        Other abnormal glucose        Special screening for malignant neoplasm of prostate        Screen for colon cancer        Need for hepatitis C screening test        Screening for HIV (human immunodeficiency virus)        Need for prophylactic vaccination with tetanus-diphtheria (TD)        Screening examination for pulmonary tuberculosis        Need for shingles vaccine          Care Instructions    [email or web address]    Orders Placed This Encounter     TDAP VACCINE (ADACEL)     ZOSTER VACCINE RECOMBINANT ADJUVANTED IM NJX     Lipid panel reflex to direct LDL Fasting     Last Lab Result: No results found for: LDL     Order Specific Question:   Perform labs while fasting or non-fasting?     Answer:   Fasting     PSA, screen     Hemoglobin A1c     Last Lab Result: No results found for: A1C     Hepatitis C Screen Reflex to HCV RNA Quant and Genotype     The Hepatitis C Screen testing will be used for patients born between 1945 and 1965 following the CDC recommendations. HEP C screening testing can also be ordered for any patient for which it is clinically recommended.     HIV Screening     M Tuberculosis by Quantiferon     GASTROENTEROLOGY ADULT REF PROCEDURE ONLY Other (cologard); (cologard)                                      Dietary Approaches to Stop Hypertension (The DASH Diet)   What is hypertension?   Hypertension is blood pressure that keeps being higher than  normal. Blood pressure is the force of blood against artery walls as the heart pumps blood through the body. Blood pressure can be unhealthy if it is above 120/80. The higher your blood pressure, the greater the health risk.   High blood pressure can be controlled if you take these steps:   Maintain a healthy weight.   Are physically active.   Follow a healthy eating plan, which includes foods that do not have a lot of salt and sodium.   Do not drink a lot of alcohol.   Diet affects high blood pressure. Following the DASH diet and reducing the amount of sodium in your diet will help lower your blood pressure. It will also help prevent high blood pressure.   What is the DASH diet?   Dietary Approaches to Stop Hypertension (DASH) is a diet that is low in saturated fat, cholesterol, and total fat. It emphasizes fruits, vegetables, and low-fat dairy foods. The DASH diet also includes whole-grain products, fish, poultry, and nuts. It encourages fewer servings of red meat, sweets, and sugar-containing beverages. It is rich in magnesium, potassium, and calcium, as well as protein and fiber.   How do I get started on the DASH diet?   The DASH diet requires no special foods and has no hard-to-follow recipes. Start by seeing how DASH compares with your current eating habits.   The DASH eating plan illustrated below is based on a diet of 2,000 calories a day. Your healthcare provider or a dietitian can help you determine how many calories a day you need. Most adults need somewhere between 1600 and 2800 calories a day. Serving sizes for different foods vary from 1/2 cup to 1 and 1/4 cups. Check product nutrition labels for serving sizes and the number of calories per serving.                      Number of        Examples of  Food Group      servings         serving size  ----------------------------------------------------------------    Grains and      7 to 8 per day   1 slice of bread  grain products                   1 cup  ready-to-eat cold cereal                                   1/2 cup cooked rice, pasta,                                   or cereal    Vegetables      4 to 5 per day   1 cup raw leafy vegetable                                   1/2 cup cooked vegetable                                   6 ounces (oz) vegetable juice      Fruits          4 to 5 per day   1 medium fruit                                   1/4 cup dried fruit                                   1/2 cup fresh, frozen, or                                   canned fruit                                   6 oz fruit juice      Low-fat or      2 to 3 per day   8 oz milk  fat-free                         1 cup yogurt  dairy foods                      1 and 1/2 oz cheese    Lean meats,  poultry,        2 or fewer per   3 oz cooked lean meat,  or fish         day              skinless poultry, or fish    Nuts, seeds,    4 to 5 per week  1/3 cup or 1 and 1/2 oz nuts  and dry beans                    1 tablespoon or 1/2 oz seeds                                   1/2 cup cooked dry beans    Fats and oils   2 to 3 per day   1 teaspoon soft margarine                                   1 tablespoon low-fat mayonnaise                                   2 tablespoons light salad                                   dressing                                   1 teaspoon vegetable oil    Sweets          5 per week       1 tablespoon sugar                                   1 tablespoon jelly or jam                                   1/2 oz jelly beans                                   8 oz lemonade  ----------------------------------------------------------------  Make changes gradually. Here are some suggestions that might help:   If you now eat 1 or 2 servings of vegetables a day, add a serving at lunch and another at dinner.   If you have not been eating fruit regularly, or have only juice at breakfast, add a serving to your meals or have it as a snack.   Drink milk or water with  lunch or dinner instead of soda, sugar-sweetened tea, or alcohol. Choose low-fat (1%) or fat-free (nonfat) dairy products so that you are eating fewer calories and less saturated fat, total fat, and cholesterol.   Read food labels on margarines and salad dressings to choose products lowest in fat.   If you now eat large portions of meat, slowly cut back--by a half or a third at each meal. Limit meat to 6 ounces a day (two 3-ounce servings). Three to 4 ounces is about the size of a deck of cards.   Have 2 or more meatless meals each week. Increase servings of vegetables, rice, pasta, and beans in all meals. Try casseroles, pasta, and stir-oviedo dishes, which have less meat and more vegetables, grains, and beans.   Use fruits canned in their own juice. Fresh fruits require little or no preparation. Dried fruits are a good choice to carry with you or to have ready in the car.   Try these snacks ideas: unsalted pretzels or nuts mixed with raisins, mile crackers, low-fat and fat-free yogurt or frozen yogurt, popcorn with no salt or butter added, and raw vegetables.   Choose whole-grain foods to get more nutrients, including minerals and fiber. For example, choose whole-wheat bread, whole-grain cereals, or brown rice.   Use fresh, frozen, or no-salt-added canned vegetables.   Remember to also reduce the salt and sodium in your diet. Try to have no more than 2000 milligrams (mg) of sodium per day, with a goal of further reducing the sodium to 1500 mg per day. Three important ways to reduce sodium are:   Eat food products with reduced-sodium or no salt added.   Use less salt when you prepare foods and do not add salt to your food at the table.   Read food labels. Aim for foods that contain less than 5% of the daily value of sodium.   The DASH eating plan is not designed for weight loss. But it contains many lower-calorie foods, such as fruits and vegetables. You can make it lower in calories by replacing high-calorie foods  with more fruits and vegetables. Some ideas to increase fruits and vegetables and decrease calories include:   Eat a medium apple instead of 4 shortbread cookies. You'll save 80 calories.   Eat 1/4 cup of dried apricots instead of a 2-ounce bag of pork rinds. You'll save 230 calories.   Have a hamburger that's 3 ounces instead of 6 ounces. Add a 1/2 cup serving of carrots and a 1/2 cup serving of spinach. You'll save more than 200 calories.   Instead of 5 ounces of chicken, have a stir oviedo with 2 ounces of chicken and 1 and 1/2 cups of raw vegetables. Use just a small amount of vegetable oil. You'll save 50 calories.   Have a 1/2 cup serving of low-fat frozen yogurt instead of a 1-and-1/2-ounce chocolate bar. You'll save about 110 calories.   Use low-fat or fat-free condiments, such as fat-free salad dressings.   Eat smaller portions. Cut back gradually.   Use food labels to compare fat and calorie content in packaged foods. Items marked low fat or fat free may be lower in fat but not lower in calories than their regular versions.   Limit foods with lots of added sugar, such as pies, flavored yogurts, candy bars, ice cream, sherbet, regular soft drinks, and fruit drinks.   Drink water or club soda instead of cola or other soda drinks.     For more information, see the National Heart, Lung, and Blood [place] Web site at: http://www.nhlbi.nih.gov/health/public/heart/hbp/dash/.             Follow-ups after your visit        Additional Services     GASTROENTEROLOGY ADULT REF PROCEDURE ONLY Other (cologard); (cologard)       Last Lab Result: Creatinine (mg/dL)       Date                     Value                 05/21/2018               0.82             ----------  There is no height or weight on file to calculate BMI.     Needed:  No  Language:  English    Patient will be contacted to schedule procedure.     Please be aware that coverage of these services is subject to the terms and limitations of your  "health insurance plan.  Call member services at your health plan with any benefit or coverage questions.  Any procedures must be performed at a [facility] facility OR coordinated by your clinic's referral office.    Please bring the following with you to your appointment:    (1) Any X-Rays, CTs or MRIs which have been performed.  Contact the facility where they were done to arrange for  prior to your scheduled appointment.    (2) List of current medications   (3) This referral request   (4) Any documents/labs given to you for this referral                  Who to contact     If you have questions or need follow up information about today's clinic visit or your schedule please contact [facility] directly at [phone].  Normal or non-critical lab and imaging results will be communicated to you by MyChart, letter or phone within 4 business days after the clinic has received the results. If you do not hear from us within 7 days, please contact the clinic through MyChart or phone. If you have a critical or abnormal lab result, we will notify you by phone as soon as possible.  Submit refill requests through [employer] or call your pharmacy and they will forward the refill request to us. Please allow 3 business days for your refill to be completed.          Additional Information About Your Visit        Care EveryWhere ID     This is your Care EveryWhere ID. This could be used by other organizations to access your [facility] medical records  [id number]        Your Vitals Were     Pulse Temperature Height BMI (Body Mass Index)          88 98  F (36.7  C) (Oral) 5' 9.69\" (1.77 m) 33.73 kg/m2         Blood Pressure from Last 3 Encounters:   08/23/18 142/84   07/23/18 (!) 153/95   06/20/18 142/89    Weight from Last 3 Encounters:   08/23/18 233 lb (105.7 kg)   07/23/18 236 lb (107 kg)   06/20/18 229 lb 6 oz (104 kg)              We Performed the Following     GASTROENTEROLOGY ADULT REF PROCEDURE ONLY " Other (cologard); (cologard)     Hemoglobin A1c     Hepatitis C Screen Reflex to HCV RNA Quant and Genotype     HIV Screening     Lipid panel reflex to direct LDL Fasting     M Tuberculosis by Quantiferon     PSA, screen     TDAP VACCINE (ADACEL)     ZOSTER VACCINE RECOMBINANT ADJUVANTED IM NJX        Primary Care Provider Office Phone # Fax #    [facility] [phone]       [address] [place] [id number]        Equal Access to Services     TRACI PELAYO : Hadii aad ku hadasho Soomaali, waaxda luqadaha, qaybta kaalmada adeegyada, waxay idiin hayaan adeeg kharash lasarah henderson. So [facility] [phone].    ATENCIÓN: Si marianala isabelle, tiene a perez disposición servicios gratuitos de asistencia lingüística. Iraidaame al [phone].    We comply with applicable federal civil rights laws and [place] laws. We do not discriminate on the basis of race, color, national origin, age, disability, sex, sexual orientation, or gender identity.            Thank you!     Thank you for choosing [facility]  for your care. Our goal is always to provide you with excellent care. Hearing back from our patients is one way we can continue to improve our services. Please take a few minutes to complete the written survey that you may receive in the mail after your visit with us. Thank you!             Your Updated Medication List - Protect others around you: Learn how to safely use, store and throw away your medicines at www.disposemymeds.org.          This list is accurate as of 8/23/18  5:00 PM.  Always use your most recent med list.                   Brand Name Dispense Instructions for use Diagnosis    amLODIPine 5 MG tablet    NORVASC    90 tablet    Take 1 tablet (5 mg) by mouth daily    Hypertension goal BP (blood pressure) < 140/90       lisinopril 20 MG tablet    PRINIVIL/ZESTRIL    180 tablet    Take 1 tablet (20 mg) by mouth 2 times daily    Hypertension goal BP (blood pressure) <  140/90       triamcinolone 0.1 % cream    KENALOG    453 g    Apply sparingly to affected area tid prn.    Irritant contact dermatitis, unspecified trigger

## 2018-08-23 NOTE — PROGRESS NOTES
SUBJECTIVE:   CC: Abhi Pabon is an 58 year old male who presents for preventative health visit.     Physical   Annual:     Getting at least 3 servings of Calcium per day:  Yes    Bi-annual eye exam:  Yes    Dental care twice a year:  Yes    Sleep apnea or symptoms of sleep apnea:  Daytime drowsiness    Diet:  Regular (no restrictions)    Frequency of exercise:  4-5 days/week    Duration of exercise:  15-30 minutes    Taking medications regularly:  Yes    Medication side effects:  Other    Additional concerns today:  No      Lab Results   Component Value Date    A1C 5.8 08/23/2018     Hypertension. Pt has history of HTN with last visit , 7/23/2018, having a BP of 153/95 and current BP of 142/84. Reports off hand that intrusive anxiety had increased his BP historically.     Pre-diabetes. Pt has A1c of 5.8% and had elevated glucose of 115 mg/dL on 5/21/2018. Pt has trending weight decrease.    Anxiety. Family concerned of OCD due to obsessive tendencies towards money and anxious personality. Pt is not compliant with treatment.        Past/recent records reviewed and discussed for -- immunizations.   Shingrix and TDAP      Today's PHQ-2 Score:   PHQ-2 ( 1999 Pfizer) 8/23/2018   Q1: Little interest or pleasure in doing things 0   Q2: Feeling down, depressed or hopeless 0   PHQ-2 Score 0   Q1: Little interest or pleasure in doing things Not at all   Q2: Feeling down, depressed or hopeless Not at all   PHQ-2 Score 0       Abuse: Current or Past(Physical, Sexual or Emotional)- No  Do you feel safe in your environment - Yes    Social History   Substance Use Topics     Smoking status: Never Smoker     Smokeless tobacco: Never Used     Alcohol use Yes     Alcohol Use 8/23/2018   If you drink alcohol do you typically have greater than 3 drinks per day OR greater than 7 drinks per week? No   No flowsheet data found.    Last PSA: No results found for: PSA    Reviewed orders with patient. Reviewed health maintenance and  updated orders accordingly - Yes  Labs reviewed in EPIC  BP Readings from Last 3 Encounters:   08/23/18 142/84   07/23/18 (!) 153/95   06/20/18 142/89    Wt Readings from Last 3 Encounters:   08/23/18 105.7 kg (233 lb)   07/23/18 107 kg (236 lb)   06/20/18 104 kg (229 lb 6 oz)                  Patient Active Problem List   Diagnosis     Hypertension goal BP (blood pressure) < 140/90     Irritant contact dermatitis, unspecified trigger     Eczema, unspecified type     Family history of colon cancer     Anxiety     Class 1 obesity due to excess calories without serious comorbidity with body mass index (BMI) of 33.0 to 33.9 in adult     History reviewed. No pertinent surgical history.    Social History   Substance Use Topics     Smoking status: Never Smoker     Smokeless tobacco: Never Used     Alcohol use Yes     Family History   Problem Relation Age of Onset     Hypertension Mother      Ovarian Cancer Mother      Diabetes Father      Hypertension Father      Cancer Father      Cervical Cancer Sister      Anxiety Disorder Sister      Cerebrovascular Disease Sister      HEART DISEASE Sister      Anxiety Disorder Sister          Current Outpatient Prescriptions   Medication Sig Dispense Refill     amLODIPine (NORVASC) 5 MG tablet Take 1 tablet (5 mg) by mouth daily 90 tablet 3     lisinopril (PRINIVIL/ZESTRIL) 20 MG tablet Take 1 tablet (20 mg) by mouth 2 times daily 180 tablet 3     triamcinolone (KENALOG) 0.1 % cream Apply sparingly to affected area tid prn. 453 g 0     Allergies   Allergen Reactions     Penicillins Unknown     Recent Labs   Lab Test  08/23/18   1607  05/21/18   1546   A1C  5.8*   --    CR   --   0.82   GFRESTIMATED   --   >90   GFRESTBLACK   --   >90   POTASSIUM   --   4.3        Reviewed and updated as needed this visit by clinical staff         Reviewed and updated as needed this visit by Provider            Review of Systems   Constitutional: Negative for chills and fever.   HENT: Negative for  congestion, ear pain, hearing loss and sore throat.    Eyes: Negative for pain and visual disturbance.   Respiratory: Negative for cough and shortness of breath.    Cardiovascular: Negative for chest pain, palpitations and peripheral edema.   Gastrointestinal: Negative for abdominal pain, constipation, diarrhea, heartburn, hematochezia and nausea.   Genitourinary: Negative for discharge, dysuria, frequency, genital sores, hematuria, impotence and urgency.   Musculoskeletal: Negative for arthralgias, joint swelling and myalgias.   Skin: Positive for rash.   Neurological: Negative for dizziness, headaches and paresthesias.   Psychiatric/Behavioral: Negative for mood changes. The patient is not nervous/anxious.      This document serves as a record of the services and decisions personally performed and made by Umesh Cook MD. It was created on their behalf by Negrito Charles, a trained medical scribe. The creation of this document is based the provider's statements to the medical scribe.  Negrito Charles August 23, 2018 4:32 PM         OBJECTIVE:   There were no vitals taken for this visit.    Physical Exam   Constitutional: He is oriented to person, place, and time. He appears well-developed and well-nourished. No distress.   Overweight   HENT:   Right Ear: Tympanic membrane and external ear normal.   Left Ear: Tympanic membrane and external ear normal.   Nose: Nose normal.   Mouth/Throat: Oropharynx is clear and moist. No oral lesions. No oropharyngeal exudate.   Eyes: Conjunctivae are normal. Pupils are equal, round, and reactive to light. Right eye exhibits no discharge. Left eye exhibits no discharge.   Neck: Neck supple. No tracheal deviation present. No thyromegaly present.   Cardiovascular: Normal rate, regular rhythm, S1 normal, S2 normal, normal heart sounds and normal pulses.  Exam reveals no S3 and no S4.    No murmur heard.  Pulmonary/Chest: Effort normal and breath sounds normal. No respiratory distress. He  has no wheezes. He has no rales.   Abdominal: Soft. Bowel sounds are normal. He exhibits no mass. There is no hepatosplenomegaly. There is no tenderness.   Musculoskeletal: Normal range of motion. He exhibits no edema or deformity.   Lymphadenopathy:     He has no cervical adenopathy.   Neurological: He is alert and oriented to person, place, and time. He has normal strength and normal reflexes. He exhibits normal muscle tone.   Skin: Skin is warm and dry. Rash noted. No lesion noted.        Psychiatric: He has a normal mood and affect. His speech is normal. Judgment and thought content normal. Cognition and memory are normal.   Rectal: Pt declined    Diagnostic Test Results:  Results for orders placed or performed in visit on 08/23/18 (from the past 24 hour(s))   Hemoglobin A1c   Result Value Ref Range    Hemoglobin A1C 5.8 (H) 0 - 5.6 %       ASSESSMENT/PLAN:   (Z00.00) Routine history and physical examination of adult  (primary encounter diagnosis)  Comment: Pt is overweight with history of anxiety, HTN, and pre-diabetes. I counseled the pt on a DASH diet as well as weight loss goals to avoid a diabetes Dx. I counseled the pt on HIV and Hep C screening, the pt agreed to be screened for HIV and Hep C. Furthermore, I discussed the Shingrix and TDAP vaccines of which the pt complied with.   Plan: Lipid panel reflex to direct LDL Fasting        HIV and Hep C Screening         Follow up pending results      (I10) Hypertension goal BP (blood pressure) < 140/90  Comment: I counseled the pt on weight loss as well as a DASH diet to lower BP and reach BP goal of <140/90.   Plan: Lipid panel reflex to direct LDL Fasting        Follow up pending results        DASH Diet    (E66.09,  Z68.33) Class 1 obesity due to excess calories without serious comorbidity with body mass index (BMI) of 33.0 to 33.9 in adult  Comment: I counseled the pt on a weight loss goal of ~20 lbs.  Plan: Lipid panel reflex to direct LDL Fasting,          Hemoglobin A1c        Follow up pending results    (R73.09) Other abnormal glucose  Comment: Pt had BG level of 115 mg/dL on 5/21/2018 and A1c was borderline diabetic at 5.8%  Plan: Dietary changes            (Z12.5) Special screening for malignant neoplasm of prostate  Comment: Pt denies symptoms associated with prostate cancer. Declines rectal exam.   Plan: PSA, screen   Follow up, pending results    (Z12.11) Screen for colon cancer  Comment: I counseled pt on the three options of colon cancer screening. Pt had last colonoscopy 17 yrs ago, and expressed interest in pursuing cologuard. I detailed the process on wrap-up.  Plan: GASTROENTEROLOGY ADULT REF PROCEDURE ONLY Other        (cologard); (cologard)        Follow up pending results    (Z11.59) Need for hepatitis C screening test  Comment: I spoke to the pt about Hep C screening. Pt was agreeable to Hep C screening.  Plan: Hepatitis C Screen Reflex to HCV RNA Quant and         Genotype        Follow up pending results    (Z11.4) Screening for HIV (human immunodeficiency virus)  Comment: I discussed HIV screening with the pt. Pt obliged my interest in the HIV screening.   Plan: HIV Screening        Follow up pending results.     (Z23) Need for prophylactic vaccination with tetanus-diphtheria (TD)  Comment: I spoke to the pt about vaccinations and he stated that his last TDAP was >10 yrs ago.   Plan: TDAP VACCINE (ADACEL)        Vaccine given    (Z11.1) Screening examination for pulmonary tuberculosis  Comment: Pt expressed interest in having a Quantiferon test done to eliminate possibility of having TB.   Plan: M Tuberculosis by Quantiferon        Follow up pending results    (Z23) Need for shingles vaccine  Comment: I addressed the new Shingrix vaccine with the pt and he was open to having it done.   Plan: ZOSTER VACCINE RECOMBINANT ADJUVANTED IM NJX        Vaccine given        COUNSELING:   Reviewed preventive health counseling, as reflected in patient  "instructions       Bed Bugs       Regular exercise       Healthy diet/nutrition       Immunizations    Vaccinated for: TDAP and Shingrix           Consider Hep C screening for patients born between 1945 and 1965       HIV screeninx in teen years, 1x in adult years, and at intervals if high risk       Colon cancer screening       Prostate cancer screening    BP Readings from Last 1 Encounters:   18 (!) 153/95     Estimated body mass index is 33.86 kg/(m^2) as calculated from the following:    Height as of 18: 5' 10\" (1.778 m).    Weight as of 18: 236 lb (107 kg).      Weight management plan: Discussed healthy diet and exercise guidelines and patient will follow up in 6 months in clinic to re-evaluate.     reports that he has never smoked. He has never used smokeless tobacco.      Counseling Resources:  ATP IV Guidelines  Pooled Cohorts Equation Calculator  FRAX Risk Assessment  ICSI Preventive Guidelines  Dietary Guidelines for Americans,   USDA's MyPlate  ASA Prophylaxis  Lung CA Screening    The information in this document, created by the medical scribe for me, accurately reflects the services I personally performed and the decisions made by me. I have reviewed and approved this document for accuracy prior to leaving the patient care area.    Kristopher Cook MD, MD  [facility]  Answers for HPI/ROS submitted by the patient on 2018   PHQ-2 Score: 0    "

## 2018-08-23 NOTE — LETTER
"August 28, 2018      Abhi Rodneyenson  [address] [place] [address]          Dear Mr. Pabon     The results of your recent lab tests were overall within normal limits. As we discussed the blood sugar was border lilne and diet and exercise are important.  Decrease the level of carbohydrates in your diet. The Tuberculosis test was negative. Your  prostate test is normal.       The cholesterol is overall good.     Lipid goals:     Cholesterol: Desirable is less than 200, Borderline is 200-240   HDL (Good Cholesterol): Desirable is greater than 40   LDL (Bad Cholesterol): Desirable is less than 130, Borderline 130-160   Triglycerides: Desirable is less than 150,  Borderline is 150-300     Ways to improve your cholesterol...     1- Eats less saturated fats (including avoiding \"trans\" fats).     2 - Eat more unsaturated fats  - found in vegetables, grains, and tree nuts.   Also by replacing butter with canola oil or olive oil.     3 - Eat more nuts.   1-2 ounces (a small handful) of almonds, walnuts, hazelnuts or pecans once a day in place of other less healthy snacks.     4 - Eat more high fiber foods - vegetables and whole grains including oat bran, oats, beans, peas, and flax seed.     5 - Eat more fish - such as salmon, tuna, mackerel, and sardines.  1 or 2 six ounce servings per week is a healthy replacement for other proteins.     6 - Exercise for at least 120 minutes per week - which is equal to 30 minutes 4 days per week.     We will recheck the blood sugars in October. Enclosed is a copy of these results.     Results for orders placed or performed in visit on 08/23/18   Lipid panel reflex to direct LDL Fasting   Result Value Ref Range    Cholesterol 213 (H) <200 mg/dL    Triglycerides 177 (H) <150 mg/dL    HDL Cholesterol 54 >39 mg/dL    LDL Cholesterol Calculated 124 (H) <100 mg/dL    Non HDL Cholesterol 159 (H) <130 mg/dL   PSA, screen   Result Value Ref Range    PSA 0.68 0 - 4 ug/L   Hemoglobin " A1c   Result Value Ref Range    Hemoglobin A1C 5.8 (H) 0 - 5.6 %   Hepatitis C Screen Reflex to HCV RNA Quant and Genotype   Result Value Ref Range    Hepatitis C Antibody Nonreactive NR^Nonreactive   HIV Screening   Result Value Ref Range    HIV Antigen Antibody Combo Nonreactive NR^Nonreactive       M Tuberculosis by Quantiferon   Result Value Ref Range    M Tuberculosis Result Negative NEG^Negative    M Tuberculosis Antigen Value 0.01 IU/mL         If you have any further questions or problems, please contact our office.     Sincerely,       Kristopher Cook MD/wen

## 2018-08-24 LAB
CHOLEST SERPL-MCNC: 213 MG/DL
HDLC SERPL-MCNC: 54 MG/DL
LDLC SERPL CALC-MCNC: 124 MG/DL
NONHDLC SERPL-MCNC: 159 MG/DL
PSA SERPL-ACNC: 0.68 UG/L (ref 0–4)
TRIGL SERPL-MCNC: 177 MG/DL

## 2018-08-24 ASSESSMENT — ANXIETY QUESTIONNAIRES: GAD7 TOTAL SCORE: 1

## 2018-08-24 ASSESSMENT — PATIENT HEALTH QUESTIONNAIRE - PHQ9: SUM OF ALL RESPONSES TO PHQ QUESTIONS 1-9: 1

## 2018-08-26 LAB — HCV AB SERPL QL IA: NONREACTIVE

## 2018-08-27 LAB
HIV 1+2 AB+HIV1 P24 AG SERPL QL IA: NONREACTIVE
M TB TUBERC IFN-G BLD QL: NEGATIVE
M TB TUBERC IFN-G/MITOGEN IGNF BLD: 0.01 IU/ML

## 2018-08-28 PROBLEM — Z13.6 CARDIOVASCULAR SCREENING; LDL GOAL LESS THAN 130: Status: ACTIVE | Noted: 2018-08-28

## 2018-10-18 ENCOUNTER — OFFICE VISIT (OUTPATIENT)
Dept: FAMILY MEDICINE | Facility: CLINIC | Age: 58
End: 2018-10-18
Payer: COMMERCIAL

## 2018-10-18 VITALS
DIASTOLIC BLOOD PRESSURE: 85 MMHG | BODY MASS INDEX: 34.73 KG/M2 | WEIGHT: 242.6 LBS | HEIGHT: 70 IN | SYSTOLIC BLOOD PRESSURE: 150 MMHG | TEMPERATURE: 97.5 F | HEART RATE: 68 BPM

## 2018-10-18 DIAGNOSIS — I10 HYPERTENSION GOAL BP (BLOOD PRESSURE) < 140/90: Primary | ICD-10-CM

## 2018-10-18 DIAGNOSIS — E66.09 CLASS 1 OBESITY DUE TO EXCESS CALORIES WITHOUT SERIOUS COMORBIDITY WITH BODY MASS INDEX (BMI) OF 33.0 TO 33.9 IN ADULT: ICD-10-CM

## 2018-10-18 DIAGNOSIS — Z23 NEED FOR SHINGLES VACCINE: ICD-10-CM

## 2018-10-18 DIAGNOSIS — E66.811 CLASS 1 OBESITY DUE TO EXCESS CALORIES WITHOUT SERIOUS COMORBIDITY WITH BODY MASS INDEX (BMI) OF 33.0 TO 33.9 IN ADULT: ICD-10-CM

## 2018-10-18 DIAGNOSIS — R73.9 ELEVATED BLOOD SUGAR: ICD-10-CM

## 2018-10-18 DIAGNOSIS — Z23 NEED FOR PROPHYLACTIC VACCINATION AND INOCULATION AGAINST INFLUENZA: ICD-10-CM

## 2018-10-18 PROBLEM — E66.01 MORBID OBESITY (H): Status: ACTIVE | Noted: 2018-10-18

## 2018-10-18 LAB — HBA1C MFR BLD: 6.1 % (ref 0–5.6)

## 2018-10-18 PROCEDURE — 90471 IMMUNIZATION ADMIN: CPT | Performed by: FAMILY MEDICINE

## 2018-10-18 PROCEDURE — 90682 RIV4 VACC RECOMBINANT DNA IM: CPT | Performed by: FAMILY MEDICINE

## 2018-10-18 PROCEDURE — 90472 IMMUNIZATION ADMIN EACH ADD: CPT | Performed by: FAMILY MEDICINE

## 2018-10-18 PROCEDURE — 83036 HEMOGLOBIN GLYCOSYLATED A1C: CPT | Performed by: FAMILY MEDICINE

## 2018-10-18 PROCEDURE — 99214 OFFICE O/P EST MOD 30 MIN: CPT | Mod: 25 | Performed by: FAMILY MEDICINE

## 2018-10-18 PROCEDURE — 36415 COLL VENOUS BLD VENIPUNCTURE: CPT | Performed by: FAMILY MEDICINE

## 2018-10-18 PROCEDURE — 90750 HZV VACC RECOMBINANT IM: CPT | Performed by: FAMILY MEDICINE

## 2018-10-18 RX ORDER — LISINOPRIL 20 MG/1
20 TABLET ORAL 2 TIMES DAILY
Qty: 180 TABLET | Refills: 3 | Status: SHIPPED | OUTPATIENT
Start: 2018-10-18 | End: 2019-01-16

## 2018-10-18 RX ORDER — AMLODIPINE BESYLATE 5 MG/1
5 TABLET ORAL DAILY
Qty: 90 TABLET | Refills: 3 | Status: SHIPPED | OUTPATIENT
Start: 2018-10-18 | End: 2019-01-16

## 2018-10-18 NOTE — MR AVS SNAPSHOT
After Visit Summary   10/18/2018    Abhi Pabon    MRN: [id number]           Patient Information     Date Of Birth          1960        Visit Information        Provider Department      10/18/2018 4:40 PM Kristopher Cook MD [facility]        Today's Diagnoses     Hypertension goal BP (blood pressure) < 140/90    -  1    Class 1 obesity due to excess calories without serious comorbidity with body mass index (BMI) of 33.0 to 33.9 in adult        Elevated blood sugar        Need for prophylactic vaccination and inoculation against influenza        Need for shingles vaccine          Care Instructions    Continue to check blood pressure at work.     -limit carbohydrate and salt intake.                 Follow-ups after your visit        Follow-up notes from your care team     Return in about 3 months (around 1/18/2019) for BP Recheck, weight recheck.      Who to contact     If you have questions or need follow up information about today's clinic visit or your schedule please contact [facility] directly at [phone].  Normal or non-critical lab and imaging results will be communicated to you by MyChart, letter or phone within 4 business days after the clinic has received the results. If you do not hear from us within 7 days, please contact the clinic through [employer] or phone. If you have a critical or abnormal lab result, we will notify you by phone as soon as possible.  Submit refill requests through [employer] or call your pharmacy and they will forward the refill request to us. Please allow 3 business days for your refill to be completed.          Additional Information About Your Visit        Care EveryWhere ID     This is your Care EveryWhere ID. This could be used by other organizations to access your [facility] medical records  [id number]        Your Vitals Were     Pulse Temperature Height BMI (Body Mass Index)          68 97.5  F (36.4  C)  "(Oral) 5' 9.7\" (1.77 m) 35.11 kg/m2         Blood Pressure from Last 3 Encounters:   10/18/18 150/85   08/23/18 142/84   07/23/18 (!) 153/95    Weight from Last 3 Encounters:   10/18/18 242 lb 9.6 oz (110 kg)   08/23/18 233 lb (105.7 kg)   07/23/18 236 lb (107 kg)              We Performed the Following     FLU VACCINE, (RIV4) RECOMBINANT HA  , IM (FluBlok, egg free) [61658]- >18 YRS (G recommended  50-64 YRS)     Hemoglobin A1c     Vaccine Administration, Initial [04652]     ZOSTER VACCINE RECOMBINANT ADJUVANTED IM NJX          Where to get your medicines      These medications were sent to [facility] [address]  [address] [place] [address]     Phone:  [phone]     amLODIPine 5 MG tablet    lisinopril 20 MG tablet          Primary Care Provider Office Phone # Fax #    [facility] [phone]       [address] [place] [id number]        Equal Access to Services     TRACI PELAYO AH: Hadii aad ku hadasho Soomaali, waaxda luqadaha, qaybta kaalmada adeegyada, waxay idiin hayaan adebebeto brownaratemitope lasarah ah. So [facility] [phone].    ATENCIÓN: Si habla español, tiene a perez disposición servicios gratuitos de asistencia lingüística. Abhay al [phone].    We comply with applicable federal civil rights laws and [place] laws. We do not discriminate on the basis of race, color, national origin, age, disability, sex, sexual orientation, or gender identity.            Thank you!     Thank you for choosing [facility]  for your care. Our goal is always to provide you with excellent care. Hearing back from our patients is one way we can continue to improve our services. Please take a few minutes to complete the written survey that you may receive in the mail after your visit with us. Thank you!             Your Updated Medication List - Protect others around you: Learn how to safely use, store and " throw away your medicines at www.disposemymeds.org.          This list is accurate as of 10/18/18  5:19 PM.  Always use your most recent med list.                   Brand Name Dispense Instructions for use Diagnosis    amLODIPine 5 MG tablet    NORVASC    90 tablet    Take 1 tablet (5 mg) by mouth daily    Hypertension goal BP (blood pressure) < 140/90       lisinopril 20 MG tablet    PRINIVIL/ZESTRIL    180 tablet    Take 1 tablet (20 mg) by mouth 2 times daily    Hypertension goal BP (blood pressure) < 140/90       triamcinolone 0.1 % cream    KENALOG    453 g    Apply sparingly to affected area tid prn.    Irritant contact dermatitis, unspecified trigger

## 2018-10-18 NOTE — PROGRESS NOTES
"  SUBJECTIVE:   Abhi Pabon is a 58 year old male who presents to clinic today for the following health issues:    Patient is following up today for diet and exercise. BP at work has been in the high 120s for systolic and the low 70s for diastolic BP. Patient has been doing more strength training. Lately, he has been eating a lot of \"garbage food\", but posits that he had been eating well over the past couple of weeks. Has not had the chance to workout the way he wanted to the last two days.       Problem list and histories reviewed & adjusted, as indicated.  Additional history: as documented    Allergies   Allergen Reactions     Penicillins Unknown     Recent Labs   Lab Test  08/23/18   1607  05/21/18   1546   A1C  5.8*   --    LDL  124*   --    HDL  54   --    TRIG  177*   --    CR   --   0.82   GFRESTIMATED   --   >90   GFRESTBLACK   --   >90   POTASSIUM   --   4.3      BP Readings from Last 3 Encounters:   10/18/18 (!) 148/96   08/23/18 142/84   07/23/18 (!) 153/95    Wt Readings from Last 3 Encounters:   10/18/18 110 kg (242 lb 9.6 oz)   08/23/18 105.7 kg (233 lb)   07/23/18 107 kg (236 lb)                  Labs reviewed in EPIC    Reviewed and updated as needed this visit by clinical staff       Reviewed and updated as needed this visit by Provider         ROS:  Constitutional, HEENT, cardiovascular, pulmonary, GI, , musculoskeletal, neuro, skin, endocrine and psych systems are negative, except as otherwise noted.    This document serves as a record of the services and decisions personally performed and made by Umesh Cook MD. It was created on their behalf by Vaughn Decker, a trained medical scribe. The creation of this document is based the provider's statements to the medical scribe.  Vaughn Decker October 18, 2018 5:05 PM       OBJECTIVE:     BP (!) 148/96 (BP Location: Right arm, Patient Position: Chair, Cuff Size: Adult Regular)  Pulse 68  Temp 97.5  F (36.4  C) (Oral)  Ht 1.77 m (5' 9.7\")  Wt " 110 kg (242 lb 9.6 oz)  BMI 35.11 kg/m2  Body mass index is 35.11 kg/(m^2).  GENERAL: overweight, alert and no distress  HENT: ear canals and TM's normal, nose and mouth without ulcers or lesions  RESP: lungs clear to auscultation - no rales, rhonchi or wheezes  CV: regular rate and rhythm, normal S1 S2, no S3 or S4, no murmur, click or rub, no peripheral edema and peripheral pulses strong  MS: no gross musculoskeletal defects noted, no edema  SKIN: no suspicious lesions or rashes  PSYCH: mentation appears normal, affect normal/bright    Diagnostic Test Results:  Results for orders placed or performed in visit on 10/18/18 (from the past 24 hour(s))   Hemoglobin A1c   Result Value Ref Range    Hemoglobin A1C 6.1 (H) 0 - 5.6 %         ASSESSMENT/PLAN:   (I10) Hypertension goal BP (blood pressure) < 140/90  (primary encounter diagnosis)  Comment: /85 today, not entirely consistent with average self-BP of ~130/85. Outpatient blood pressures do a better job at reflecting actual blood pressure baseline than single in-office measurements, and based on good outpatient blood pressures, we won't be making any changes in medications. I did stress a low salt diet to help maintain blood pressure at goal.    Plan: lisinopril (PRINIVIL/ZESTRIL) 20 MG tablet,         amLODIPine (NORVASC) 5 MG tablet        -continue present medications, medications refilled        -DASH diet.     (E66.09,  Z68.33) Class 1 obesity due to excess calories without serious comorbidity with body mass index (BMI) of 33.0 to 33.9 in adult  Comment: weight trends show weight gain since last OV, likely from lipogenesis due to excess caloric intake. In hindsight, patient feels like he could have done a better job at conducting lifestyle modifications. I re-emphasized importance of caloric curtailment and aerobic exercise.    Plan: Hemoglobin A1c        -robust lifestyle modifications       -follow up in 3 months for weight recheck    (R73.9) Elevated  blood sugar  Comment: A1c at 6.1%. Diet and weight loss are going to paramount in order to avoid progression of insulin resistance.   Plan: Hemoglobin A1c        -follow up, pending results    (Z23) Need for prophylactic vaccination and inoculation against influenza  Comment: given to patient  Plan: FLU VACCINE, (RIV4) RECOMBINANT HA  , IM         (FluBlok, egg free) [58483]- >18 YRS (FMG         recommended  50-64 YRS), Vaccine         Administration, Initial [18898]            (Z23) Need for shingles vaccine  Comment: given to patient  Plan: ZOSTER VACCINE RECOMBINANT ADJUVANTED IM NJX            The information in this document, created by the medical scribe for me, accurately reflects the services I personally performed and the decisions made by me. I have reviewed and approved this document for accuracy prior to leaving the patient care area.    Kristopher Cook MD, MD  [facility]      Injectable Influenza Immunization Documentation    1.  Is the person to be vaccinated sick today?   No    2. Does the person to be vaccinated have an allergy to a component   of the vaccine?   No  Egg Allergy Algorithm Link    3. Has the person to be vaccinated ever had a serious reaction   to influenza vaccine in the past?   No    4. Has the person to be vaccinated ever had Guillain-Barré syndrome?   No    Form completed by Aston Sheffield MA

## 2018-10-18 NOTE — NURSING NOTE
Prior to injection verified patient identity using patient's name and date of birth.  Due to injection administration, patient instructed to remain in clinic for 15 minutes  afterwards, and to report any adverse reaction to me immediately.    Screening Questionnaire for Adult Immunization    Are you sick today?   No   Do you have allergies to medications, food, a vaccine component or latex?   No   Have you ever had a serious reaction after receiving a vaccination?   No   Do you have a long-term health problem with heart disease, lung disease, asthma, kidney disease, metabolic disease (e.g. diabetes), anemia, or other blood disorder?   No   Do you have cancer, leukemia, HIV/AIDS, or any other immune system problem?   No   In the past 3 months, have you taken medications that affect  your immune system, such as prednisone, other steroids, or anticancer drugs; drugs for the treatment of rheumatoid arthritis, Crohn s disease, or psoriasis; or have you had radiation treatments?   No   Have you had a seizure, or a brain or other nervous system problem?   No   During the past year, have you received a transfusion of blood or blood     products, or been given immune (gamma) globulin or antiviral drug?   No   For women: Are you pregnant or is there a chance you could become        pregnant during the next month?   No   Have you received any vaccinations in the past 4 weeks?   No     Immunization questionnaire answers were all negative.        Per orders of Dr. Cook, injection of Shingrix and FLubok given by Aston Sheffield. Patient instructed to remain in clinic for 15 minutes afterwards, and to report any adverse reaction to me immediately.       Screening performed by Aston Sheffield on 10/18/2018 at 5:24 PM.

## 2018-10-18 NOTE — LETTER
October 23, 2018      Abhi Pabon  [address] [place] [address]        Dear Abhi,       The results of your recent lab test shows a rise in the blood sugars and puts you in a prediabetes range. I would recommend meeting with out Nutritionist to review dietary recommendations for this. We will call to review. Enclosed is a copy of these results.  If you have any further questions or problems, please contact our office.     Sincerely,        Kristopher Cook MD    Results for orders placed or performed in visit on 10/18/18   Hemoglobin A1c   Result Value Ref Range    Hemoglobin A1C 6.1 (H) 0 - 5.6 %

## 2018-10-19 ENCOUNTER — TELEPHONE (OUTPATIENT)
Dept: FAMILY MEDICINE | Facility: CLINIC | Age: 58
End: 2018-10-19

## 2018-10-19 NOTE — TELEPHONE ENCOUNTER
Please call patient . Let him know his blood sugars have risen. His is in the prediabetic area. I would recommend he meet with our nutritionist if he is willing.    Umesh Cook MD

## 2018-10-19 NOTE — TELEPHONE ENCOUNTER
"Called patient and gave him message below. He states \"I knew it was going to be bad news based on how I've been eating and not exercising.\" He says that he already sees a nutritionist and they have been giving him good information. He plans to eat better and exercise, and says he's been exercising for the past 10 days. I recommended he continue to do so and follow up in 3 months (as noted in OV note). Route to provider as HALI.    Jovi Hines RN    "

## 2018-10-22 ENCOUNTER — TELEPHONE (OUTPATIENT)
Dept: FAMILY MEDICINE | Facility: CLINIC | Age: 58
End: 2018-10-22

## 2018-10-22 DIAGNOSIS — F41.9 ANXIETY: ICD-10-CM

## 2018-10-22 DIAGNOSIS — R73.03 PREDIABETES: ICD-10-CM

## 2018-10-22 DIAGNOSIS — I10 HYPERTENSION GOAL BP (BLOOD PRESSURE) < 140/90: Primary | ICD-10-CM

## 2018-10-22 NOTE — TELEPHONE ENCOUNTER
Reason for call:  Patient reporting a symptom    Symptom or request: Anxiety,  OCD    Duration (how long have symptoms been present):   Start of October  Have you been treated for this before? Yes    Additional comments: Patient is having issues with his Anxiety and OCD again.  His sister is calling to speak with Dr Cook.  She is wanting to discuss possible medications, or therapy for him.  Please let her know what is the best time to call.  Sister was crying towards the end of coversation.    Phone Number patient can be reached at:  Cell number on file:    Telephone Information:   Mobile [phone]        Best Time:  Unavailable from 10:45 to 2 pm    Can we leave a detailed message on this number:  YES    Call taken on 10/22/2018 at 8:14 AM by Ade Reid

## 2018-10-23 NOTE — TELEPHONE ENCOUNTER
Called and spoke with sister, gave her message below (there is consent to communicate). She thinks he's calling in sick to work due to anxiety. She will try to get him to be more forthcoming about his anxiety, he covers it up. Patient does have an appointment scheduled for tomorrow that Samira scheduled, but she hasn't told him about it yet so she's not sure if he will be willing to come. Route to PCP as HALI.    Jovi Hines RN

## 2018-10-23 NOTE — TELEPHONE ENCOUNTER
I will call him to try to review. He did not state any concerns at our last office visit and I had asked him specific questions about how he is doing.     Do we have the ability to review his medical concerns with his sisters.     We also found he has prediabetes and I am encouraging him to be involved with programs for improvement but he has declined at this time. He want to work on his own and with a nutritionist that he knows and already works with.     Umesh Cook MD

## 2018-10-23 NOTE — TELEPHONE ENCOUNTER
Samira called to check on the status of her message below. She had not heard back and was wondering what Dr. Cook's thoughts were on her discussion with XAVI Castro yesterday. Please call back at [phone]. She works until 2:30 pm but would like a detailed message left on her voicemail.    Thank you  Allie Zuñiga  Patient Representative

## 2018-11-13 NOTE — TELEPHONE ENCOUNTER
"Route to PCP to advise regarding mental health please.  Would a telephone visit with you to discuss this be appropriate?  Samira isn't sure she wants patient to know she contacted us, is wondering if we can contact patient as a \"follow-up\" and inquire about work, etc., use terminology like \"stress-management\" versus anxiety.    Sister Samira calls with concerns regarding patient's mental health.  She reports he has been very anxious, exhibiting OCD behaviors.  Patient was doing well over the summer, and now he has been doing worse again.  He has missed work for a few days recently, was worried he had influenza from the flu shot, and has been worried about bed bugs, which sister states happens when he is in \"one of his cycles\" because his apartment has been checked, and has never had bed bugs before. He has a history of cyclical \"confused thinking\".  She states patient has only been to a therapist after their mother passed away, and the siblings went as a family.  Otherwise, has not seen therapy.  She states he did like this therapist and would probably be open to seeing a therapist if recommended by Dr. Cook, as he respects men.  She states suggestions would likely be regarded higher from a male, as he regards them as more authoritative than females.   He doesn't really have many coping skills at home, as far as breathing, distraction, but he does love to go for nature walks with Samira, and is in a bowling league that he enjoys.    She feels she needs assistance with his mental health, that he could benefit from medication and/or therapy and is asking for help and if we could reach out to patient somehow, as she feels he will listen to Dr. Cook.  She states normally she would be fine with telling patient that she reached out, and patient is usually fine with this, but isn't sure how he will react in this case.       Gloria Leal RN    " Consent (Spinal Accessory)/Introductory Paragraph: The rationale for Mohs was explained to the patient and consent was obtained. The risks, benefits and alternatives to therapy were discussed in detail. Specifically, the risks of damage to the spinal accessory nerve, infection, scarring, bleeding, prolonged wound healing, incomplete removal, allergy to anesthesia, and recurrence were addressed. Prior to the procedure, the treatment site was clearly identified and confirmed by the patient. All components of Universal Protocol/PAUSE Rule completed.

## 2018-11-20 ENCOUNTER — TELEPHONE (OUTPATIENT)
Dept: FAMILY MEDICINE | Facility: CLINIC | Age: 58
End: 2018-11-20

## 2018-11-20 NOTE — TELEPHONE ENCOUNTER
Reason for Call:  Other     Detailed comments: Patient's sister Samira calling stating patient has an appointment with PCP tomorrow. Patient is wanting to come alone to the appointment tomorrow so Samira is respecting that. Samira wants PCP to know that she is going to drop some notes that she typed up that would be helpful for PCP to know. She is trying to be respectful of patient's independence but she also knows that patient doesn't always communicate the full picture and she wants what is best for him. Patient has met his in network deductible for the year. She is wondering if PCP has any ideas of new things to try that are in network for the patient to try through the end of the year being he has met his deductible. She states that they are trying to wean the patient away from the [facility] chiropractor that patient was seeing.     Phone Number Patient can be reached at: Other phone number:  [phone]    Best Time: before 10:15AM or after 3:00PM    Can we leave a detailed message on this number? YES    Call taken on 11/20/2018 at 9:53 AM by Juliet Friend

## 2018-11-21 NOTE — TELEPHONE ENCOUNTER
Samira, patient's sister states she is returning a missed call. She says she can be reached today from 10-10:50, or during her lunch from 1-1:30. If calling outside of those times she states to call right back if she doesn't  the phone and she will step out of her classroom to take the call.    Thanks!  Jovi Gannon

## 2018-11-21 NOTE — TELEPHONE ENCOUNTER
Called and reviewed with sister. They would like not to have patient be aware they sent formal infromation.     Concerned about his paranoia of bed bugs. Also is anxious and has hx of OCD. Would like to see if he could get into formal counseling. Family member with similar problems is on a medication that works well. Asked them to get the name of the medication.     Reviewed concerns also for his prediabetes    He cancelled appt for today    They are also concerned he is seeing a chiropractor that may be taking advantage of him financially and not giving appropriate treatment.     Called patient and left message. Encouraged him to make a follow up appt to see me.     Umesh Cook MD

## 2018-12-21 DIAGNOSIS — L24.9 IRRITANT CONTACT DERMATITIS, UNSPECIFIED TRIGGER: ICD-10-CM

## 2018-12-23 RX ORDER — TRIAMCINOLONE ACETONIDE 1 MG/G
CREAM TOPICAL
Qty: 453 G | Refills: 3 | Status: SHIPPED | OUTPATIENT
Start: 2018-12-23 | End: 2019-01-16

## 2018-12-31 ENCOUNTER — TRANSFERRED RECORDS (OUTPATIENT)
Dept: HEALTH INFORMATION MANAGEMENT | Facility: CLINIC | Age: 58
End: 2018-12-31

## 2018-12-31 ENCOUNTER — TELEPHONE (OUTPATIENT)
Dept: FAMILY MEDICINE | Facility: CLINIC | Age: 58
End: 2018-12-31

## 2018-12-31 NOTE — TELEPHONE ENCOUNTER
Patient called for a referral for dermatology for his eczema. Informed patient that we have a referral on file from 5/2018 for [facility] dermatology and gave him the phone number to call.    Faxed referral and patient's demographic information to [facility], [phone]    Thanks!  Jovi Gannon

## 2019-01-15 ENCOUNTER — TELEPHONE (OUTPATIENT)
Dept: FAMILY MEDICINE | Facility: CLINIC | Age: 59
End: 2019-01-15

## 2019-01-15 NOTE — TELEPHONE ENCOUNTER
Reason for Call:  Other     Detailed comments: Sister called in with name of anti anxiety medication that patients sibling takes. Carvedilol 25mg 2x a day. Call with questions or to discuss.    Phone Number Patient can be reached at: Cell number on file:  [phone]             Best Time: any    Can we leave a detailed message on this number? YES    Call taken on 1/15/2019 at 4:49 PM by Sylvie Carbajal

## 2019-01-16 ENCOUNTER — OFFICE VISIT (OUTPATIENT)
Dept: FAMILY MEDICINE | Facility: CLINIC | Age: 59
End: 2019-01-16
Payer: COMMERCIAL

## 2019-01-16 VITALS
HEART RATE: 72 BPM | HEIGHT: 70 IN | BODY MASS INDEX: 34.27 KG/M2 | DIASTOLIC BLOOD PRESSURE: 87 MMHG | SYSTOLIC BLOOD PRESSURE: 134 MMHG | WEIGHT: 239.4 LBS | TEMPERATURE: 98.1 F

## 2019-01-16 DIAGNOSIS — L24.9 IRRITANT CONTACT DERMATITIS, UNSPECIFIED TRIGGER: ICD-10-CM

## 2019-01-16 DIAGNOSIS — I10 HYPERTENSION GOAL BP (BLOOD PRESSURE) < 140/90: ICD-10-CM

## 2019-01-16 DIAGNOSIS — Z12.11 SCREEN FOR COLON CANCER: Primary | ICD-10-CM

## 2019-01-16 DIAGNOSIS — R73.9 ELEVATED BLOOD SUGAR: ICD-10-CM

## 2019-01-16 LAB — HBA1C MFR BLD: 6.5 % (ref 0–5.6)

## 2019-01-16 PROCEDURE — 36415 COLL VENOUS BLD VENIPUNCTURE: CPT | Performed by: FAMILY MEDICINE

## 2019-01-16 PROCEDURE — 99214 OFFICE O/P EST MOD 30 MIN: CPT | Performed by: FAMILY MEDICINE

## 2019-01-16 PROCEDURE — 83036 HEMOGLOBIN GLYCOSYLATED A1C: CPT | Performed by: FAMILY MEDICINE

## 2019-01-16 RX ORDER — AMLODIPINE BESYLATE 5 MG/1
5 TABLET ORAL DAILY
Qty: 90 TABLET | Refills: 3 | Status: SHIPPED | OUTPATIENT
Start: 2019-01-16 | End: 2019-07-03

## 2019-01-16 RX ORDER — LISINOPRIL 20 MG/1
20 TABLET ORAL 2 TIMES DAILY
Qty: 180 TABLET | Refills: 3 | Status: SHIPPED | OUTPATIENT
Start: 2019-01-16 | End: 2019-12-02

## 2019-01-16 RX ORDER — TRIAMCINOLONE ACETONIDE 1 MG/G
CREAM TOPICAL
Qty: 453 G | Refills: 6 | Status: SHIPPED | OUTPATIENT
Start: 2019-01-16 | End: 2019-12-31

## 2019-01-16 ASSESSMENT — MIFFLIN-ST. JEOR: SCORE: 1907.4

## 2019-01-16 NOTE — LETTER
January 18, 2019      Abhi Pabon  [address] [place] [address]        Dear Mr. Pabon     I called and left a message but want to get you this information before our next appintment.     The results of your recent lab test showed the A1c test has risen. It is 6.5 which does put it into the diabetic range. I would like to work with you more closely on your diet to see what changes we can make. Continue to work on the weight loss because this is helpful. You have an appointment 1-24-19 and we can review all of this at that time. If you can bring in a list of the foods you eat that would be great. I would also like to review some medications that can help control the blood sugars and even help with weight loss. I have had many patients improve there blood sugars and move out of the diabetic range with combining the weight loss, good diet and medications and then even get off the medications.   Enclosed are the results.     Results for orders placed or performed in visit on 01/16/19   Hemoglobin A1c   Result Value Ref Range    Hemoglobin A1C 6.5 (H) 0 - 5.6 %     I look forward to seeing you in the office.     Sincerely,       Kristopher Cook MD/wen

## 2019-01-16 NOTE — TELEPHONE ENCOUNTER
Chart reviewed, and RN and provider had spoken with sister Samira regarding patient's anxiety and OCD back in October & November. Patient was recommended to f/u with Dr. Cook in clinic, but had cancelled his appointment and did not reschedule.  RN returned call to Samira to discuss further and clarify, as Carvedilol is more commonly prescribed for cardiac issues, and patient is due for a visit as well.  Patient/family was instructed to return call to [facility] RN directly on the RN Call back line at [phone].     Gloria Leal RN

## 2019-01-16 NOTE — TELEPHONE ENCOUNTER
Samira calls back to RN line just prior to 7pm, states that she may have had the wrong medication, thought this was for anxiety.  She is heading out of town for a week now and states she needs nothing more at this time, will call back after her trip to discuss patient if needed.    Gloria Leal RN

## 2019-01-16 NOTE — PROGRESS NOTES
SUBJECTIVE:   Abhi Pabon is a 58 year old male who presents to clinic today for the following health issues:      Hypertension Follow-up      Outpatient blood pressures are being checked at work.  Results are last week 135/79.    Low Salt Diet: no added salt      Amount of exercise or physical activity: None- Physical Job    Problems taking medications regularly: No    Medication side effects: none    Diet: regular (no restrictions)    The patient reports that his blood pressure has been better. Home measure is about 135 systolic. Blood pressure recheck today is 134/87. Patient reports that he has lost about 4 lbs since last visit.    Eczema:  Patient reports only mild improvements of his eczema on both his forearm. He recently reports profound pruritis and bleeding. He uses a topical cream which provide some relieve.     Colon cancer screening:  The patient would like a colonoscopy or possibly look into a cologuardtest.     Problem list and histories reviewed & adjusted, as indicated.  Additional history: as documented    Patient Active Problem List   Diagnosis     Hypertension goal BP (blood pressure) < 140/90     Irritant contact dermatitis, unspecified trigger     Eczema, unspecified type     Family history of colon cancer     Anxiety     Class 1 obesity due to excess calories without serious comorbidity with body mass index (BMI) of 33.0 to 33.9 in adult     CARDIOVASCULAR SCREENING; LDL GOAL LESS THAN 130     Obesity (BMI 35.0-39.9) with comorbidity (H)     History reviewed. No pertinent surgical history.    Social History     Tobacco Use     Smoking status: Never Smoker     Smokeless tobacco: Never Used   Substance Use Topics     Alcohol use: Yes     Family History   Problem Relation Age of Onset     Hypertension Mother      Ovarian Cancer Mother      Diabetes Father      Hypertension Father      Cancer Father      Cervical Cancer Sister      Anxiety Disorder Sister      Cerebrovascular Disease  "Sister      Heart Disease Sister      Anxiety Disorder Sister          Current Outpatient Medications   Medication Sig Dispense Refill     amLODIPine (NORVASC) 5 MG tablet Take 1 tablet (5 mg) by mouth daily 90 tablet 3     lisinopril (PRINIVIL/ZESTRIL) 20 MG tablet Take 1 tablet (20 mg) by mouth 2 times daily 180 tablet 3     triamcinolone (KENALOG) 0.1 % external cream Apply sparingly to affected area tid prn. 453 g 3     Allergies   Allergen Reactions     Penicillins Unknown     Recent Labs   Lab Test 10/18/18  1728 08/23/18  1607 05/21/18  1546   A1C 6.1* 5.8*  --    LDL  --  124*  --    HDL  --  54  --    TRIG  --  177*  --    CR  --   --  0.82   GFRESTIMATED  --   --  >90   GFRESTBLACK  --   --  >90   POTASSIUM  --   --  4.3      BP Readings from Last 3 Encounters:   01/16/19 134/87   10/18/18 150/85   08/23/18 142/84    Wt Readings from Last 3 Encounters:   01/16/19 239 lb 6.4 oz (108.6 kg)   10/18/18 242 lb 9.6 oz (110 kg)   08/23/18 233 lb (105.7 kg)                  Labs reviewed in EPIC    Reviewed and updated as needed this visit by clinical staff  Tobacco  Allergies  Meds  Med Hx  Surg Hx  Fam Hx  Soc Hx      Reviewed and updated as needed this visit by Provider         ROS:  Constitutional, HEENT, cardiovascular, pulmonary, gi and gu systems are negative, except as otherwise noted.    This document serves as a record of the services and decisions personally performed by NAEEM PAL. It was created on his behalf by Tan Ge trained medical scribes. The creation of this document is based on the provider's statements to the medical scribe. Tan Ge, January 16, 2019 3:18 PM      OBJECTIVE:     /87   Pulse 72   Temp 98.1  F (36.7  C) (Oral)   Ht 5' 9.7\" (1.77 m)   Wt 239 lb 6.4 oz (108.6 kg)   BMI 34.65 kg/m    Body mass index is 34.65 kg/m .  GENERAL: healthy, alert and no distress  Neck: normal  RESP: lungs clear to auscultation - no rales, rhonchi or wheezes  CV: regular " rate and rhythm, normal S1 S2, no S3 or S4, no murmur, click or rub, no peripheral edema and peripheral pulses strong  Abdomen: no HSM, tenderness,   MS: no gross musculoskeletal defects noted, no edema  SKIN: profound eczema on both forearm at the extensor surfaces.  NEURO: Normal strength and tone, mentation intact and speech normal  PSYCH: mentation appears normal, affect normal/bright    Diagnostic Test Results:  No results found for this or any previous visit (from the past 24 hour(s)).    ASSESSMENT/PLAN:     (I10) Hypertension goal BP (blood pressure) < 140/90  Comment: Blood pressure recheck was 134/87  Plan: amLODIPine (NORVASC) 5 MG tablet, lisinopril         (PRINIVIL/ZESTRIL) 20 MG tablet        Continue current medication    (L24.9) Irritant contact dermatitis, unspecified trigger  Comment: Patient reports mild improvement of skin irritation  Plan: triamcinolone (KENALOG) 0.1 % external cream        Continue Triamcinolone, Recommended a new steroid cream if no improvements.    (Z12.11) Screen for colon cancer  (primary encounter diagnosis)  Comment: Elect to get a colonoscopy  Plan: GASTROENTEROLOGY ADULT REF PROCEDURE ONLY        Follow up pending results      (R73.9) Elevated blood sugar  Comment: patient has a diet and exercise program  Plan: Hemoglobin A1c        Follow up pending result        Patient Instructions   [place] [facility]:  [phone] for colonoscopy        The information in this document, created by the medical scribjose Ge, accurately reflects the services I personally performed and the decisions made by me. I have reviewed and approved this document for accuracy prior to leaving the patient care area.       Kristopher Cook MD, MD  [facility]

## 2019-01-18 ENCOUNTER — TELEPHONE (OUTPATIENT)
Dept: FAMILY MEDICINE | Facility: CLINIC | Age: 59
End: 2019-01-18

## 2019-01-18 NOTE — TELEPHONE ENCOUNTER
Called and left message. A1c has risent. He has appt 1-24 and recommended bringing in list of the foods he eats. I will call again.    Umesh Cook MD

## 2019-06-10 ENCOUNTER — TELEPHONE (OUTPATIENT)
Dept: FAMILY MEDICINE | Facility: CLINIC | Age: 59
End: 2019-06-10

## 2019-06-10 DIAGNOSIS — L30.9 ECZEMA, UNSPECIFIED TYPE: Primary | ICD-10-CM

## 2019-06-10 DIAGNOSIS — I10 HYPERTENSION GOAL BP (BLOOD PRESSURE) < 140/90: ICD-10-CM

## 2019-06-10 NOTE — TELEPHONE ENCOUNTER
Reason for Call:  Other prescription    Detailed comments: Patient was prescribed amlodipine 5 mg once daily at that time his bp was 130/75 and it has seemed to increase to 140/85 and would like to know if he should increase his dosage to twice daily. Also he has eczema and a friend is using klub-form for his eczema and recommended patient to try this. Can prescriptions be sent to [facility]       Phone Number Patient can be reached at: Home number on file [phone] (home)    Best Time:     Can we leave a detailed message on this number? YES    Call taken on 6/10/2019 at 10:44 AM by Kandy Gaffney

## 2019-06-10 NOTE — TELEPHONE ENCOUNTER
Patient/family was instructed to return call to [facility] RN directly on the RN Call back line at [phone].     Tim Cope RN

## 2019-06-11 ENCOUNTER — TELEPHONE (OUTPATIENT)
Dept: FAMILY MEDICINE | Facility: CLINIC | Age: 59
End: 2019-06-11

## 2019-06-11 DIAGNOSIS — L30.9 ECZEMA, UNSPECIFIED TYPE: Primary | ICD-10-CM

## 2019-06-11 RX ORDER — CLOBETASOL PROPIONATE 0.5 MG/G
1 AEROSOL, FOAM TOPICAL DAILY
Qty: 100 G | Refills: 1 | Status: SHIPPED | OUTPATIENT
Start: 2019-06-11 | End: 2019-07-03

## 2019-06-11 RX ORDER — AMLODIPINE BESYLATE 10 MG/1
10 TABLET ORAL DAILY
Qty: 90 TABLET | Refills: 3 | Status: SHIPPED | OUTPATIENT
Start: 2019-06-11 | End: 2019-07-03

## 2019-06-11 NOTE — TELEPHONE ENCOUNTER
Per pharmacy,  Olux foam is not covered and will cost patient $569.00.  Insurance will cover Olux gel or solution.    Left message on patient's VM asking his preference.  He had told me earlier today that I could leave a detailed message on his VM.    Tim Cope RN

## 2019-06-11 NOTE — TELEPHONE ENCOUNTER
Patient states his blood pressure has been borderline high 140/85, and he had discussed increasing amlodipine to 10 mg with PCP at last appointment.  He is requesting an RX to be sent to preferred pharmacy.    He also is requesting to try Clobetasol Propinate foam for his eczema on his forearms.      Has upcoming appointment on 7/3.    Tim Cope RN

## 2019-06-11 NOTE — TELEPHONE ENCOUNTER
Reason for Call:  Other prescription    Detailed comments: Olux was prescribed but the foam is not covered by insurance,gel and solution is cover pharmacy would like to discus other options.     Phone Number Patient can be reached at: Other phone number:  [phone]    Best Time: 8am - 6pm     Can we leave a detailed message on this number? YES    Call taken on 6/11/2019 at 2:26 PM by Kathy Morales

## 2019-06-11 NOTE — TELEPHONE ENCOUNTER
Patient/family was instructed to return call to [facility] RN directly on the RN Call back line at [phone].     Tim Cope RN

## 2019-06-12 RX ORDER — CLOBETASOL PROPIONATE 0.5 MG/G
1 OINTMENT TOPICAL DAILY
Qty: 60 G | Refills: 1 | Status: SHIPPED | OUTPATIENT
Start: 2019-06-12 | End: 2019-07-03

## 2019-06-12 NOTE — TELEPHONE ENCOUNTER
Route to PCP for alternative to Olux foam.  Please see below.  Pharmacy states either gel or solution would be covered by insurance.  I do not see a gel available, but there is an ointment.  New order pended for review and approval.     Patient returns call to RN VM. He states he is fine with whatever PCP orders or thinks is best. Can send to same pharmacy on [address] Gloria Leal RN

## 2019-06-12 NOTE — TELEPHONE ENCOUNTER
Huddled with Dr. Cook regarding the message below.  He sent pended orders for amlodipine and Olux foam, as patient requested.  Since patient's A1C has been elevated, Dr. Cook had this RN add DM to appointment note on 7/3, as well.      Tim Cope RN

## 2019-06-12 NOTE — TELEPHONE ENCOUNTER
Patient/family was instructed to return call to [facility] RN directly on the RN Call back line at [phone].     Tim Cope RN

## 2019-07-03 ENCOUNTER — OFFICE VISIT (OUTPATIENT)
Dept: FAMILY MEDICINE | Facility: CLINIC | Age: 59
End: 2019-07-03
Payer: COMMERCIAL

## 2019-07-03 VITALS
TEMPERATURE: 97.6 F | WEIGHT: 237.8 LBS | SYSTOLIC BLOOD PRESSURE: 137 MMHG | BODY MASS INDEX: 34.04 KG/M2 | HEIGHT: 70 IN | HEART RATE: 86 BPM | RESPIRATION RATE: 21 BRPM | DIASTOLIC BLOOD PRESSURE: 80 MMHG | OXYGEN SATURATION: 95 %

## 2019-07-03 DIAGNOSIS — E11.9 TYPE 2 DIABETES MELLITUS WITHOUT COMPLICATION, WITHOUT LONG-TERM CURRENT USE OF INSULIN (H): Primary | ICD-10-CM

## 2019-07-03 DIAGNOSIS — I10 HYPERTENSION GOAL BP (BLOOD PRESSURE) < 140/90: ICD-10-CM

## 2019-07-03 DIAGNOSIS — L30.9 ECZEMA, UNSPECIFIED TYPE: ICD-10-CM

## 2019-07-03 PROBLEM — E66.09 CLASS 1 OBESITY DUE TO EXCESS CALORIES WITHOUT SERIOUS COMORBIDITY WITH BODY MASS INDEX (BMI) OF 33.0 TO 33.9 IN ADULT: Status: RESOLVED | Noted: 2018-07-23 | Resolved: 2019-07-03

## 2019-07-03 PROBLEM — E66.811 CLASS 1 OBESITY DUE TO EXCESS CALORIES WITHOUT SERIOUS COMORBIDITY WITH BODY MASS INDEX (BMI) OF 33.0 TO 33.9 IN ADULT: Status: RESOLVED | Noted: 2018-07-23 | Resolved: 2019-07-03

## 2019-07-03 LAB — HBA1C MFR BLD: 6.3 % (ref 0–5.6)

## 2019-07-03 PROCEDURE — 83036 HEMOGLOBIN GLYCOSYLATED A1C: CPT | Performed by: FAMILY MEDICINE

## 2019-07-03 PROCEDURE — 84443 ASSAY THYROID STIM HORMONE: CPT | Performed by: FAMILY MEDICINE

## 2019-07-03 PROCEDURE — 82043 UR ALBUMIN QUANTITATIVE: CPT | Performed by: FAMILY MEDICINE

## 2019-07-03 PROCEDURE — 80048 BASIC METABOLIC PNL TOTAL CA: CPT | Performed by: FAMILY MEDICINE

## 2019-07-03 PROCEDURE — 36415 COLL VENOUS BLD VENIPUNCTURE: CPT | Performed by: FAMILY MEDICINE

## 2019-07-03 PROCEDURE — 99214 OFFICE O/P EST MOD 30 MIN: CPT | Performed by: FAMILY MEDICINE

## 2019-07-03 RX ORDER — CLOBETASOL PROPIONATE 0.5 MG/G
1 OINTMENT TOPICAL DAILY
Qty: 60 G | Refills: 6 | Status: SHIPPED | OUTPATIENT
Start: 2019-07-03 | End: 2020-11-18

## 2019-07-03 RX ORDER — AMLODIPINE BESYLATE 5 MG/1
5 TABLET ORAL 2 TIMES DAILY
Qty: 180 TABLET | Refills: 3 | Status: SHIPPED | OUTPATIENT
Start: 2019-07-03 | End: 2019-12-31

## 2019-07-03 ASSESSMENT — MIFFLIN-ST. JEOR: SCORE: 1895.14

## 2019-07-03 ASSESSMENT — PAIN SCALES - GENERAL: PAINLEVEL: NO PAIN (0)

## 2019-07-03 NOTE — PATIENT INSTRUCTIONS
Continue to monitor blood pressure.     Continue with healthy diet and exercise.       Orders Placed This Encounter     Albumin Random Urine Quantitative with Creat Ratio     This test includes a Creatinine Ratio.  Do not order [id number] (Creatinine Random Urine)  Last Lab Result: No results found for: UMALSP, UMALCR, UCRR     Order Specific Question:   Includes     Answer:   with Creat Ratio     TSH WITH FREE T4 REFLEX     Last Lab Result: No results found for: TSH     HEMOGLOBIN A1C     Last Lab Result: Hemoglobin A1C (%)       Date                     Value                 01/16/2019               6.5 (H)          ----------     BASIC METABOLIC PANEL     amLODIPine (NORVASC) 5 MG tablet     Sig: Take 1 tablet (5 mg) by mouth 2 times daily     Dispense:  180 tablet     Refill:  3     clobetasol (TEMOVATE) 0.05 % external ointment     Sig: Apply 1 g topically daily Thin layer     Dispense:  60 g     Refill:  6

## 2019-07-03 NOTE — PROGRESS NOTES
Subjective     Abhi Pabon is a 59 year old male who presents to clinic today for the following health issues:    HPI     Pre-Diabetes Follow-up      How often are you checking your blood sugar? Not at all, told pre-diabetic     What time of day are you checking your blood sugars (select all that apply)?  N/At    Have you had any blood sugars above 200?  No    Have you had any blood sugars below 70?  No    What symptoms do you notice when your blood sugar is low?  None    What concerns do you have today about your diabetes? None     Do you have any of these symptoms? (Select all that apply)  No symptoms      Have you had a diabetic eye exam in the last 12 months? No    BP Readings from Last 2 Encounters:   07/03/19 137/80   01/16/19 134/87     Hemoglobin A1C (%)   Date Value   07/03/2019 6.3 (H)   01/16/2019 6.5 (H)     LDL Cholesterol Calculated (mg/dL)   Date Value   08/23/2018 124 (H)       Diabetes Management Resources  Hypertension Follow-up      Do you check your blood pressure regularly outside of the clinic? Yes  140's over 80's     Are you following a low salt diet? Yes    Are your blood pressures ever more than 140 on the top number (systolic) OR more   than 90 on the bottom number (diastolic), for example 140/90? No       Amount of exercise or physical activity: walking a lot at job     Problems taking medications regularly: No    Medication side effects: none    Diet: low salt and carbohydrate counting    Eczema   Patient presenting to the clinic with an allergic reaction that occurred after his job switched uniforms. He suspects that the new uniform has trace amounts of formaldehyde and other dyes that caused the irritation. As soon as he stopped wearing the uniform, his eczema improved. Patient is requesting documentation to present to his place of work.     BP Readings from Last 3 Encounters:   07/03/19 137/80   01/16/19 134/87   10/18/18 150/85    Wt Readings from Last 3 Encounters:  "  07/03/19 107.9 kg (237 lb 12.8 oz)   01/16/19 108.6 kg (239 lb 6.4 oz)   10/18/18 110 kg (242 lb 9.6 oz)            Reviewed and updated as needed this visit by Provider  This document serves as a record of the services and decisions personally performed and made by Kristopher Cook MD. It was created on his behalf by Sharee Borrego, a trained medical scribe. The creation of this document is based the provider's statements to the medical scribe.  Sharee Borrego, July 3, 2019 3:06 PM            Review of Systems   ROS COMP: Constitutional, HEENT, cardiovascular, pulmonary, gi and gu systems are negative, except as otherwise noted.      Objective    /80   Pulse 86   Temp 97.6  F (36.4  C) (Oral)   Resp 21   Ht 1.77 m (5' 9.7\")   Wt 107.9 kg (237 lb 12.8 oz)   SpO2 95%   BMI 34.42 kg/m    Body mass index is 34.42 kg/m .  Physical Exam   GENERAL: alert, no distress and over weight  NECK: no adenopathy, no asymmetry, masses, or scars and thyroid normal to palpation  RESP: lungs clear to auscultation - no rales, rhonchi or wheezes  CV: regular rate and rhythm, normal S1 S2, no S3 or S4, no murmur, click or rub, no peripheral edema and peripheral pulses strong  ABDOMEN: soft, nontender, no hepatosplenomegaly, no masses and bowel sounds normal  MS: no gross musculoskeletal defects noted, no edema  SKIN: large patches on forearms and legs, consistent with previous eczema  PSYCH: mentation appears normal, affect normal/bright    Diagnostic Test Results:  Labs reviewed in Epic  Results for orders placed or performed in visit on 07/03/19 (from the past 24 hour(s))   HEMOGLOBIN A1C   Result Value Ref Range    Hemoglobin A1C 6.3 (H) 0 - 5.6 %           Assessment & Plan     (E11.9) Type 2 diabetes mellitus without complication, without long-term current use of insulin (H)  (primary encounter diagnosis)  Comment: Controlled with diet  Plan: Albumin Random Urine Quantitative with Creat         Ratio, TSH WITH FREE " "T4 REFLEX, HEMOGLOBIN A1C,        BASIC METABOLIC PANEL        Recommended more aggressive treatment with metformin, but patient declines. Will continue to monitor      (I10) Hypertension goal BP (blood pressure) < 140/90  Comment: High normal   Plan: BASIC METABOLIC PANEL, amLODIPine (NORVASC) 5         MG tablet        Continue will continue with current medications, and monitor blood pressures    (L30.9) Eczema, unspecified type  Comment: Worsened due to contact with new work uniform. Patient given a doctors note to present to his work place.   Plan: clobetasol (TEMOVATE) 0.05 % external ointment           BMI:   Estimated body mass index is 34.42 kg/m  as calculated from the following:    Height as of this encounter: 1.77 m (5' 9.7\").    Weight as of this encounter: 107.9 kg (237 lb 12.8 oz).   Weight management plan: Discussed healthy diet and exercise guidelines        Patient Instructions       Continue to monitor blood pressure.     Continue with healthy diet and exercise.       Orders Placed This Encounter     Albumin Random Urine Quantitative with Creat Ratio     This test includes a Creatinine Ratio.  Do not order [id number] (Creatinine Random Urine)  Last Lab Result: No results found for: UMALSP, UMALCR, UCRR     Order Specific Question:   Includes     Answer:   with Creat Ratio     TSH WITH FREE T4 REFLEX     Last Lab Result: No results found for: TSH     HEMOGLOBIN A1C     Last Lab Result: Hemoglobin A1C (%)       Date                     Value                 01/16/2019               6.5 (H)          ----------     BASIC METABOLIC PANEL     amLODIPine (NORVASC) 5 MG tablet     Sig: Take 1 tablet (5 mg) by mouth 2 times daily     Dispense:  180 tablet     Refill:  3     clobetasol (TEMOVATE) 0.05 % external ointment     Sig: Apply 1 g topically daily Thin layer     Dispense:  60 g     Refill:  6           Return in about 4 months (around 11/3/2019) for Diabetes Follow-up.    The information in this " document, created by the medical scribe for me, accurately reflects the services I personally performed and the decisions made by me. I have reviewed and approved this document for accuracy.     Kristopher Cook MD, MD  [facility]

## 2019-07-03 NOTE — LETTER
July 5, 2019      Abhi ADAM Cm  [address] [place] [address]            Dear Mr. Pabon     The results of your recent lab tests show  protein in the urine. This helps guide us in how aggressive we need to be to control your diabetes. Control of blood pressure, diabetes and cholesterol are  very important in protecting the kidneys. The lisinopril medication helps to protect the kidneys and control the blood pressure.Enclosed is a copy of these results.      Results for orders placed or performed in visit on 07/03/19   Albumin Random Urine Quantitative with Creat Ratio   Result Value Ref Range    Creatinine Urine 35 mg/dL    Albumin Urine mg/L 55 mg/L    Albumin Urine mg/g Cr 160.12 (H) 0 - 17 mg/g Cr   TSH WITH FREE T4 REFLEX   Result Value Ref Range    TSH 2.56 0.40 - 4.00 mU/L   HEMOGLOBIN A1C   Result Value Ref Range    Hemoglobin A1C 6.3 (H) 0 - 5.6 %   BASIC METABOLIC PANEL   Result Value Ref Range    Sodium 134 133 - 144 mmol/L    Potassium 3.9 3.4 - 5.3 mmol/L    Chloride 104 94 - 109 mmol/L    Carbon Dioxide 24 20 - 32 mmol/L    Anion Gap 6 3 - 14 mmol/L    Glucose 111 (H) 70 - 99 mg/dL    Urea Nitrogen 6 (L) 7 - 30 mg/dL    Creatinine 0.81 0.66 - 1.25 mg/dL    GFR Estimate >90 >60 mL/min/[1.73_m2]    GFR Estimate If Black >90 >60 mL/min/[1.73_m2]    Calcium 8.8 8.5 - 10.1 mg/dL               We will review at your next appointment.         Sincerely,       Kristopher Cook MD/wen

## 2019-07-03 NOTE — LETTER
[address] [id number] [phone]          July 3, 2019      RE: Abhi Pabon                                                                       To Whom it May Concern:      Abhi Pabon is a patient of mine who has significant Eczema. We have recommended only cotton clothing. He has had a reaction to clothing with formaldehyde and other dyes.  please allow him to wear  clothing he knows is safe for him to wear.              Sincerely,    Kristopher Cook MD

## 2019-07-04 LAB
ANION GAP SERPL CALCULATED.3IONS-SCNC: 6 MMOL/L (ref 3–14)
BUN SERPL-MCNC: 6 MG/DL (ref 7–30)
CALCIUM SERPL-MCNC: 8.8 MG/DL (ref 8.5–10.1)
CHLORIDE SERPL-SCNC: 104 MMOL/L (ref 94–109)
CO2 SERPL-SCNC: 24 MMOL/L (ref 20–32)
CREAT SERPL-MCNC: 0.81 MG/DL (ref 0.66–1.25)
CREAT UR-MCNC: 35 MG/DL
GFR SERPL CREATININE-BSD FRML MDRD: >90 ML/MIN/{1.73_M2}
GLUCOSE SERPL-MCNC: 111 MG/DL (ref 70–99)
MICROALBUMIN UR-MCNC: 55 MG/L
MICROALBUMIN/CREAT UR: 160.12 MG/G CR (ref 0–17)
POTASSIUM SERPL-SCNC: 3.9 MMOL/L (ref 3.4–5.3)
SODIUM SERPL-SCNC: 134 MMOL/L (ref 133–144)
TSH SERPL DL<=0.005 MIU/L-ACNC: 2.56 MU/L (ref 0.4–4)

## 2019-07-09 ENCOUNTER — TELEPHONE (OUTPATIENT)
Dept: FAMILY MEDICINE | Facility: CLINIC | Age: 59
End: 2019-07-09

## 2019-07-09 DIAGNOSIS — F41.9 ANXIETY: Primary | ICD-10-CM

## 2019-07-09 DIAGNOSIS — F42.2 MIXED OBSESSIONAL THOUGHTS AND ACTS: ICD-10-CM

## 2019-07-09 NOTE — TELEPHONE ENCOUNTER
Reason for Call:  Other / Mental health    Detailed comments: Patient's sister, Samira, called and stated she would like to speak to Dr Cook or someone in his care team to discuss how she would follow up to get mental health care for patient and family.  Samira also requested to call her back only for she is the one making all these arrangements and patient is at work and he would probably be disrupted and concerned.      Phone Number Patient can be reached at: [phone] (Samira/patient's sister)    Best Time: Anytime    Can we leave a detailed message on this number? YES    Call taken on 7/9/2019 at 1:14 PM by Charlene Ovalles

## 2019-07-09 NOTE — TELEPHONE ENCOUNTER
Patient/family was instructed to return call to [facility] RN directly on the RN Call back line at [phone].     Consent to communicate with sister Samira, on file.    Tim Cope RN

## 2019-07-11 NOTE — TELEPHONE ENCOUNTER
We can make a referral to our mental health and psychiatry department. I saw Abhi Pabon recently and not sure if he will be receptive to the referral. Does his sister think he will be willing to go.

## 2019-07-11 NOTE — TELEPHONE ENCOUNTER
Patient's sister, Samira, who has consent to communicate, is requesting Dr. Cook's recommendations on options for mental health therapy.      Samira reports patient has a history of OCD, anxiety, repetitive thinking, and paranoia.  These symptoms have worsened since his mother  in 2015.  She also believes he is on the autism spectrum.     The family has paid for a private therapist in the past; however, Samira is wondering what options are in-network.     To provider to advise.  Do you want to see patient first?    Then RN to leave a detailed message on Samira's cell phone.  Samira will then review options with patient.      Tim Cope RN

## 2019-07-11 NOTE — TELEPHONE ENCOUNTER
Patient/family was instructed to return call to [facility] RN directly on the RN Call back line at [phone].     Tim Cope RN

## 2019-07-11 NOTE — TELEPHONE ENCOUNTER
Dr. Cook reports DM is improving from last appointment, and he is continuing to work on patient's obesity.      Sister, Samira, verbalized below message and above update from Dr. Cook.  She would like Dr. Cook to place the referral and to be seen at the [facility] if at all possible.  She does not want the patient to be contacted at this time, because she wants to discuss with him first.      Samira expressed much appreciation.     Tim Cope RN

## 2019-07-11 NOTE — TELEPHONE ENCOUNTER
Sister, Samira, agreed for patient to be seen at [facility] for psychiatry and medication management.      Referral pended for Dr. Cook to review and sign.    Tim Cope RN

## 2019-09-25 ENCOUNTER — OFFICE VISIT (OUTPATIENT)
Dept: FAMILY MEDICINE | Facility: CLINIC | Age: 59
End: 2019-09-25
Payer: COMMERCIAL

## 2019-09-25 VITALS
HEART RATE: 79 BPM | SYSTOLIC BLOOD PRESSURE: 138 MMHG | HEIGHT: 69 IN | DIASTOLIC BLOOD PRESSURE: 86 MMHG | WEIGHT: 243.3 LBS | BODY MASS INDEX: 36.04 KG/M2 | OXYGEN SATURATION: 98 % | TEMPERATURE: 98.2 F

## 2019-09-25 DIAGNOSIS — L24.9 IRRITANT CONTACT DERMATITIS, UNSPECIFIED TRIGGER: ICD-10-CM

## 2019-09-25 DIAGNOSIS — L30.9 ECZEMA, UNSPECIFIED TYPE: Primary | ICD-10-CM

## 2019-09-25 DIAGNOSIS — E11.9 TYPE 2 DIABETES MELLITUS WITHOUT COMPLICATION, WITHOUT LONG-TERM CURRENT USE OF INSULIN (H): ICD-10-CM

## 2019-09-25 DIAGNOSIS — L08.9 STAPH SKIN INFECTION: ICD-10-CM

## 2019-09-25 DIAGNOSIS — I10 HYPERTENSION GOAL BP (BLOOD PRESSURE) < 140/90: ICD-10-CM

## 2019-09-25 DIAGNOSIS — B95.8 STAPH SKIN INFECTION: ICD-10-CM

## 2019-09-25 PROCEDURE — 99214 OFFICE O/P EST MOD 30 MIN: CPT | Performed by: NURSE PRACTITIONER

## 2019-09-25 ASSESSMENT — MIFFLIN-ST. JEOR: SCORE: 1908.98

## 2019-09-25 NOTE — PATIENT INSTRUCTIONS
See dermatologist as soon as possible  It does not appear infected at this time so no place for oral antibiotics and the skin is grown over so topical will not help either  I would avoid bowling until you have a plan with derm    If the skin gets deep red, swollen or very painful to touch OR you have fever, chills, red streak, then I would be concerned for infection and you be seen right away

## 2019-09-25 NOTE — PROGRESS NOTES
"Subjective     Abhi Pabon is a 59 year old male who presents to clinic today for the following health issues:    HPI   Finger infection     Onset: 3 weeks     Description:   Location: right hand ring finger   Character: dried out sensation, and itchiness     Intensity: moderate    Progression of Symptoms: worse    Accompanying Signs & Symptoms:  Other symptoms: swelling and redness    History:   Previous similar pain: no       Precipitating factors:   Trauma or overuse: YES- might be from eczema     Alleviating factors:  Improved by: nothing    Therapies Tried and outcome: OTC medication, steroid cream made it worse and itchy so has not been using it.     Patient works as a  for [employer].  Their uniforms were recently changed and come from [place] and are impregnated with Agent Orange.  He, along with other employees, have developed new eczema/irritant dermatitis.  He was seen at [facility] dermatology where he received the eczema and irritant dermatitis diagnosis.  He was advised a plan of care which was too expensive so he did not follow through and is looking into if the care will be covered by work comp.  In the meantime, he developed this change to the inflammation on his right fourth finger three weeks ago and he is concerned for infection.  It started to crack and ooze and is slightly tender.  Two weeks ago he restarted bowling.  He has his own three balls which he exclusively uses.  He is right handed and the hole of the ball rubs against the affected finger which has worsened the wound.  But he notes that the wound started before he restarted bowling.    Taking \"candibacin BR\" as prescribed by his \"natural doctor\" to help with eczema to flush out the sugar in his body.  He feels it has been helping.  He does not take metformin and notes he was even hesitant to see his PCP last because he knew his A1C would be up and he would be advised medication which he strongly prefers to avoid.    Lab " "Results   Component Value Date    A1C 6.3 07/03/2019    A1C 6.5 01/16/2019    A1C 6.1 10/18/2018    A1C 5.8 08/23/2018       Reviewed and updated as needed this visit by Provider         Review of Systems   ROS COMP:   ROS:5 point ROS including CONST, HEENT, Respiratory, CV, and GI other than that noted in the HPI,  is negative         Objective    /86   Pulse 79   Temp 98.2  F (36.8  C) (Oral)   Ht 1.753 m (5' 9\")   Wt 110.4 kg (243 lb 4.8 oz)   SpO2 98%   BMI 35.93 kg/m    Body mass index is 35.93 kg/m .  Physical Exam   GENERAL: healthy, alert and no distress  NECK: no adenopathy, no asymmetry, masses, or scars and thyroid normal to palpation  RESP: lungs clear to auscultation - no rales, rhonchi or wheezes  CV: regular rate and rhythm, normal S1 S2, no S3 or S4, no murmur, click or rub, no peripheral edema and peripheral pulses strong  SKIN: large pink patches with loss of surface contour and scattered overlying red scaly patches on extensor surface of both elbows, the neck in collar distribution has deep red scaly patches  RIGHT FOURTH FINGER (image below) - chronically dry blistery rash covering medial half of the dorsal and palmar surface of the finger with dry, scaly leading edge, denuded center with some granulation tissue and deep red fissures with wet, honey colored appearance, but no moisture on touching with gauze    Diagnostic Test Results:  Labs reviewed in Epic  pending        Assessment & Plan     (L08.9,  B95.8) Staph skin infection  Comment:   Plan: mupirocin (BACTROBAN) 2 % external ointment,         Wound Culture Aerobic Bacterial GICH (FUTURE),         WOUND CARE REFERRAL    High potential for infection with eroded skin and chronicity of the inflammatory condition.  No surrounding tissue redness, swelling, warmth or tenderness no systemic symptoms of fever to first go to oral antibiotics.  I'd like to get a culture and start topical antibiotics while awaiting the results.  It is " unclear how derm will be involved due to work comp involvement, so I have also referred to wound care because this needs more immediate and acute attention in addition to overall plan for eczema/irritant dermatitis.    Addendum 10/3/19 - see TE - triage unable to reach patient and has talked to emergency contact.  I've now ordered oral antibiotics and asked for patient to come into the clinic for the wound culture ASAP.    (L30.9) Eczema, unspecified type  (primary encounter diagnosis)  Comment:   Plan: DERMATOLOGY REFERRAL, SKIN CARE REFERRAL      Reviewed Natural Medicine Comprehensive Database with the patient.  Does not appear to cause further skin problems or infection, but notable for multiple ingredients that can interact with antihypertensives and, in particular, amlodipine.      (L24.9) Irritant contact dermatitis, unspecified trigger  Comment:   Plan: DERMATOLOGY REFERRAL, SKIN CARE REFERRAL, Wound        Culture Aerobic Bacterial GICH (FUTURE), WOUND         CARE REFERRAL    (I10) Hypertension goal BP (blood pressure) < 140/90  Comment:   Plan: Advise against the natural supplement or at least discuss with his PCP.  Blood pressure is under 140/90, but not ideal.  The database doesn't specify how the supplement interacts with antihypertensives, but should be considered given borderline control    (E11.9) Type 2 diabetes mellitus without complication, without long-term current use of insulin (H)  Comment:   Plan: Wound Culture Aerobic Bacterial GICH (FUTURE),         WOUND CARE REFERRAL   Diabetes well controlled.          Patient Instructions   See dermatologist as soon as possible  It does not appear infected at this time so no place for oral antibiotics and the skin is grown over so topical will not help either  I would avoid bowling until you have a plan with derm    If the skin gets deep red, swollen or very painful to touch OR you have fever, chills, red streak, then I would be concerned for infection  and you be seen right away      Return in about 2 weeks (around 10/9/2019) for dermatology within 2 weeks.    VEGA Mahmood [facility]

## 2019-09-26 ENCOUNTER — TELEPHONE (OUTPATIENT)
Dept: FAMILY MEDICINE | Facility: CLINIC | Age: 59
End: 2019-09-26

## 2019-09-26 DIAGNOSIS — B95.8 STAPH SKIN INFECTION: Primary | ICD-10-CM

## 2019-09-26 DIAGNOSIS — L08.9 STAPH SKIN INFECTION: Primary | ICD-10-CM

## 2019-09-26 RX ORDER — MUPIROCIN 20 MG/G
OINTMENT TOPICAL 3 TIMES DAILY
Qty: 30 G | Refills: 0 | Status: SHIPPED | OUTPATIENT
Start: 2019-09-26 | End: 2021-01-26

## 2019-09-26 NOTE — TELEPHONE ENCOUNTER
Please call the patient.  On thinking about his finger since yesterday I would actually like to try to get a wound culture.  Please have him return today - this will not be a new visit.  He can be on the MA schedule at anytime I am here and I will collect the wound culture.  I've also ordered a topical antibiotic that I want him to start AFTER we collect the culture and before we get the culture results.  I also referred him to wound care to pinch hit this particular area before the derm plan for overall care of his eczema is planned.  SARAI Suarez, ALISIA

## 2019-10-02 ENCOUNTER — TELEPHONE (OUTPATIENT)
Dept: FAMILY MEDICINE | Facility: CLINIC | Age: 59
End: 2019-10-02

## 2019-10-02 NOTE — TELEPHONE ENCOUNTER
Panel Management Review      Patient has the following on his problem list:     Diabetes    ASA: Not Required     Last A1C  Lab Results   Component Value Date    A1C 6.3 07/03/2019    A1C 6.5 01/16/2019    A1C 6.1 10/18/2018    A1C 5.8 08/23/2018     A1C tested: MONITOR    Last LDL:    Lab Results   Component Value Date    CHOL 213 08/23/2018     Lab Results   Component Value Date    HDL 54 08/23/2018     Lab Results   Component Value Date     08/23/2018     Lab Results   Component Value Date    TRIG 177 08/23/2018     No results found for: CHOLHDLRATIO  Lab Results   Component Value Date    NHDL 159 08/23/2018       Is the patient on a Statin? NO             Is the patient on Aspirin? NO        Last three blood pressure readings:  BP Readings from Last 3 Encounters:   09/25/19 138/86   07/03/19 137/80   01/16/19 134/87       Date of last diabetes office visit: 7/3/19     Tobacco History:     History   Smoking Status     Never Smoker   Smokeless Tobacco     Never Used           Composite cancer screening  Chart review shows that this patient is due/due soon for the following Colonoscopy  Summary:    Patient is due/failing the following:   A1C, COLONOSCOPY and PHYSICAL    Action needed:   Patient needs office visit for Annual Physical and Diabetes check, Colonoscopy.    Type of outreach:     None needed- appt already scheduled with PCP    Questions for provider review:    None                                                                                                                                      Nadine Sheffield MA       Chart routed to  .

## 2019-10-03 RX ORDER — SULFAMETHOXAZOLE/TRIMETHOPRIM 800-160 MG
1 TABLET ORAL 2 TIMES DAILY
Qty: 20 TABLET | Refills: 0 | Status: SHIPPED | OUTPATIENT
Start: 2019-10-03 | End: 2019-12-31

## 2019-10-03 NOTE — TELEPHONE ENCOUNTER
In absence of contact with patient I am prescribing oral antibiotics to cover for staph and strep.  If patient is able to come in to the clinic before he starts the antibiotic this is greatly preferable.  Any provider can swab it.  The order is associated with his OV and there is also a future order that can be released.  ALISIA Palmer

## 2019-10-03 NOTE — TELEPHONE ENCOUNTER
Called patient. LVM to call clinic. Called patient's sister, Samira. She was just about to go into a meeting. Requested that she give us a call back when she has a moment to talk.

## 2019-10-03 NOTE — TELEPHONE ENCOUNTER
Kyra,     Spoke with patient. He states that he saw [facility] dermatology today and they told patient that his finger was not infected. He states that it continues to hurt, and they told him that it will until healed. He is starting light therapy with them, and they want pt to start prescribed topical cream (clobetasol? Not sure pt was not clear about name of prescription). They also told pt to keep it moist, use saran wrap. Pt states that he is not going to do that, he will use bandage.    -Current symptoms: pain and some clear drainage/weeping. Finger about the same as it was since he was last in the clinic.     Pt expressed frustration. He stated that now he is worried about infection. He is wondering if he needs to do wound culture. Pt then states that Clar gave him a large jar of cream for the wound, that apparently cost much more from the pharmacy $30 for 4 day supply, vs large jar/month supply for $13. Pt states that in his mind we are failing. The system is terrible and it is a failure, and he is surprised that anyone is able to get well. In his mind, we are failing, they ([facility]) are doing great. Pt then stated that he has had to do 10x more work and still has 0 answers. Frustrated about the missed time at work.     Writer explained that given the new info, we need to get your consultation as next steps. Advised pt to wait to start antibiotics and wait till we can ask if he should do wound culture. Pt asked what harm would be in taking the antibiotics. Writer explained to patient that it would be more harmful to his body if there isn't truly an infection. Pt ended call by stating that he will call us back on Monday if his finger is not improved.    Cancelled appt for wound culture collection.

## 2019-10-03 NOTE — TELEPHONE ENCOUNTER
Kyra-    We have not been able to get a hold of the patient.    Received call back from Samira-- She states that she could bring pt in today at 4 or 4:30, but this will only be if she can get a hold of him.  Writer is unaware of current status of finger-- is it the same, worse, or better since OV.     Please advise on what plan should be for patient.    She has not seen pt since his OV with you. Last saw him two weeks ago. She does not know how his finger is. The family gets concerned. He is a bachelor, not super responsive. He has lived alone for years, kind of reclusive. He avoids doctor visits. He has undiagnosed anxiety. She states that he is not very good about following up. Samira was given referral numbers for wound care and dermatology.

## 2019-10-03 NOTE — TELEPHONE ENCOUNTER
Called patient's sister, Samira, notified her of provider message/antibiotics sent. She will try to get a hold of patient. Pt was added to nurse only schedule today for wound culture.

## 2019-10-14 ENCOUNTER — TELEPHONE (OUTPATIENT)
Dept: FAMILY MEDICINE | Facility: CLINIC | Age: 59
End: 2019-10-14

## 2019-10-14 DIAGNOSIS — I10 HYPERTENSION GOAL BP (BLOOD PRESSURE) < 140/90: ICD-10-CM

## 2019-12-02 RX ORDER — LISINOPRIL 20 MG/1
20 TABLET ORAL 2 TIMES DAILY
Qty: 180 TABLET | Refills: 3 | Status: SHIPPED | OUTPATIENT
Start: 2019-12-02 | End: 2021-01-15

## 2019-12-31 ENCOUNTER — OFFICE VISIT (OUTPATIENT)
Dept: FAMILY MEDICINE | Facility: CLINIC | Age: 59
End: 2019-12-31
Payer: COMMERCIAL

## 2019-12-31 VITALS
TEMPERATURE: 97.8 F | HEART RATE: 80 BPM | WEIGHT: 247.8 LBS | DIASTOLIC BLOOD PRESSURE: 88 MMHG | BODY MASS INDEX: 36.59 KG/M2 | SYSTOLIC BLOOD PRESSURE: 138 MMHG

## 2019-12-31 DIAGNOSIS — Z12.11 SPECIAL SCREENING FOR MALIGNANT NEOPLASMS, COLON: ICD-10-CM

## 2019-12-31 DIAGNOSIS — E11.9 TYPE 2 DIABETES MELLITUS WITHOUT COMPLICATION, WITHOUT LONG-TERM CURRENT USE OF INSULIN (H): Primary | ICD-10-CM

## 2019-12-31 DIAGNOSIS — I10 HYPERTENSION GOAL BP (BLOOD PRESSURE) < 140/90: ICD-10-CM

## 2019-12-31 DIAGNOSIS — E66.01 MORBID OBESITY (H): ICD-10-CM

## 2019-12-31 DIAGNOSIS — L24.9 IRRITANT CONTACT DERMATITIS, UNSPECIFIED TRIGGER: ICD-10-CM

## 2019-12-31 DIAGNOSIS — Z23 FLU VACCINE NEED: ICD-10-CM

## 2019-12-31 LAB — HBA1C MFR BLD: 6.7 % (ref 0–5.6)

## 2019-12-31 PROCEDURE — 83036 HEMOGLOBIN GLYCOSYLATED A1C: CPT | Performed by: FAMILY MEDICINE

## 2019-12-31 PROCEDURE — 90682 RIV4 VACC RECOMBINANT DNA IM: CPT | Performed by: FAMILY MEDICINE

## 2019-12-31 PROCEDURE — 36415 COLL VENOUS BLD VENIPUNCTURE: CPT | Performed by: FAMILY MEDICINE

## 2019-12-31 PROCEDURE — 90471 IMMUNIZATION ADMIN: CPT | Performed by: FAMILY MEDICINE

## 2019-12-31 PROCEDURE — 99214 OFFICE O/P EST MOD 30 MIN: CPT | Mod: 25 | Performed by: FAMILY MEDICINE

## 2019-12-31 PROCEDURE — 80061 LIPID PANEL: CPT | Performed by: FAMILY MEDICINE

## 2019-12-31 PROCEDURE — 80048 BASIC METABOLIC PNL TOTAL CA: CPT | Performed by: FAMILY MEDICINE

## 2019-12-31 RX ORDER — AMLODIPINE BESYLATE 5 MG/1
5 TABLET ORAL 2 TIMES DAILY
Qty: 180 TABLET | Refills: 3 | Status: SHIPPED | OUTPATIENT
Start: 2019-12-31 | End: 2020-12-28

## 2019-12-31 RX ORDER — METFORMIN HCL 500 MG
500 TABLET, EXTENDED RELEASE 24 HR ORAL
Qty: 90 TABLET | Refills: 3 | Status: SHIPPED | OUTPATIENT
Start: 2019-12-31 | End: 2021-01-15

## 2019-12-31 RX ORDER — TRIAMCINOLONE ACETONIDE 1 MG/G
CREAM TOPICAL
Qty: 453 G | Refills: 6 | Status: CANCELLED | OUTPATIENT
Start: 2019-12-31

## 2019-12-31 NOTE — PATIENT INSTRUCTIONS
MY DIABETES TODAY:    1)  Goal A1C is under <7.0  Mine is:      Lab Results   Component Value Date    A1C 6.7 12/31/2019       2)  Goal LDL (bad cholesterol) under 100  (measured at least yearly)- I am currently at:   Lab Results   Component Value Date     08/23/2018       3)  Goal blood pressure under 140/90- mine was 138/88 today    4)  Take aspirin daily yes    5)  No tobacco use          ACTION PLAN CHANGES FROM TODAY:    Care Plan changes:    Start metformin 500 mg 1 tablet per day  Stool blood test - mail it in when completed  Labs:     I will not need to fast for this lab appointment.    Follow up:    I will follow up with my physician:  In three months.         Orders Placed This Encounter     VACCINE ADMINISTRATION, INITIAL     INFLUENZA QUAD, RECOMBINANT, P-FREE (RIV4) (FLUBLOCK) [[id number]]     Hemoglobin A1c     Last Lab Result: Hemoglobin A1C (%)       Date                     Value                 07/03/2019               6.3 (H)          ----------     Basic metabolic panel     Lipid panel reflex to direct LDL Fasting     Last Lab Result: LDL Cholesterol Calculated (mg/dL)       Date                     Value                 08/23/2018               124 (H)          ----------     Order Specific Question:   Perform labs while fasting or non-fasting?     Answer:   Fasting     Fecal colorectal cancer screen (FIT)     Standing Status:   Future     Standing Expiration Date:   12/31/2020     amLODIPine (NORVASC) 5 MG tablet     Sig: Take 1 tablet (5 mg) by mouth 2 times daily     Dispense:  180 tablet     Refill:  3     metFORMIN (GLUCOPHAGE-XR) 500 MG 24 hr tablet     Sig: Take 1 tablet (500 mg) by mouth daily (with dinner)     Dispense:  90 tablet     Refill:  3

## 2019-12-31 NOTE — LETTER
January 2, 2020      Abhi Pabon  [address] [place] [address]        Dear Mr. Pabon     The results of your recent lab tests showed the blood sugar to be elevated and the need to take the Metformin. Your cholesterol is also above goal and as we discussed we should consider starting a medication to lower the cholesterol and protect your heart, kidneys and eyes.  Enclosed are the results.  Results for orders placed or performed in visit on 12/31/19   Hemoglobin A1c     Status: Abnormal   Result Value Ref Range    Hemoglobin A1C 6.7 (H) 0 - 5.6 %   Basic metabolic panel     Status: Abnormal   Result Value Ref Range    Sodium 133 133 - 144 mmol/L    Potassium 4.3 3.4 - 5.3 mmol/L    Chloride 103 94 - 109 mmol/L    Carbon Dioxide 24 20 - 32 mmol/L    Anion Gap 6 3 - 14 mmol/L    Glucose 129 (H) 70 - 99 mg/dL    Urea Nitrogen 16 7 - 30 mg/dL    Creatinine 0.85 0.66 - 1.25 mg/dL    GFR Estimate >90 >60 mL/min/[1.73_m2]    GFR Estimate If Black >90 >60 mL/min/[1.73_m2]    Calcium 9.3 8.5 - 10.1 mg/dL   Lipid panel reflex to direct LDL Fasting     Status: Abnormal   Result Value Ref Range    Cholesterol 194 <200 mg/dL    Triglycerides 121 <150 mg/dL    HDL Cholesterol 41 >39 mg/dL    LDL Cholesterol Calculated 129 (H) <100 mg/dL    Non HDL Cholesterol 153 (H) <130 mg/dL     We will review when I see you in 3 months.     Sincerely,       Kristopher Cook MD/wen

## 2019-12-31 NOTE — PROGRESS NOTES
Subjective     Abhi Pabon is a 59 year old male who presents to clinic today for the following health issues:    HPI   Diabetes Follow-up      How often are you checking your blood sugar? Not at all    What concerns do you have today about your diabetes? None and Other: Wants to not be classified as a diabetic anymore      Do you have any of these symptoms? (Select all that apply)  Excessive thirst and Weight gain     Have you had a diabetic eye exam in the last 12 months? No    BP Readings from Last 2 Encounters:   12/31/19 138/88   09/25/19 138/86     Hemoglobin A1C (%)   Date Value   12/31/2019 6.7 (H)   07/03/2019 6.3 (H)     LDL Cholesterol Calculated (mg/dL)   Date Value   08/23/2018 124 (H)       Diabetes Management Resources    Hypertension Follow-up      Do you check your blood pressure regularly outside of the clinic? Yes     Are you following a low salt diet? No    Are your blood pressures ever more than 140 on the top number (systolic) OR more   than 90 on the bottom number (diastolic), for example 140/90? No      How many servings of fruits and vegetables do you eat daily?  4 or more    On average, how many sweetened beverages do you drink each day (Examples: soda, juice, sweet tea, etc.  Do NOT count diet or artificially sweetened beverages)?   0  How many days per week do you miss taking your medication? 1    What makes it hard for you to take your medications?  remembering to take      Eczema  Patient notes he has eczema on his ring finger on his right hand. He did not put cream on it today but has been using clobetasol which seems to be working the best. He also notes he has eczema on the right arm toward the elbow.     He has been working out once a week.       Recent Labs   Lab Test 12/31/19  1232 07/03/19  1431 01/16/19  1602  08/23/18  1607 05/21/18  1546   A1C 6.7* 6.3* 6.5*   < > 5.8*  --    LDL  --   --   --   --  124*  --    HDL  --   --   --   --  54  --    TRIG  --   --   --   --   177*  --    CR  --  0.81  --   --   --  0.82   GFRESTIMATED  --  >90  --   --   --  >90   GFRESTBLACK  --  >90  --   --   --  >90   POTASSIUM  --  3.9  --   --   --  4.3   TSH  --  2.56  --   --   --   --     < > = values in this interval not displayed.      BP Readings from Last 3 Encounters:   12/31/19 138/88   09/25/19 138/86   07/03/19 137/80    Wt Readings from Last 3 Encounters:   12/31/19 112.4 kg (247 lb 12.8 oz)   09/25/19 110.4 kg (243 lb 4.8 oz)   07/03/19 107.9 kg (237 lb 12.8 oz)              Reviewed and updated as needed this visit by Provider         Review of Systems   ROS COMP: Constitutional, HEENT, cardiovascular, pulmonary, GI, , musculoskeletal, neuro, skin, endocrine and psych systems are negative, except as otherwise noted.        This document serves as a record of the services and decisions personally performed and made by Rudy Cook MD. It was created on his behalf by Andres Salcedo, a trained medical scribe. The creation of this document is based on the provider's statements to the medical scribe.  Andres Salcedo 1:16 PM December 31, 2019    Objective    /88 (BP Location: Right arm, Patient Position: Sitting, Cuff Size: Adult Large)   Pulse 80   Temp 97.8  F (36.6  C) (Oral)   Wt 112.4 kg (247 lb 12.8 oz)   BMI 36.59 kg/m    Body mass index is 36.59 kg/m .  Physical Exam   GENERAL: healthy, alert and no distress  RESP: lungs clear to auscultation - no rales, rhonchi or wheezes  CV: regular rate and rhythm, normal S1 S2, no S3 or S4, no murmur, click or rub, no peripheral edema and peripheral pulses strong  ABDOMEN: soft, nontender, no hepatosplenomegaly, no masses and bowel sounds normal  SKIN: no suspicious lesions or rashes  NEURO: Normal strength and tone, mentation intact and speech normal  PSYCH: mentation appears normal, affect normal/bright    Diagnostic Test Results:  Labs reviewed in Epic  Results for orders placed or performed in visit on 12/31/19 (from the past 24  "hour(s))   Hemoglobin A1c   Result Value Ref Range    Hemoglobin A1C 6.7 (H) 0 - 5.6 %           Assessment & Plan       ICD-10-CM    1. Type 2 diabetes mellitus without complication, without long-term current use of insulin (H) E11.9 Hemoglobin A1c     Basic metabolic panel     Lipid panel reflex to direct LDL Fasting     metFORMIN (GLUCOPHAGE-XR) 500 MG 24 hr tablet   2. Obesity (BMI 35.0-39.9) with comorbidity (H) E66.01    3. Hypertension goal BP (blood pressure) < 140/90 I10 amLODIPine (NORVASC) 5 MG tablet   4. Irritant contact dermatitis, unspecified trigger L24.9    5. Special screening for malignant neoplasms, colon Z12.11 Fecal colorectal cancer screen (FIT)   6. Flu vaccine need Z23 VACCINE ADMINISTRATION, INITIAL     INFLUENZA QUAD, RECOMBINANT, P-FREE (RIV4) (FLUBLOCK) [[id number]]   Blood pressure is within acceptable limits of goal. I recommended doing the fit card test for colon screening which he will mail back. As for his eczema I recommended to continue current treatment of cream. Eczematous skin break down of the right ring finger no erythema. He is going to be getting the flu shot today.  Reviewed need to be more aggressive with diabetes. Started metformin today. He declined statin. We will continue to review at his next OV     BMI:   Estimated body mass index is 36.59 kg/m  as calculated from the following:    Height as of 9/25/19: 1.753 m (5' 9\").    Weight as of this encounter: 112.4 kg (247 lb 12.8 oz).   Weight management plan: Discussed healthy diet and exercise guidelines        Patient Instructions     MY DIABETES TODAY:    1)  Goal A1C is under <7.0  Mine is:      Lab Results   Component Value Date    A1C 6.7 12/31/2019       2)  Goal LDL (bad cholesterol) under 100  (measured at least yearly)- I am currently at:   Lab Results   Component Value Date     08/23/2018       3)  Goal blood pressure under 140/90- mine was 138/88 today    4)  Take aspirin daily yes    5)  No tobacco " use          ACTION PLAN CHANGES FROM TODAY:    Care Plan changes:    Start metformin 500 mg 1 tablet per day  Stool blood test - mail it in when completed  Labs:     I will not need to fast for this lab appointment.    Follow up:    I will follow up with my physician:  In three months.         Orders Placed This Encounter     VACCINE ADMINISTRATION, INITIAL     INFLUENZA QUAD, RECOMBINANT, P-FREE (RIV4) (FLUBLOCK) [[id number]]     Hemoglobin A1c     Last Lab Result: Hemoglobin A1C (%)       Date                     Value                 07/03/2019               6.3 (H)          ----------     Basic metabolic panel     Lipid panel reflex to direct LDL Fasting     Last Lab Result: LDL Cholesterol Calculated (mg/dL)       Date                     Value                 08/23/2018               124 (H)          ----------     Order Specific Question:   Perform labs while fasting or non-fasting?     Answer:   Fasting     Fecal colorectal cancer screen (FIT)     Standing Status:   Future     Standing Expiration Date:   12/31/2020     amLODIPine (NORVASC) 5 MG tablet     Sig: Take 1 tablet (5 mg) by mouth 2 times daily     Dispense:  180 tablet     Refill:  3     metFORMIN (GLUCOPHAGE-XR) 500 MG 24 hr tablet     Sig: Take 1 tablet (500 mg) by mouth daily (with dinner)     Dispense:  90 tablet     Refill:  3             Return in about 3 months (around 3/31/2020) for Recheck Blood Sugar.    The information in this document, created by the medical scribe for me, accurately reflects the services I personally performed and the decisions made by me. I have reviewed and approved this document for accuracy prior to leaving the patient care area.  December 31, 2019 1:35 PM    Kristopher Cook MD, MD  [facility]

## 2020-01-01 LAB
ANION GAP SERPL CALCULATED.3IONS-SCNC: 6 MMOL/L (ref 3–14)
BUN SERPL-MCNC: 16 MG/DL (ref 7–30)
CALCIUM SERPL-MCNC: 9.3 MG/DL (ref 8.5–10.1)
CHLORIDE SERPL-SCNC: 103 MMOL/L (ref 94–109)
CHOLEST SERPL-MCNC: 194 MG/DL
CO2 SERPL-SCNC: 24 MMOL/L (ref 20–32)
CREAT SERPL-MCNC: 0.85 MG/DL (ref 0.66–1.25)
GFR SERPL CREATININE-BSD FRML MDRD: >90 ML/MIN/{1.73_M2}
GLUCOSE SERPL-MCNC: 129 MG/DL (ref 70–99)
HDLC SERPL-MCNC: 41 MG/DL
LDLC SERPL CALC-MCNC: 129 MG/DL
NONHDLC SERPL-MCNC: 153 MG/DL
POTASSIUM SERPL-SCNC: 4.3 MMOL/L (ref 3.4–5.3)
SODIUM SERPL-SCNC: 133 MMOL/L (ref 133–144)
TRIGL SERPL-MCNC: 121 MG/DL

## 2020-03-12 ENCOUNTER — TELEPHONE (OUTPATIENT)
Dept: FAMILY MEDICINE | Facility: CLINIC | Age: 60
End: 2020-03-12

## 2020-03-12 NOTE — LETTER
March 12, 2020      Abhi Pabon  [address] [place] [address]          Dear Abhi,    We care about your health and have reviewed your health plan. We have reviewed your medical conditions, medication list, and lab results and are making recommendations based on this review, to better manage your health.    You are in particular need of attention regarding:  - Scheduling a Colon Cancer Screening (FIT) Call clinic to  FIT test and mail completed test to clinic  - Scheduling an Annual Physical / Wellness Visit with your primary care provider    Here is a list of Health Maintenance topics that are due now or due soon:  Health Maintenance Due   Topic Date Due     DIABETIC FOOT EXAM  1960     ADVANCE CARE PLANNING  1960     EYE EXAM  1960     COLORECTAL CANCER SCREENING  06/20/1970     PNEUMOCOCCAL IMMUNIZATION 19-64 MEDIUM RISK (1 of 1 - PPSV23) 06/20/1979     PREVENTIVE CARE VISIT  08/23/2019     PHQ-2  01/01/2020       Please call us at [phone] (or use [employer]) to address the above recommendations.     Thank you for trusting [facility] with your healthcare needs. We appreciate the opportunity to serve you and look forward to supporting you in the future.    Healthy Regards,    Your Care Team

## 2020-09-14 ENCOUNTER — OFFICE VISIT (OUTPATIENT)
Dept: FAMILY MEDICINE | Facility: CLINIC | Age: 60
End: 2020-09-14
Payer: COMMERCIAL

## 2020-09-14 VITALS
TEMPERATURE: 97.8 F | OXYGEN SATURATION: 93 % | SYSTOLIC BLOOD PRESSURE: 139 MMHG | BODY MASS INDEX: 36.18 KG/M2 | WEIGHT: 245 LBS | DIASTOLIC BLOOD PRESSURE: 89 MMHG | HEART RATE: 95 BPM

## 2020-09-14 DIAGNOSIS — I10 HYPERTENSION GOAL BP (BLOOD PRESSURE) < 140/90: ICD-10-CM

## 2020-09-14 DIAGNOSIS — Z13.6 CARDIOVASCULAR SCREENING; LDL GOAL LESS THAN 100: ICD-10-CM

## 2020-09-14 DIAGNOSIS — E11.9 TYPE 2 DIABETES MELLITUS WITHOUT COMPLICATION, WITHOUT LONG-TERM CURRENT USE OF INSULIN (H): Primary | ICD-10-CM

## 2020-09-14 DIAGNOSIS — Z23 FLU VACCINE NEED: ICD-10-CM

## 2020-09-14 PROBLEM — E66.01 MORBID OBESITY (H): Status: RESOLVED | Noted: 2018-10-18 | Resolved: 2020-09-14

## 2020-09-14 PROBLEM — E66.812 CLASS 2 DRUG-INDUCED OBESITY WITH SERIOUS COMORBIDITY AND BODY MASS INDEX (BMI) OF 36.0 TO 36.9 IN ADULT: Status: ACTIVE | Noted: 2018-07-23

## 2020-09-14 PROBLEM — E66.1 CLASS 2 DRUG-INDUCED OBESITY WITH SERIOUS COMORBIDITY AND BODY MASS INDEX (BMI) OF 36.0 TO 36.9 IN ADULT: Status: ACTIVE | Noted: 2018-07-23

## 2020-09-14 LAB — HBA1C MFR BLD: 6.3 % (ref 0–5.6)

## 2020-09-14 PROCEDURE — 82043 UR ALBUMIN QUANTITATIVE: CPT | Performed by: FAMILY MEDICINE

## 2020-09-14 PROCEDURE — 90682 RIV4 VACC RECOMBINANT DNA IM: CPT | Performed by: FAMILY MEDICINE

## 2020-09-14 PROCEDURE — 90471 IMMUNIZATION ADMIN: CPT | Performed by: FAMILY MEDICINE

## 2020-09-14 PROCEDURE — 36415 COLL VENOUS BLD VENIPUNCTURE: CPT | Performed by: FAMILY MEDICINE

## 2020-09-14 PROCEDURE — 83036 HEMOGLOBIN GLYCOSYLATED A1C: CPT | Performed by: FAMILY MEDICINE

## 2020-09-14 PROCEDURE — 99214 OFFICE O/P EST MOD 30 MIN: CPT | Mod: 25 | Performed by: FAMILY MEDICINE

## 2020-09-14 PROCEDURE — 80048 BASIC METABOLIC PNL TOTAL CA: CPT | Performed by: FAMILY MEDICINE

## 2020-09-14 RX ORDER — DOXYCYCLINE 100 MG/1
100 CAPSULE ORAL 2 TIMES DAILY
COMMUNITY
Start: 2020-09-10 | End: 2020-11-17

## 2020-09-14 ASSESSMENT — PAIN SCALES - GENERAL: PAINLEVEL: NO PAIN (0)

## 2020-09-14 NOTE — LETTER
September 18, 2020      Abhi Pabon  [address] [place] [address]        Dear ,    We are writing to inform you of your test results.    {results letter list:[id number]}    Resulted Orders   Hemoglobin A1c   Result Value Ref Range    Hemoglobin A1C 6.3 (H) 0 - 5.6 %      Comment:      Normal <5.7% Prediabetes 5.7-6.4%  Diabetes 6.5% or higher - adopted from ADA   consensus guidelines.     Basic metabolic panel  (Ca, Cl, CO2, Creat, Gluc, K, Na, BUN)   Result Value Ref Range    Sodium 131 (L) 133 - 144 mmol/L    Potassium 4.1 3.4 - 5.3 mmol/L    Chloride 100 94 - 109 mmol/L    Carbon Dioxide 25 20 - 32 mmol/L    Anion Gap 7 3 - 14 mmol/L    Glucose 103 (H) 70 - 99 mg/dL    Urea Nitrogen 10 7 - 30 mg/dL    Creatinine 0.88 0.66 - 1.25 mg/dL    GFR Estimate >90 >60 mL/min/[1.73_m2]      Comment:      Non  GFR Calc  Starting 12/18/2018, serum creatinine based estimated GFR (eGFR) will be   calculated using the Chronic Kidney Disease Epidemiology Collaboration   (CKD-EPI) equation.      GFR Estimate If Black >90 >60 mL/min/[1.73_m2]      Comment:       GFR Calc  Starting 12/18/2018, serum creatinine based estimated GFR (eGFR) will be   calculated using the Chronic Kidney Disease Epidemiology Collaboration   (CKD-EPI) equation.      Calcium 9.2 8.5 - 10.1 mg/dL   Albumin Random Urine Quantitative with Creat Ratio   Result Value Ref Range    Creatinine Urine 20 mg/dL    Albumin Urine mg/L 36 mg/L    Albumin Urine mg/g Cr 183.25 (H) 0 - 17 mg/g Cr       If you have any questions or concerns, please call the clinic at the number listed above.       Sincerely,        Kristopher Cook MD, MD

## 2020-09-14 NOTE — LETTER
"[address] [id number] [phone]          September 21, 2020      Dear Mr. Pabon     The results of your recent lab tests were overall good. The diabetes is in good control  The urine albumin/protein is present and the best way to protect your kidneys is to control the blood pressure, the blood sugars and the cholesterol. As we have discussed before taking a cholesterol medication for the cholesterol would be recommended.  You can review this at your next office appointment. If you change your mind on taking the medication let me know and we can order it for you.     Ways to improve your cholesterol...     1- Eats less saturated fats (including avoiding \"trans\" fats).     2 - Eat more unsaturated fats  - found in vegetables, grains, and tree nuts.   Also by replacing butter with canola oil or olive oil.     3 - Eat more nuts.   1-2 ounces (a small handful) of almonds, walnuts, hazelnuts or pecans once a day in place of other less healthy snacks.     4 - Eat more high fiber foods - vegetables and whole grains including oat bran, oats, beans, peas, and flax seed.     5 - Eat more fish - such as salmon, tuna, mackerel, and sardines.  1 or 2 six ounce servings per week is a healthy replacement for other proteins.     6 - Exercise for at least 120 minutes per week - which is equal to 30 minutes 4 days per week.           Sincerely,       Kristopher Cook MD/    Results for orders placed or performed in visit on 09/14/20   Hemoglobin A1c     Status: Abnormal   Result Value Ref Range    Hemoglobin A1C 6.3 (H) 0 - 5.6 %   Basic metabolic panel  (Ca, Cl, CO2, Creat, Gluc, K, Na, BUN)     Status: Abnormal   Result Value Ref Range    Sodium 131 (L) 133 - 144 mmol/L    Potassium 4.1 3.4 - 5.3 mmol/L    Chloride 100 94 - 109 mmol/L    Carbon Dioxide 25 20 - 32 mmol/L    Anion Gap 7 3 - 14 mmol/L    Glucose 103 (H) 70 - 99 mg/dL    Urea Nitrogen 10 7 - 30 mg/dL "    Creatinine 0.88 0.66 - 1.25 mg/dL    GFR Estimate >90 >60 mL/min/[1.73_m2]    GFR Estimate If Black >90 >60 mL/min/[1.73_m2]    Calcium 9.2 8.5 - 10.1 mg/dL   Albumin Random Urine Quantitative with Creat Ratio     Status: Abnormal   Result Value Ref Range    Creatinine Urine 20 mg/dL    Albumin Urine mg/L 36 mg/L    Albumin Urine mg/g Cr 183.25 (H) 0 - 17 mg/g Cr

## 2020-09-14 NOTE — PATIENT INSTRUCTIONS
Orders Placed This Encounter     INFLUENZA QUAD, RECOMBINANT, P-FREE (RIV4) (FLUBLOCK) [[id number]]     Hemoglobin A1c     Last Lab Result: Hemoglobin A1C (%)       Date                     Value                 12/31/2019               6.7 (H)          ----------     Basic metabolic panel  (Ca, Cl, CO2, Creat, Gluc, K, Na, BUN)     Albumin Random Urine Quantitative with Creat Ratio     Last Lab Result: No results found for: MICROALBUMIN     Order Specific Question:   Includes     Answer:   with Creat Ratio     OPTOMETRY REFERRAL     Referral Priority:   Routine     Referral Type:   Vision Services     Requested Specialty:   Ophthalmology     Number of Visits Requested:   1     doxycycline monohydrate (MONODOX) 100 MG capsule     Sig: Take 100 mg by mouth 2 times daily     Do not take doxycycline at bedtime    Restart Metformin when you finish the doxycycline.     Sandrita Poole would be good to see after me

## 2020-09-14 NOTE — PROGRESS NOTES
Subjective     Abhi Pabon is a 60 year old male who presents to clinic today for the following health issues:    HPI       Diabetes Follow-up      How often are you checking your blood sugar? Not at all    What concerns do you have today about your diabetes? None     Do you have any of these symptoms? (Select all that apply)  No numbness or tingling in feet.  No redness, sores or blisters on feet.  No complaints of excessive thirst.  No reports of blurry vision.  No significant changes to weight.    Have you had a diabetic eye exam in the last 12 months? No        BP Readings from Last 2 Encounters:   09/14/20 139/89   12/31/19 138/88     Hemoglobin A1C (%)   Date Value   09/14/2020 6.3 (H)   12/31/2019 6.7 (H)     LDL Cholesterol Calculated (mg/dL)   Date Value   12/31/2019 129 (H)   08/23/2018 124 (H)         Hypertension Follow-up      Do you check your blood pressure regularly outside of the clinic? Yes     Are you following a low salt diet? No    Are your blood pressures ever more than 140 on the top number (systolic) OR more   than 90 on the bottom number (diastolic), for example 140/90? No      How many servings of fruits and vegetables do you eat daily?  4 or more    On average, how many sweetened beverages do you drink each day (Examples: soda, juice, sweet tea, etc.  Do NOT count diet or artificially sweetened beverages)?   0    How many days per week do you exercise enough to make your heart beat faster? 7    How many minutes a day do you exercise enough to make your heart beat faster? 30 - 60  How many days per week do you miss taking your medication? Patient does not take metformin all of the time because ti gives him an upset stomach     What makes it hard for you to take your medications?  side effects    He is presently being treated with Doxycycline for a staph infection by dermatology. He had some GI distress so had stopped his metformin and was not taking his lisinopril regularly.  He is  able to take his BP at home and has been normal when he takes his medication.     He has been taking his Doxy at bedtime. Reviewed need to take at meal time and not to lie down after taking. Also not to take with dairy products.    Review of Systems   Constitutional, HEENT, cardiovascular, pulmonary, gi and gu systems are negative, except as otherwise noted.      Objective    /89   Pulse 95   Temp 97.8  F (36.6  C) (Oral)   Wt 111.1 kg (245 lb)   SpO2 93%   BMI 36.18 kg/m    Body mass index is 36.18 kg/m .  Physical Exam   GENERAL: alert, no distress and over weight  NECK: no adenopathy, no asymmetry, masses, or scars and thyroid normal to palpation  RESP: lungs clear to auscultation - no rales, rhonchi or wheezes  CV: regular rate and rhythm, normal S1 S2, no S3 or S4, no murmur, click or rub, no peripheral edema and peripheral pulses strong  ABDOMEN: soft, nontender, no hepatosplenomegaly, no masses and bowel sounds normal  MS: no gross musculoskeletal defects noted, no edema  SKIN: redness of left finger. Non tender    Results for orders placed or performed in visit on 09/14/20 (from the past 24 hour(s))   Hemoglobin A1c   Result Value Ref Range    Hemoglobin A1C 6.3 (H) 0 - 5.6 %           Assessment & Plan     Type 2 diabetes mellitus without complication, without long-term current use of insulin (H)  Controlled,  Continue present medications    - Hemoglobin A1c  - Basic metabolic panel  (Ca, Cl, CO2, Creat, Gluc, K, Na, BUN)  - Albumin Random Urine Quantitative with Creat Ratio  - OPTOMETRY REFERRAL  - INFLUENZA QUAD, RECOMBINANT, P-FREE (RIV4) (FLUBLOCK) [[id number]]    CARDIOVASCULAR SCREENING; LDL GOAL LESS THAN 100  Declines statin reviewed benefits    Hypertension goal BP (blood pressure) < 140/90  Elevated today due to not taking medications. Home blood pressures and recheck good. Needs to stay on meds    Flu vaccine need    - INFLUENZA QUAD, RECOMBINANT, P-FREE (RIV4) (FLUBLOCK) [[id number]]     BMI:  "  Estimated body mass index is 36.18 kg/m  as calculated from the following:    Height as of 9/25/19: 1.753 m (5' 9\").    Weight as of this encounter: 111.1 kg (245 lb).   Weight management plan: Discussed healthy diet and exercise guidelines        Patient Instructions     Orders Placed This Encounter     INFLUENZA QUAD, RECOMBINANT, P-FREE (RIV4) (FLUBLOCK) [[id number]]     Hemoglobin A1c     Last Lab Result: Hemoglobin A1C (%)       Date                     Value                 12/31/2019               6.7 (H)          ----------     Basic metabolic panel  (Ca, Cl, CO2, Creat, Gluc, K, Na, BUN)     Albumin Random Urine Quantitative with Creat Ratio     Last Lab Result: No results found for: MICROALBUMIN     Order Specific Question:   Includes     Answer:   with Creat Ratio     OPTOMETRY REFERRAL     Referral Priority:   Routine     Referral Type:   Vision Services     Requested Specialty:   Ophthalmology     Number of Visits Requested:   1     doxycycline monohydrate (MONODOX) 100 MG capsule     Sig: Take 100 mg by mouth 2 times daily     Do not take doxycycline at bedtime    Restart Metformin when you finish the doxycycline.     Sandrtia Poole would be good to see after me          Return in about 6 months (around 3/14/2021) for Diabetes follow up.    Kristopher Cook MD, MD  [facility]    "

## 2020-09-15 LAB
ANION GAP SERPL CALCULATED.3IONS-SCNC: 7 MMOL/L (ref 3–14)
BUN SERPL-MCNC: 10 MG/DL (ref 7–30)
CALCIUM SERPL-MCNC: 9.2 MG/DL (ref 8.5–10.1)
CHLORIDE SERPL-SCNC: 100 MMOL/L (ref 94–109)
CO2 SERPL-SCNC: 25 MMOL/L (ref 20–32)
CREAT SERPL-MCNC: 0.88 MG/DL (ref 0.66–1.25)
CREAT UR-MCNC: 20 MG/DL
GFR SERPL CREATININE-BSD FRML MDRD: >90 ML/MIN/{1.73_M2}
GLUCOSE SERPL-MCNC: 103 MG/DL (ref 70–99)
MICROALBUMIN UR-MCNC: 36 MG/L
MICROALBUMIN/CREAT UR: 183.25 MG/G CR (ref 0–17)
POTASSIUM SERPL-SCNC: 4.1 MMOL/L (ref 3.4–5.3)
SODIUM SERPL-SCNC: 131 MMOL/L (ref 133–144)

## 2020-11-17 ENCOUNTER — OFFICE VISIT (OUTPATIENT)
Dept: FAMILY MEDICINE | Facility: CLINIC | Age: 60
End: 2020-11-17
Payer: COMMERCIAL

## 2020-11-17 VITALS
HEART RATE: 90 BPM | HEIGHT: 69 IN | SYSTOLIC BLOOD PRESSURE: 150 MMHG | BODY MASS INDEX: 37.03 KG/M2 | OXYGEN SATURATION: 98 % | WEIGHT: 250 LBS | TEMPERATURE: 97.5 F | DIASTOLIC BLOOD PRESSURE: 90 MMHG

## 2020-11-17 DIAGNOSIS — E66.01 MORBID OBESITY (H): ICD-10-CM

## 2020-11-17 DIAGNOSIS — I10 HYPERTENSION GOAL BP (BLOOD PRESSURE) < 140/90: ICD-10-CM

## 2020-11-17 DIAGNOSIS — L30.9 ECZEMA, UNSPECIFIED TYPE: ICD-10-CM

## 2020-11-17 DIAGNOSIS — L03.011 CELLULITIS OF FINGER OF RIGHT HAND: Primary | ICD-10-CM

## 2020-11-17 PROCEDURE — 99214 OFFICE O/P EST MOD 30 MIN: CPT | Performed by: FAMILY MEDICINE

## 2020-11-17 RX ORDER — SULFAMETHOXAZOLE/TRIMETHOPRIM 800-160 MG
1 TABLET ORAL 2 TIMES DAILY
Qty: 20 TABLET | Refills: 0 | Status: SHIPPED | OUTPATIENT
Start: 2020-11-17 | End: 2020-11-27

## 2020-11-17 ASSESSMENT — PAIN SCALES - GENERAL: PAINLEVEL: NO PAIN (0)

## 2020-11-17 ASSESSMENT — MIFFLIN-ST. JEOR: SCORE: 1938.98

## 2020-11-17 NOTE — PROGRESS NOTES
"Subjective     Abhi Pabon is a 60 year old male who presents to clinic today for the following health issues:    HPI         Patient came in with possible staph infection to both middle left fingers.   Patient reports he does have history of eczema in his fingers.  He reports that he does get a flareup with his eczema, cracked fingers.  Patient during cold weather he does work outside.    He does have clobetasol cream which he has not been using.  He is not putting any lotion as well.    6 weeks ago he was treated with doxycycline for possible skin infection.  He reports when he was taking the antibiotic his skin healed well he has no cracks, no open wound.  Now, for the past 3 days he is having more cracked, open wound, more discharges.  No bleeding.    History of hypertension, has not taken his medication for the past 2 days.  He denies headache, denies chest pain, denies lower extremity edema.     ROS: 10 point ROS neg other than the symptoms noted above in the HPI.    Past Medical History:   Diagnosis Date     Dyslexia      Hypertension    EXAM:  Vital signs:  Temp: 97.5  F (36.4  C) Temp src: Oral BP: (!) 150/90 Pulse: 90     SpO2: 98 %     Height: 176 cm (5' 9.29\") Weight: 113.4 kg (250 lb)  Estimated body mass index is 36.61 kg/m  as calculated from the following:    Height as of this encounter: 1.76 m (5' 9.29\").    Weight as of this encounter: 113.4 kg (250 lb).      Constitutional: healthy, alert and no distress   SKIN: Dry, cracked fingers, right middle right index.  Small area of redness, swelling, no discharges, no bleeding.          Assessment & Plan     Cellulitis of finger of right hand    - sulfamethoxazole-trimethoprim (BACTRIM DS) 800-160 MG tablet; Take 1 tablet by mouth 2 times daily for 10 days    Morbid obesity (H)      Eczema, unspecified type  History of eczema.  Patient was advised to continue using his Clobetasol cream twice daily for 5 days, then as needed.  Continue with lotion " either Aveeno cream or Eucerin cream multiple times, to prevent dryness.  History of hypertension, blood pressure is on the high side.  Patient was advised to go back and start taking his medication every day.    Keep hand moisturized warm prevent dryness.  Asked to be off until next Monday, given a note to be off until next Monday    Patient Instructions   Apply Eucerin cream or Aveeno cream bid to tid as needed to keep hand lotionsed  Start taken blood pressure medication, do not skip       No follow-ups on file.    Amanda Osorio MD  [facility]

## 2020-11-17 NOTE — LETTER
November 17, 2020      Abhi Pabon  [address] [place] [address]        To Whom It May Concern:    Abhi Pabon  was seen on 11/17/2020.  Please excuse him  until 11/23/2020 due to illness.        Sincerely,        Amanda Osorio MD

## 2020-11-17 NOTE — PATIENT INSTRUCTIONS
Apply Eucerin cream or Aveeno cream bid to tid as needed to keep hand lotionsed  Start taken blood pressure medication, do not skip

## 2020-11-18 RX ORDER — CLOBETASOL PROPIONATE 0.5 MG/G
1 OINTMENT TOPICAL DAILY
Qty: 60 G | Refills: 6 | Status: SHIPPED | OUTPATIENT
Start: 2020-11-18 | End: 2021-09-07

## 2020-11-24 ENCOUNTER — TELEPHONE (OUTPATIENT)
Dept: FAMILY MEDICINE | Facility: CLINIC | Age: 60
End: 2020-11-24

## 2020-11-24 NOTE — LETTER
November 24, 2020      Abhi Pabon  [address] [place] [address]        To Whom It May Concern:  To Whom It May Concern:     JAIR Pabon  was seen on 11/17/2020.  Please excuse him  until 11/30/2020 due to illness.             Sincerely,        Amanda Osorio MD

## 2020-11-24 NOTE — TELEPHONE ENCOUNTER
Reason for Call:  Other     Detailed comments:  Patient saw Dr Osorio last week and was diagnosed with cellulitis on his fingers. Patients fingers are still very tender and still very painful. 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 Dr Osorio had written a note for work but patient was asking if Dr Osorio could write another note for him to be off until this coming Monday. Patient can  note at .    Phone Number Patient can be reached at: Home number on file [phone] (home)    Best Time: any    Can we leave a detailed message on this number? YES    Call taken on 11/24/2020 at 12:20 PM by Deidre Kennedy

## 2020-11-24 NOTE — LETTER
November 24, 2020      Abhi Pabon  [address] [place] [address]        To Whom It May Concern:    JAIR Pabon  was seen on 11/17/2020.  Please excuse him  until 11/230/2020 due to illness.      Sincerely,        Amanda Osorio MD

## 2020-12-24 ENCOUNTER — VIRTUAL VISIT (OUTPATIENT)
Dept: URGENT CARE | Facility: CLINIC | Age: 60
End: 2020-12-24
Payer: COMMERCIAL

## 2020-12-24 ENCOUNTER — NURSE TRIAGE (OUTPATIENT)
Dept: NURSING | Facility: CLINIC | Age: 60
End: 2020-12-24

## 2020-12-24 DIAGNOSIS — Z20.822 SUSPECTED COVID-19 VIRUS INFECTION: Primary | ICD-10-CM

## 2020-12-24 PROCEDURE — 99213 OFFICE O/P EST LOW 20 MIN: CPT | Mod: TEL | Performed by: FAMILY MEDICINE

## 2020-12-24 NOTE — TELEPHONE ENCOUNTER
Called about symptoms of possible COVID-19 infection and right hand wound infection.  States he was diagnosed and treated for staph infection of his right hand x 2 months ago   Caller states he is not sure if the staph infection completely resolved even with antibiotic.  States that his right hand looks rougher, no drainage and no increase tenderness.  Caller denied redness at the time of this call.  See initial assessment for COVID-19 infection below.  Gely Neely RN  COVID 19 Nurse Triage Plan/Patient Instructions    Please be aware that novel coronavirus (COVID-19) may be circulating in the community. If you develop symptoms such as fever, cough, or SOB or if you have concerns about the presence of another infection including coronavirus (COVID-19), please contact your health care provider or visit [email or web address].     Disposition/Instructions    Virtual Visit with provider recommended. Reference Visit Selection Guide.    Thank you for taking steps to prevent the spread of this virus.  o Limit your contact with others.  o Wear a simple mask to cover your cough.  o Wash your hands well and often.    Resources    [facility]: About COVID-19: [email or web address]    CDC: What to Do If You're Sick: www.cdc.gov/coronavirus/2019-ncov/about/steps-when-sick.html    CDC: Ending Home Isolation: www.cdc.gov/coronavirus/2019-ncov/hcp/disposition-in-home-patients.html     CDC: Caring for Someone: www.cdc.gov/coronavirus/2019-ncov/if-you-are-sick/care-for-someone.html     [facility]: Interim Guidance for Hospital Discharge to Home: www.health[facility]/diseases/coronavirus/hcp/hospdischarge.pdf    [facility] clinical trials (COVID-19 research studies): [email or web address]/umn-[email or web address]-trials     Below are the COVID-19 hotlines at the [place] Department of Health ([facility]). Interpreters are available.   o For health questions: Call 612-323-3229 or 1-940.123.4702 (7 a.m. to 7 p.m.)  o For questions  about schools and childcare: Call 149-431-5128 or 1-270.353.1677 (7 a.m. to 7 p.m.)                     Reason for Disposition    [1] COVID-19 infection suspected by caller or triager AND [2] mild symptoms (cough, fever, or others) AND [3] no complications or SOB     Telephone visit    Additional Information    Negative: Serious injury with multiple fractures    Negative: [1] Major bleeding (e.g., actively dripping or spurting) AND [2] can't be stopped    Negative: Amputation    Negative: Sounds like a life-threatening emergency to the triager    Negative: Finger injury is main concern    Negative: Caused by an animal bite    Negative: Caused by a human bite    Negative: [1] Widespread rash AND [2] bright red, sunburn-like AND [3] too weak to stand    Negative: Sounds like a life-threatening emergency to the triager    Negative: Stitches (or staples) and  not  infected    Negative: Skin glue used to close wound and not infected    Negative:  surgical wound infection suspected    Negative: Surgical wound infection suspected (post-op)    Negative: Animal bite wound infection suspected    Negative: [1] Widespread rash AND [2] bright red, sunburn-like    Negative: Severe pain in the wound    Negative: Black (necrotic) or blisters develop in wound    Negative: Patient sounds very sick or weak to the triager    Negative: [1] Looks infected (spreading redness, red streak, pus) AND [2] fever    Negative: [1] Red streak runs from the wound AND [2] longer than 1 inch (2.5 cm)    Negative: [1] Skin around the wound has become red AND [2] larger than 2 inches (5 cm)    Negative: [1] Face wound AND [2] looks infected (e.g., spreading redness)    Negative: [1] Finger wound AND [2] entire finger swollen    Negative: [1] Red area or streak AND [2] no fever    Negative: [1] Pus or cloudy fluid draining from wound AND [2] no fever    Negative: [1] Wound > 48 hours old AND [2] it becomes more tender    Negative: Other signs  of wound infection    Negative: [1] Taking antibiotic > 48 hours (2 days) AND [2] fever persists    Negative: [1] Taking antibiotic > 72 hours (3 days) AND [2] infected wound not improved (i.e., pain, pus, redness)    Negative: [1] No prior tetanus shots (or is not fully vaccinated) AND [2] any wound (e.g., cut, scrape)    Negative: Pimple where a stitch comes through the skin    Negative: [1] Taking antibiotic < 48 hours for wound infection AND [2] fever persists    Negative: [1] Taking antibiotic < 72 hours (3 days) AND [2] infected wound doesn't look better    Wound doesn't sound infected    Negative: SEVERE difficulty breathing (e.g., struggling for each breath, speaks in single words)    Negative: Difficult to awaken or acting confused (e.g., disoriented, slurred speech)    Negative: Bluish (or gray) lips or face now    Negative: Shock suspected (e.g., cold/pale/clammy skin, too weak to stand, low BP, rapid pulse)    Negative: Sounds like a life-threatening emergency to the triager    Negative: [1] COVID-19 exposure AND [2] no symptoms    Negative: [1] Lives with someone known to have influenza (flu test positive) AND [2] flu-like symptoms (e.g., cough, runny nose, sore throat, SOB; with or without fever)    Negative: [1] Adult with possible COVID-19 symptoms AND [2] triager concerned about severity of symptoms or other causes    Negative: Immunization reaction suspected (e.g., fever, headache, muscle aches occurring during days 1-3 days after immunization)    Negative: COVID-19 and breastfeeding, questions about    Negative: SEVERE or constant chest pain or pressure (Exception: mild central chest pain, present only when coughing)    Negative: MODERATE difficulty breathing (e.g., speaks in phrases, SOB even at rest, pulse 100-120)    Negative: [1] Headache AND [2] stiff neck (can't touch chin to chest)    Negative: MILD difficulty breathing (e.g., minimal/no SOB at rest, SOB with walking, pulse <100)     "Negative: Chest pain or pressure    Negative: Patient sounds very sick or weak to the triager    Negative: Fever > 103 F (39.4 C)    Negative: [1] Fever > 101 F (38.3 C) AND [2] age > 60    Negative: [1] Fever > 100.0 F (37.8 C) AND [2] bedridden (e.g., nursing home patient, CVA, chronic illness, recovering from surgery)    Negative: [1] HIGH RISK patient (e.g., age > 64 years, diabetes, heart or lung disease, weak immune system) AND [2] new or worsening symptoms    Negative: [1] HIGH RISK patient AND [2] influenza is widespread in the community AND [3] ONE OR MORE respiratory symptoms: cough, sore throat, runny or stuffy nose    Negative: [1] HIGH RISK patient AND [2] influenza exposure within the last 7 days AND [3] ONE OR MORE respiratory symptoms: cough, sore throat, runny or stuffy nose    Negative: Fever present > 3 days (72 hours)    Negative: [1] Fever returns after gone for over 24 hours AND [2] symptoms worse or not improved    Negative: [1] Continuous (nonstop) coughing interferes with work or school AND [2] no improvement using cough treatment per protocol    Wound looks infected     Per patient    Answer Assessment - Initial Assessment Questions  1. COVID-19 DIAGNOSIS: \"Who made your Coronavirus (COVID-19) diagnosis?\" \"Was it confirmed by a positive lab test?\" If not diagnosed by a HCP, ask \"Are there lots of cases (community spread) where you live?\" (See public health department website, if unsure)      Yes  2. COVID-19 EXPOSURE: \"Was there any known exposure to COVID before the symptoms began?\" CDC Definition of close contact: within 6 feet (2 meters) for a total of 15 minutes or more over a 24-hour period.       Not sure  3. ONSET: \"When did the COVID-19 symptoms start?\"       Last Sunday (12/20)  4. WORST SYMPTOM: \"What is your worst symptom?\" (e.g., cough, fever, shortness of breath, muscle aches)      Chills  5. COUGH: \"Do you have a cough?\" If so, ask: \"How bad is the cough?\"        No  6. FEVER: " "\"Do you have a fever?\" If so, ask: \"What is your temperature, how was it measured, and when did it start?\"      Not sure-no device  7. RESPIRATORY STATUS: \"Describe your breathing?\" (e.g., shortness of breath, wheezing, unable to speak)       No  8. BETTER-SAME-WORSE: \"Are you getting better, staying the same or getting worse compared to yesterday?\"  If getting worse, ask, \"In what way?\"      Same  9. HIGH RISK DISEASE: \"Do you have any chronic medical problems?\" (e.g., asthma, heart or lung disease, weak immune system, obesity, etc.)      No  10. PREGNANCY: \"Is there any chance you are pregnant?\" \"When was your last menstrual period?\"        No  11. OTHER SYMPTOMS: \"Do you have any other symptoms?\"  (e.g., chills, fatigue, headache, loss of smell or taste, muscle pain, sore throat; new loss of smell or taste especially support the diagnosis of COVID-19)        Yes-runny nose, chills    Protocols used: CORONAVIRUS (COVID-19) DIAGNOSED OR [id number] 12.1, HAND AND WRIST INJURY-A-AH, WOUND INFECTION-A-AH      "

## 2020-12-24 NOTE — PROGRESS NOTES
Called twice and left messages.     SUBJECTIVE:   1: Works for [employer].     symptoms started about a week ago, Thinks has a sinus infection- nurse says tested for covid. Runny nose, facial pressure, tired, mild headache, chills, no cough nor fever. No sore throat. No loss of sense of smell nor taste.     Seen by  to help with symptoms.     OBJECTIVE: phone visit      ICD-10-CM    1. Suspected COVID-19 virus infection  Z20.828 Symptomatic COVID-19 Virus (Coronavirus) by PCR    mild symptoms .discused return to work and testing.     Time 7 minutes

## 2020-12-26 ENCOUNTER — AMBULATORY - HEALTHEAST (OUTPATIENT)
Dept: FAMILY MEDICINE | Facility: CLINIC | Age: 60
End: 2020-12-26

## 2020-12-26 DIAGNOSIS — Z20.822 SUSPECTED COVID-19 VIRUS INFECTION: ICD-10-CM

## 2020-12-27 ENCOUNTER — AMBULATORY - HEALTHEAST (OUTPATIENT)
Dept: FAMILY MEDICINE | Facility: CLINIC | Age: 60
End: 2020-12-27

## 2020-12-27 DIAGNOSIS — Z20.822 SUSPECTED COVID-19 VIRUS INFECTION: ICD-10-CM

## 2020-12-28 RX ORDER — AMLODIPINE BESYLATE 5 MG/1
5 TABLET ORAL 2 TIMES DAILY
Qty: 180 TABLET | Refills: 0 | Status: SHIPPED | OUTPATIENT
Start: 2020-12-28 | End: 2021-01-15

## 2020-12-29 ENCOUNTER — COMMUNICATION - HEALTHEAST (OUTPATIENT)
Dept: SCHEDULING | Facility: CLINIC | Age: 60
End: 2020-12-29

## 2020-12-30 ENCOUNTER — COMMUNICATION - HEALTHEAST (OUTPATIENT)
Dept: SCHEDULING | Facility: CLINIC | Age: 60
End: 2020-12-30

## 2021-01-14 DIAGNOSIS — I10 HYPERTENSION GOAL BP (BLOOD PRESSURE) < 140/90: ICD-10-CM

## 2021-01-14 RX ORDER — LISINOPRIL 20 MG/1
20 TABLET ORAL 2 TIMES DAILY
Qty: 180 TABLET | Refills: 3 | OUTPATIENT
Start: 2021-01-14

## 2021-01-14 NOTE — TELEPHONE ENCOUNTER
Patient has been out of his lisinopril (PRINIVIL/ZESTRIL) 20 MG tablet for 2 weeks now. He is scheduled in a same day/virtual slot on 01/15/21nacho/Virgilio Can he have a face to face for the visit instead? Please call [phone] to notify patient?

## 2021-01-15 ENCOUNTER — VIRTUAL VISIT (OUTPATIENT)
Dept: FAMILY MEDICINE | Facility: CLINIC | Age: 61
End: 2021-01-15
Payer: COMMERCIAL

## 2021-01-15 DIAGNOSIS — E11.9 TYPE 2 DIABETES MELLITUS WITHOUT COMPLICATION, WITHOUT LONG-TERM CURRENT USE OF INSULIN (H): ICD-10-CM

## 2021-01-15 DIAGNOSIS — S99.929S INJURY OF TOE, UNSPECIFIED LATERALITY, SEQUELA: ICD-10-CM

## 2021-01-15 DIAGNOSIS — I10 HYPERTENSION GOAL BP (BLOOD PRESSURE) < 140/90: Primary | ICD-10-CM

## 2021-01-15 DIAGNOSIS — L30.9 ECZEMA, UNSPECIFIED TYPE: ICD-10-CM

## 2021-01-15 PROCEDURE — 99213 OFFICE O/P EST LOW 20 MIN: CPT | Mod: TEL | Performed by: NURSE PRACTITIONER

## 2021-01-15 RX ORDER — LISINOPRIL 20 MG/1
20 TABLET ORAL 2 TIMES DAILY
Qty: 180 TABLET | Refills: 3 | Status: SHIPPED | OUTPATIENT
Start: 2021-01-15 | End: 2021-01-26

## 2021-01-15 RX ORDER — AMLODIPINE BESYLATE 5 MG/1
5 TABLET ORAL 2 TIMES DAILY
Qty: 180 TABLET | Refills: 0 | Status: SHIPPED | OUTPATIENT
Start: 2021-01-15 | End: 2021-01-26

## 2021-01-15 RX ORDER — METFORMIN HCL 500 MG
500 TABLET, EXTENDED RELEASE 24 HR ORAL
Qty: 90 TABLET | Refills: 3 | Status: SHIPPED | OUTPATIENT
Start: 2021-01-15 | End: 2022-03-02

## 2021-01-15 NOTE — PROGRESS NOTES
Abhi is a 60 year old who is being evaluated via a billable telephone visit.      What phone number would you like to be contacted at? [phone]  How would you like to obtain your AVS?   Assessment & Plan   Problem List Items Addressed This Visit     Hypertension goal BP (blood pressure) < 140/90 - Primary    Relevant Medications    lisinopril (ZESTRIL) 20 MG tablet    amLODIPine (NORVASC) 5 MG tablet    Eczema, unspecified type    Type 2 diabetes mellitus without complication, without long-term current use of insulin (H)    Relevant Medications    metFORMIN (GLUCOPHAGE-XR) 500 MG 24 hr tablet      Other Visit Diagnoses     Injury of toe, unspecified laterality, sequela            New patient transfer of care.  I have never met patient in person.  Blood pressures seem uncontrolled in epic patient reports otherwise at home.  Medications refilled.  Regarding insurance work related issue I am not involved in this at all apparently previous provider was well aware of all of this.  He is needing his medical notes faxed over to his insurance company once we receive a specific form which we have not.  He reports a toe injury and would like this to be looked at I scheduled him for next Tuesday.  Diabetes medications refilled  Regarding Covid I reiterated CDC guidelines and return to work -10 days of isolation and/or symptom-free for 72 hours after last symptoms.  20 minutes spent on the date of the encounter doing chart review, history and exam, documentation and further activities as noted above         No follow-ups on file.    VEGA Cerda [facility]    Constantino Moe is a 60 year old who presents to clinic today for the following health issues     HPI   New Patient/Transfer of Care  Works at [employer] says uniforms were made from formaldehyde in this exacerbated his eczema.  He has an ongoing insurance claim related to this his previous provider was aware of  this.  Apparently insurance is faxed over a form to [employer] to have his records release?  Lives in a condo insurance claim against uniforms.   Has eczema -needs to wear cotton clothes.     Hypertension Follow-up      Do you check your blood pressure regularly outside of the clinic? Yes     Are you following a low salt diet? Yes    Are your blood pressures ever more than 140 on the top number (systolic) OR more   than 90 on the bottom number (diastolic), for example 140/90? No    Patient tested positive for COVID on 12/20/2020, no fever since Monday, complains of diarrhea and runny nose.  Would like to discuss returning to work on Monday.      Ran his toe into the wall last week. worried about staph infection     Review of Systems   Constitutional, HEENT, cardiovascular, pulmonary, GI, , musculoskeletal, neuro, skin, endocrine and psych systems are negative, except as otherwise noted.      Objective           Vitals:  No vitals were obtained today due to virtual visit.    Physical Exam   healthy, alert and no distress  PSYCH: Alert and oriented times 3; coherent speech, normal   rate and volume, able to articulate logical thoughts, able   to abstract reason, no tangential thoughts, no hallucinations   or delusions  His affect is normal  RESP: No cough, no audible wheezing, able to talk in full sentences  Remainder of exam unable to be completed due to telephone visits    Office Visit on 09/14/2020   Component Date Value Ref Range Status     Hemoglobin A1C 09/14/2020 6.3* 0 - 5.6 % Final    Comment: Normal <5.7% Prediabetes 5.7-6.4%  Diabetes 6.5% or higher - adopted from ADA   consensus guidelines.       Sodium 09/14/2020 131* 133 - 144 mmol/L Final     Potassium 09/14/2020 4.1  3.4 - 5.3 mmol/L Final     Chloride 09/14/2020 100  94 - 109 mmol/L Final     Carbon Dioxide 09/14/2020 25  20 - 32 mmol/L Final     Anion Gap 09/14/2020 7  3 - 14 mmol/L Final     Glucose 09/14/2020 103* 70 - 99 mg/dL Final     Urea  Nitrogen 09/14/2020 10  7 - 30 mg/dL Final     Creatinine 09/14/2020 0.88  0.66 - 1.25 mg/dL Final     GFR Estimate 09/14/2020 >90  >60 mL/min/[1.73_m2] Final    Comment: Non  GFR Calc  Starting 12/18/2018, serum creatinine based estimated GFR (eGFR) will be   calculated using the Chronic Kidney Disease Epidemiology Collaboration   (CKD-EPI) equation.       GFR Estimate If Black 09/14/2020 >90  >60 mL/min/[1.73_m2] Final    Comment:  GFR Calc  Starting 12/18/2018, serum creatinine based estimated GFR (eGFR) will be   calculated using the Chronic Kidney Disease Epidemiology Collaboration   (CKD-EPI) equation.       Calcium 09/14/2020 9.2  8.5 - 10.1 mg/dL Final     Creatinine Urine 09/14/2020 20  mg/dL Final     Albumin Urine mg/L 09/14/2020 36  mg/L Final     Albumin Urine mg/g Cr 09/14/2020 183.25* 0 - 17 mg/g Cr Final             Phone call duration: 12 minutes

## 2021-01-26 ENCOUNTER — OFFICE VISIT (OUTPATIENT)
Dept: FAMILY MEDICINE | Facility: CLINIC | Age: 61
End: 2021-01-26
Payer: COMMERCIAL

## 2021-01-26 VITALS
SYSTOLIC BLOOD PRESSURE: 162 MMHG | BODY MASS INDEX: 37.78 KG/M2 | DIASTOLIC BLOOD PRESSURE: 92 MMHG | HEART RATE: 78 BPM | TEMPERATURE: 97.8 F | WEIGHT: 258 LBS

## 2021-01-26 DIAGNOSIS — B35.1 ONYCHOMYCOSIS: ICD-10-CM

## 2021-01-26 DIAGNOSIS — S99.922A INJURY OF TOENAIL OF LEFT FOOT, INITIAL ENCOUNTER: ICD-10-CM

## 2021-01-26 DIAGNOSIS — E11.65 TYPE 2 DIABETES MELLITUS WITH HYPERGLYCEMIA, WITHOUT LONG-TERM CURRENT USE OF INSULIN (H): Primary | ICD-10-CM

## 2021-01-26 DIAGNOSIS — I10 HYPERTENSION GOAL BP (BLOOD PRESSURE) < 140/90: ICD-10-CM

## 2021-01-26 DIAGNOSIS — Z13.6 CARDIOVASCULAR SCREENING; LDL GOAL LESS THAN 100: ICD-10-CM

## 2021-01-26 LAB — HBA1C MFR BLD: 7.7 % (ref 0–5.6)

## 2021-01-26 PROCEDURE — 99214 OFFICE O/P EST MOD 30 MIN: CPT | Performed by: FAMILY MEDICINE

## 2021-01-26 PROCEDURE — 36415 COLL VENOUS BLD VENIPUNCTURE: CPT | Performed by: FAMILY MEDICINE

## 2021-01-26 PROCEDURE — 83036 HEMOGLOBIN GLYCOSYLATED A1C: CPT | Performed by: FAMILY MEDICINE

## 2021-01-26 RX ORDER — AMLODIPINE BESYLATE 5 MG/1
5 TABLET ORAL 2 TIMES DAILY
Qty: 180 TABLET | Refills: 0 | Status: SHIPPED | OUTPATIENT
Start: 2021-01-26 | End: 2021-08-02

## 2021-01-26 RX ORDER — TERBINAFINE HYDROCHLORIDE 250 MG/1
250 TABLET ORAL DAILY
Qty: 90 TABLET | Refills: 0 | Status: SHIPPED | OUTPATIENT
Start: 2021-01-26 | End: 2021-05-14

## 2021-01-26 RX ORDER — ASPIRIN 81 MG/1
162 TABLET, CHEWABLE ORAL DAILY
COMMUNITY

## 2021-01-26 RX ORDER — LISINOPRIL 20 MG/1
20 TABLET ORAL 2 TIMES DAILY
Qty: 180 TABLET | Refills: 3 | Status: SHIPPED | OUTPATIENT
Start: 2021-01-26 | End: 2022-07-18 | Stop reason: ALTCHOICE

## 2021-01-26 NOTE — PROGRESS NOTES
Assessment & Plan     Type 2 diabetes mellitus with hyperglycemia, without long-term current use of insulin (H)  - Uncontrolled  - Patient admits to missing his medication intermittently  - Advised re-starting metformin daily as prescribed   - Hemoglobin A1c    Onychomycosis  - Start terbinafine   - terbinafine (LAMISIL) 250 MG tablet; Take 1 tablet (250 mg) by mouth daily  - Hepatic panel (Albumin, ALT, AST, Bili, Alk Phos, TP); Future    Injury of toenail of left foot, initial encounter  - Toenail is actively falling off, offered to remove in clinic, but he's worried about cost  - Will continue to monitor and it should fall off on it's own within the next 1-2 weeks     Hypertension goal BP (blood pressure) < 140/90  - Uncontrolled  - Again, needs to take medication regularly   - lisinopril (ZESTRIL) 20 MG tablet; Take 1 tablet (20 mg) by mouth 2 times daily  - amLODIPine (NORVASC) 5 MG tablet; Take 1 tablet (5 mg) by mouth 2 times daily    CARDIOVASCULAR SCREENING; LDL GOAL LESS THAN 100  - Lipid panel reflex to direct LDL Non-fasting; Future    Return in about 4 weeks (around 2/23/2021) for BP Recheck.    Kyra Kauffman, DO  [facility]    ========================================================================    Subjective     Abhi is a 60 year old who presents to clinic today for the following health issues     HPI     Hypertension Follow-up      Do you check your blood pressure regularly outside of the clinic? No, but pt has a knock off apple watch that checks it    Are you following a low salt diet? Yes    Are your blood pressures ever more than 140 on the top number (systolic) OR more   than 90 on the bottom number (diastolic), for example 140/90? No   Currently prescribed lisinopril 20mg BID, but has only been taking it daily and then ran out the past few weeks.        How many servings of fruits and vegetables do you eat daily?  3-4    On average, how many sweetened  beverages do you drink each day (Examples: soda, juice, sweet tea, etc.  Do NOT count diet or artificially sweetened beverages)?   0    How many days per week do you exercise enough to make your heart beat faster? 7    How many minutes a day do you exercise enough to make your heart beat faster? 3 miles    How many days per week do you miss taking your medication? 0    Diabetes Follow-up      How often are you checking your blood sugar? Not at all    What concerns do you have today about your diabetes? None     Do you have any of these symptoms? (Select all that apply)  Weight gain    Currently taking metformin XR 500mg daily    Has noticed some weight gain and diet changes since having COVID last month.  Hasn't been exercising.  Also admits to not taking the metformin on a regular basis recently.      BP Readings from Last 2 Encounters:   01/26/21 (!) 162/92   11/17/20 (!) 150/90     Hemoglobin A1C (%)   Date Value   01/26/2021 7.7 (H)   09/14/2020 6.3 (H)     LDL Cholesterol Calculated (mg/dL)   Date Value   12/31/2019 129 (H)   08/23/2018 124 (H)     Patient would also like for me to look at his left big toe.  Jammed it on the wall about 3 weeks ago.  No longer painful, but the toenail looks like it's coming off.      Review of Systems   Constitutional, HEENT, cardiovascular, pulmonary, gi and gu systems are negative, except as otherwise noted.      Objective    BP (!) 162/92 (BP Location: Right arm, Patient Position: Chair, Cuff Size: Adult Large)   Pulse 78   Temp 97.8  F (36.6  C) (Oral)   Wt 117 kg (258 lb)   BMI 37.78 kg/m    Body mass index is 37.78 kg/m .  Physical Exam   GENERAL: healthy, alert and no distress  RESP: lungs clear to auscultation - no rales, rhonchi or wheezes  CV: regular rates and rhythm, normal S1 S2, no S3 or S4, no murmur, click or rub and no peripheral edema  SKIN: Onychomycosis on all toenails.  Left big toenail is avulsed and almost completely detached.  No signs of infection.      Results for orders placed or performed in visit on 01/26/21 (from the past 24 hour(s))   Hemoglobin A1c   Result Value Ref Range    Hemoglobin A1C 7.7 (H) 0 - 5.6 %

## 2021-02-26 ENCOUNTER — TELEPHONE (OUTPATIENT)
Dept: FAMILY MEDICINE | Facility: CLINIC | Age: 61
End: 2021-02-26

## 2021-02-26 NOTE — TELEPHONE ENCOUNTER
Forms received from [employer]/ 2/24/21 for Amanda Osorio.  Forms placed in provider 'sign me' folder.  Please fax forms to [phone] after completion.    DESTIN SPANNT (R)  Radiology Department

## 2021-03-11 ENCOUNTER — TELEPHONE (OUTPATIENT)
Dept: FAMILY MEDICINE | Facility: CLINIC | Age: 61
End: 2021-03-11

## 2021-03-24 ENCOUNTER — TELEPHONE (OUTPATIENT)
Dept: FAMILY MEDICINE | Facility: CLINIC | Age: 61
End: 2021-03-24

## 2021-03-24 NOTE — TELEPHONE ENCOUNTER
Forms received from [employer] [id number] (date 3/23/21) for Amanda Osorio.  Forms placed in provider 'sign me' folder.  Please fax forms to [phone] after completion.    Cesar CUNHA (R) (ARRT)  Radiology Dept

## 2021-03-25 ENCOUNTER — DOCUMENTATION ONLY (OUTPATIENT)
Dept: FAMILY MEDICINE | Facility: CLINIC | Age: 61
End: 2021-03-25

## 2021-03-25 NOTE — PROGRESS NOTES
Form received from [employer] claims [employer]. For period 12/2/2020 to forward,  Pt was last seen by me on 11/17/2020. Was given a note to be off until 11/30/20.  Since I have not seen pt. And Im not sure why this form need to be filled.  Form will not be filled.

## 2021-03-31 NOTE — TELEPHONE ENCOUNTER
Form was not filled out- see documentation only encounter 3/25/21. Letter written and faxed along with form back to Lucius @ [phone]    Nadine Sheffield MA     no

## 2021-05-07 DIAGNOSIS — B35.1 ONYCHOMYCOSIS: ICD-10-CM

## 2021-05-09 NOTE — TELEPHONE ENCOUNTER
Reviewing while PCP Sandrita Poole, ASHVIN is out of office.  Will route message to Dr. Carrero (former Jamari) who saw patient on 1/26/21 and had prescribed the Terbinafine.    Ines Monte, MILADIS, APRN, CNP

## 2021-05-10 NOTE — TELEPHONE ENCOUNTER
Needs labs done prior to refills.  Has future orders for a hepatic panel and lipids.  Please have him schedule a lab only visit.

## 2021-05-10 NOTE — TELEPHONE ENCOUNTER
Called patient and left a voicemail message to call the clinic and schedule a lab only appointment.  (does need to be fasting 8-10 hours)    Deidre Kennedy

## 2021-05-12 NOTE — TELEPHONE ENCOUNTER
Spoke to patient. Scheduled lab visit for 5/13 reminded him to fast for this appointment.    Meka Small/

## 2021-05-13 DIAGNOSIS — Z13.6 CARDIOVASCULAR SCREENING; LDL GOAL LESS THAN 100: ICD-10-CM

## 2021-05-13 DIAGNOSIS — B35.1 ONYCHOMYCOSIS: ICD-10-CM

## 2021-05-13 LAB
ALBUMIN SERPL-MCNC: 4.2 G/DL (ref 3.4–5)
ALP SERPL-CCNC: 64 U/L (ref 40–150)
ALT SERPL W P-5'-P-CCNC: 29 U/L (ref 0–70)
AST SERPL W P-5'-P-CCNC: 26 U/L (ref 0–45)
BILIRUB DIRECT SERPL-MCNC: 0.2 MG/DL (ref 0–0.2)
BILIRUB SERPL-MCNC: 1.1 MG/DL (ref 0.2–1.3)
CHOLEST SERPL-MCNC: 221 MG/DL
HDLC SERPL-MCNC: 60 MG/DL
LDLC SERPL CALC-MCNC: 142 MG/DL
NONHDLC SERPL-MCNC: 161 MG/DL
PROT SERPL-MCNC: 7.9 G/DL (ref 6.8–8.8)
TRIGL SERPL-MCNC: 97 MG/DL

## 2021-05-13 PROCEDURE — 80061 LIPID PANEL: CPT | Performed by: FAMILY MEDICINE

## 2021-05-13 PROCEDURE — 36415 COLL VENOUS BLD VENIPUNCTURE: CPT | Performed by: FAMILY MEDICINE

## 2021-05-13 PROCEDURE — 80076 HEPATIC FUNCTION PANEL: CPT | Performed by: FAMILY MEDICINE

## 2021-05-13 NOTE — TELEPHONE ENCOUNTER
RN left VM for patient to return call to clini    RN called to determine which medication he needed refilled    Eulalio Eden RN, BSN, PHN  [facility]

## 2021-05-13 NOTE — TELEPHONE ENCOUNTER
Reason for Call:  Other / Refill    Detailed comments: Patient was at the clinic for his lab appointment and requested a reminder sent to his team about his refill request. Patient stated he is out of med and since he already had lab today he would appreciate the refill approval ASAP.    Phone Number Patient can be reached at: Home number on file [phone] (home)    Best Time: ASAP    Can we leave a detailed message on this number? YES    Call taken on 5/13/2021 at 2:44 PM by Charlene Ovalles

## 2021-05-14 ENCOUNTER — TELEPHONE (OUTPATIENT)
Dept: FAMILY MEDICINE | Facility: CLINIC | Age: 61
End: 2021-05-14

## 2021-05-14 DIAGNOSIS — B35.1 ONYCHOMYCOSIS: ICD-10-CM

## 2021-05-14 RX ORDER — TERBINAFINE HYDROCHLORIDE 250 MG/1
250 TABLET ORAL DAILY
Qty: 90 TABLET | Refills: 0 | Status: SHIPPED | OUTPATIENT
Start: 2021-05-14 | End: 2021-05-26

## 2021-05-14 NOTE — TELEPHONE ENCOUNTER
Routing to Dr. Carrero- see phone call.    Pt completed lab work. Mesg left for him to schedule clinic visit to discuss.    Rx pending approval     Tata Dalton RN

## 2021-05-14 NOTE — TELEPHONE ENCOUNTER
Prior Authorization Retail Medication Request    Medication/Dose: terbinafine (LAMISIL) 250 MG tablet  ICD code (if different than what is on RX):  unknown  Previously Tried and Failed:  unknown  Rationale:  unknown    Insurance Name:  unknown  Insurance ID:  unknown      Pharmacy Information (if different than what is on RX)  Name:  [facility]  Phone:  [phone]

## 2021-05-14 NOTE — TELEPHONE ENCOUNTER
Attempted to reach pt, no answer, left veena to return call to [facility] at [phone] to review Dr. Carrero message below.    Team - please call patient with results.  Cholesterol is elevated and he should start a medication for this.  He is also overdue for follow up for his diabetes.  Please assist with scheduling an office visit.     The 10-year ASCVD risk score (Savannahjd BEE Jr., et al., 2013) is: 25.9%    Values used to calculate the score:      Age: 60 years      Sex: Male      Is Non- : No      Diabetic: Yes      Tobacco smoker: No      Systolic Blood Pressure: 162 mmHg      Is BP treated: Yes      HDL Cholesterol: 60 mg/dL      Total Cholesterol: 221 mg/dL    Tata Dalton RN

## 2021-05-17 NOTE — TELEPHONE ENCOUNTER
RN called patient and relayed provider's message. Patient verbalized understanding.     Patient scheduled for follow up visit, asked to schedule with Dr. Osorio. Will discuss statin Rx at visit.     Perla Sheffield RN, BSN, PHN  [facility]: [place]

## 2021-05-18 NOTE — TELEPHONE ENCOUNTER
Central Prior Authorization Team   Phone: [phone]      PA Initiation    Medication: terbinafine (LAMISIL) 250 MG tablet-Initiated  Insurance Company: [employer] ([facility]) - Phone [phone] Fax [phone]  Pharmacy Filling the Rx: [facility] - [place], [place] - [address] [place] [address]  Filling Pharmacy Phone: [phone]  Filling Pharmacy Fax:    Start Date: 5/18/2021

## 2021-05-18 NOTE — TELEPHONE ENCOUNTER
PRIOR AUTHORIZATION DENIED    Medication: terbinafine (LAMISIL) 250 MG tablet-DENIED    Denial Date: 5/18/2021    Denial Rational: Insurance only covers 90 days of medication per year when used to treat fungal nail infection.          Appeal Information:

## 2021-05-19 NOTE — TELEPHONE ENCOUNTER
Left message on answering machine for patient to call back. He will have to pay out of pocket for Lamasil if he still needs it.    Nadine Sheffield MA

## 2021-05-19 NOTE — TELEPHONE ENCOUNTER
Reviewed - no, I was just filling for PCP in her absence. Patient already did 90 this year and his insurance won't cover over 90. If his nails are cleared, he can be done with the med. If they are not cleared, he can do the additional 90 but would have to pay cash out of pocket for them.    Thanks.  Cesar Alcala, MPAS, PA-C

## 2021-05-19 NOTE — TELEPHONE ENCOUNTER
See message below- is this something you want to appeal? Looks like insurance will only allow #90/year      Nadine Sheffield MA

## 2021-05-20 NOTE — TELEPHONE ENCOUNTER
Called and left detailed message for patient with message below. Asked that he call clinic back if he has any questions or concerns.    Nadine Sheffield MA

## 2021-05-26 ENCOUNTER — OFFICE VISIT (OUTPATIENT)
Dept: FAMILY MEDICINE | Facility: CLINIC | Age: 61
End: 2021-05-26
Payer: COMMERCIAL

## 2021-05-26 VITALS
SYSTOLIC BLOOD PRESSURE: 134 MMHG | OXYGEN SATURATION: 98 % | BODY MASS INDEX: 36.2 KG/M2 | WEIGHT: 244.4 LBS | TEMPERATURE: 97.6 F | DIASTOLIC BLOOD PRESSURE: 78 MMHG | HEIGHT: 69 IN | HEART RATE: 89 BPM | RESPIRATION RATE: 22 BRPM

## 2021-05-26 DIAGNOSIS — E11.9 TYPE 2 DIABETES MELLITUS WITHOUT COMPLICATION, WITHOUT LONG-TERM CURRENT USE OF INSULIN (H): Primary | ICD-10-CM

## 2021-05-26 DIAGNOSIS — B35.1 ONYCHOMYCOSIS OF TOENAIL: ICD-10-CM

## 2021-05-26 DIAGNOSIS — E66.01 MORBID OBESITY (H): ICD-10-CM

## 2021-05-26 DIAGNOSIS — I10 HYPERTENSION GOAL BP (BLOOD PRESSURE) < 140/90: ICD-10-CM

## 2021-05-26 DIAGNOSIS — E78.5 DYSLIPIDEMIA: ICD-10-CM

## 2021-05-26 PROCEDURE — 99214 OFFICE O/P EST MOD 30 MIN: CPT | Performed by: FAMILY MEDICINE

## 2021-05-26 ASSESSMENT — PAIN SCALES - GENERAL: PAINLEVEL: NO PAIN (0)

## 2021-05-26 ASSESSMENT — MIFFLIN-ST. JEOR: SCORE: 1913.58

## 2021-05-26 NOTE — PROGRESS NOTES
"    Assessment & Plan     Type 2 diabetes mellitus without complication, without long-term current use of insulin (H)  Stable, last hemoglobin A1c was at 7.7.  Continue with diet, exercise, weight loss, continue with Metformin xr one tab daily  Follow-up in 2 to 4 months for an annual physical exam.    Morbid obesity (H)  Dyslipidemia    Advised diet, daily exercise, weight loss.  Reviewed his lipid panel with him from May 13, 2021.  Advised patient to start Lipitor or other statin.  However, at this point he is waiting to try neutral product before he could commit to statin.    Hypertension goal BP (blood pressure) < 140/90  Stable continue with current medication.    Onychomycosis of toenail  Completed 12 weeks of Lamisil.  Toenails are clearing up.  Advised patient with good hygiene,        BMI:   Estimated body mass index is 35.79 kg/m  as calculated from the following:    Height as of this encounter: 1.76 m (5' 9.29\").    Weight as of this encounter: 110.9 kg (244 lb 6.4 oz).   Weight management plan: Discussed healthy diet and exercise guidelines    Work on weight loss  Regular exercise    Return in about 4 months (around 9/26/2021) for Routine preventive.    Amanda Osorio MD  [facility]    Constantino Moe is a 60 year old who presents for the following health issues:  History of metabolic syndrome which consisting of diabetes, dyslipidemia, hypertension, morbid obesity.  His diabetes been good control last hemoglobin A1c was at 7.7.  He is on Metformin XR 1 tablet daily.  He has been working out and he had lost some weight.    History of hypertension been stable.    History of onychomycosis he had completed 12 weeks of Lamisil.  Toenail looks a lot better and they are clearing.    Diabetes Follow-up      How often are you checking your blood sugar? Not at all    What concerns do you have today about your diabetes? Other: medications questions     Do you have any of these " symptoms? (Select all that apply)  No numbness or tingling in feet.  No redness, sores or blisters on feet.  No complaints of excessive thirst.  No reports of blurry vision.  No significant changes to weight.    Have you had a diabetic eye exam in the last 12 months? No        BP Readings from Last 2 Encounters:   01/26/21 (!) 162/92   11/17/20 (!) 150/90     Hemoglobin A1C (%)   Date Value   01/26/2021 7.7 (H)   09/14/2020 6.3 (H)     LDL Cholesterol Calculated (mg/dL)   Date Value   05/13/2021 142 (H)   12/31/2019 129 (H)                 How many servings of fruits and vegetables do you eat daily?  4 or more    On average, how many sweetened beverages do you drink each day (Examples: soda, juice, sweet tea, etc.  Do NOT count diet or artificially sweetened beverages)?   0    How many days per week do you exercise enough to make your heart beat faster? 3 or less    How many minutes a day do you exercise enough to make your heart beat faster? 30 - 60    How many days per week do you miss taking your medication? 0    Review of Systems   Constitutional, HEENT, cardiovascular, pulmonary, gi and gu systems are negative, except as otherwise noted.      Objective    There were no vitals taken for this visit.  There is no height or weight on file to calculate BMI.  Physical Exam   GENERAL: healthy, alert and no distress  SKIN: toenails exam: clear nails at base.    Amanda Osorio MD

## 2021-05-31 ENCOUNTER — VIRTUAL VISIT (OUTPATIENT)
Dept: URGENT CARE | Facility: CLINIC | Age: 61
End: 2021-05-31
Payer: COMMERCIAL

## 2021-05-31 ENCOUNTER — NURSE TRIAGE (OUTPATIENT)
Dept: NURSING | Facility: CLINIC | Age: 61
End: 2021-05-31

## 2021-05-31 DIAGNOSIS — Z53.9 NO SHOW: Primary | ICD-10-CM

## 2021-05-31 NOTE — PROGRESS NOTES
Left message for patient 3 x for virtual telephone visit. Advised patient to reschedule or be evaluated in person.      Per chart review, Patient believes his finger is infected on his right hand. 3rd and fourth fingers where he has chronic eczema. Skin is red and slightly swollen  around  breaks in skin. Has been using a topical antibiotic without change. No fever; no red streaking from wound. He states he had a severe infection  6 -10 months ago and  had to be on  2  antibiotics to clear it up and he would like oral antibiotic. He has a history of type 2 diabetes, last A1C was 7.7 on 1/26/21. Patient was evaluated 11/17/20 and prescribed 10 days of Bactrim for cellulitis of his finger after completing doxycycline. He is allergic to penicillins.     Problem list, Medication list, Allergies, and Medical history reviewed in [facility].

## 2021-05-31 NOTE — TELEPHONE ENCOUNTER
Caller fears he has  Infection on his on right  3rd an fourth fingers where he  has  chronic eczema . Skin is red and slightly swollen  around  breaks in skin. Has been using a topical antibiotic without change. No fever; no red streaking from wound.   Caller  states he had a severe infection from same  6 -10 months ago and  had to be on  2  antibiotics to clear it up.   Requesting phone order for  antibiotic today.  Triage protocol reviewed.   1:47 PM  Call cut off during triage; left VMM to call ba ck.  2:00 PM  No answer  2:33 PM  Call back from patient;  states he cannot be seen tomorrow   Offered V V with on call  UC provider today  and patient agrees  Call transferred to    Bree Pemberton RN  FNA            Bree Pemberton RN  FNA         Reason for Disposition    [1] Small red streak or spreading redness (<2 inches; 5 cm) AND [2] no fever    Additional Information    Negative: [1] Red area or streak AND [2] fever    Negative: [1] Looks infected (spreading redness, pus) AND [2] large red area (> 2 in. or 5 cm)    Negative: [1] Looks infected (spreading redness, pus) AND [2] diabetes mellitus or weak immune system (e.g., HIV positive, cancer chemo, splenectomy, organ transplant, chronic steroids)    Negative: [1] Red streak runs from sore AND [2] longer than 1 inch (2.5 cm)    Negative: [1] Ulcer with black scab AND [2] spreading AND [3] painless AND [4] cause unknown    Protocols used: SORES-A-

## 2021-06-02 ENCOUNTER — NURSE TRIAGE (OUTPATIENT)
Dept: FAMILY MEDICINE | Facility: CLINIC | Age: 61
End: 2021-06-02

## 2021-06-02 DIAGNOSIS — L03.019 PARONYCHIA OF FINGER, UNSPECIFIED LATERALITY: Primary | ICD-10-CM

## 2021-06-02 RX ORDER — CEPHALEXIN 500 MG/1
500 CAPSULE ORAL 2 TIMES DAILY
Qty: 20 CAPSULE | Refills: 0 | Status: SHIPPED | OUTPATIENT
Start: 2021-06-02 | End: 2021-09-07

## 2021-06-02 NOTE — TELEPHONE ENCOUNTER
I have sent a new medications,  Keflex for possible, Paronychia infections,   Bid for 10 days  If it dose not clear in 48 to 72 hours, or get worse, then need to be seen  thanks

## 2021-06-02 NOTE — TELEPHONE ENCOUNTER
Called and notified pt. He will  the rx and let us know if it does not clear up in 48-72 hours or if worsens.    Tata Dalton RN

## 2021-06-02 NOTE — TELEPHONE ENCOUNTER
Patient complains of potential cellulitis infection of third and fourth finger on right hand.     Patient triaged over weekend and missed scheduled virtual UC appointment.     Patient states that his fingers are now red and mildly painful.     He states Dr. Osorio treated him October 2020 for same issue. At that point, the infection had turned into a staff infection.     He is hoping for immediate treatment in order to avoid worsening infection.     Patient last seen 5/26/21 with Dr. Osorio.     He wonders if Dr. Osorio would be willing to squeeze into schedule this afternoon, or agreeable to starting antibiotic treatment today with follow up visit tomorrow?     Routing to Dr. Osorio to review and advise.     Perla Sheffield RN, BSN, PHN  [facility]: [place]

## 2021-06-14 NOTE — TELEPHONE ENCOUNTER
Coronavirus (COVID-19) Notification    Reason for call  Notify of POSITIVE  COVID-19 lab result, assess symptoms,  review [facility] recommendations    Lab Result   Lab test for 2019-nCoV rRt-PCR or SARS-COV-2 PCR  Oropharyngeal AND/OR nasopharyngeal swabs were POSITIVE for 2019-nCoV RNA [OR] SARS-COV-2 RNA (COVID-19) RNA     We have been unable to reach Patient by phone at this time to notify of their Positive COVID-19 result.  Left voicemail message requesting a call back to [phone] [facility] for results.        POSITIVE COVID-19 Letter sent.    Chayito García LPN

## 2021-08-02 DIAGNOSIS — I10 HYPERTENSION GOAL BP (BLOOD PRESSURE) < 140/90: ICD-10-CM

## 2021-08-02 RX ORDER — AMLODIPINE BESYLATE 5 MG/1
5 TABLET ORAL 2 TIMES DAILY
Qty: 180 TABLET | Refills: 0 | Status: SHIPPED | OUTPATIENT
Start: 2021-08-02 | End: 2021-09-29

## 2021-09-03 ENCOUNTER — TELEPHONE (OUTPATIENT)
Dept: FAMILY MEDICINE | Facility: CLINIC | Age: 61
End: 2021-09-03

## 2021-09-03 NOTE — TELEPHONE ENCOUNTER
Reason for Call:  Medication or medication refill:    Do you use a [facility]?  Name of the pharmacy and phone number for the current request:  [facility]    Name of the medication requested: cephALEXin (KEFLEX) 500 MG capsule  Take 1 capsule (500 mg) by mouth 2 times daily       Other request: Would like as soon as possible    Can we leave a detailed message on this number? Yes    Phone number patient can be reached at: Home number on file [phone] (home)    Best Time: Anytime    Call taken on 9/3/2021 at 5:08 PM by Juliane Watson

## 2021-09-07 ENCOUNTER — OFFICE VISIT (OUTPATIENT)
Dept: FAMILY MEDICINE | Facility: CLINIC | Age: 61
End: 2021-09-07
Payer: COMMERCIAL

## 2021-09-07 VITALS
RESPIRATION RATE: 21 BRPM | HEIGHT: 69 IN | WEIGHT: 249.4 LBS | DIASTOLIC BLOOD PRESSURE: 86 MMHG | TEMPERATURE: 98 F | SYSTOLIC BLOOD PRESSURE: 136 MMHG | HEART RATE: 84 BPM | OXYGEN SATURATION: 99 % | BODY MASS INDEX: 36.94 KG/M2

## 2021-09-07 DIAGNOSIS — L30.9 ECZEMA, UNSPECIFIED TYPE: ICD-10-CM

## 2021-09-07 DIAGNOSIS — E11.9 TYPE 2 DIABETES MELLITUS WITHOUT COMPLICATION, WITHOUT LONG-TERM CURRENT USE OF INSULIN (H): ICD-10-CM

## 2021-09-07 DIAGNOSIS — L03.011 CELLULITIS OF FINGER OF RIGHT HAND: Primary | ICD-10-CM

## 2021-09-07 PROCEDURE — 99214 OFFICE O/P EST MOD 30 MIN: CPT | Performed by: FAMILY MEDICINE

## 2021-09-07 RX ORDER — CLOBETASOL PROPIONATE 0.5 MG/G
1 OINTMENT TOPICAL DAILY
Qty: 60 G | Refills: 11 | Status: SHIPPED | OUTPATIENT
Start: 2021-09-07 | End: 2022-07-18

## 2021-09-07 RX ORDER — DOXYCYCLINE 100 MG/1
100 CAPSULE ORAL 2 TIMES DAILY
Qty: 20 CAPSULE | Refills: 0 | Status: SHIPPED | OUTPATIENT
Start: 2021-09-07 | End: 2021-09-29

## 2021-09-07 ASSESSMENT — MIFFLIN-ST. JEOR: SCORE: 1930.53

## 2021-09-07 NOTE — TELEPHONE ENCOUNTER
"Called patient, fingers are swollen, red, somewhat painful again    Ring finger and middle finger of right hand has been becoming increasingly \"infected\".  Last course of antibiotics were keflex in June 2021.    He states that his dentist told him he might have eczema and to try a medication called \"dupixin\"?     Scheduled an appointment for patient to be seen today in person to have his fingers assessed.        Ct Marquez RN  "

## 2021-09-07 NOTE — PROGRESS NOTES
Assessment & Plan     Cellulitis of finger of right hand  Keep clean and dry  Avoid rubbing sking  - doxycycline hyclate (VIBRAMYCIN) 100 MG capsule; Take 1 capsule (100 mg) by mouth 2 times daily    Eczema, unspecified type  Continue with daily moisturizer.   - clobetasol (TEMOVATE) 0.05 % external ointment; Apply 1 g topically daily Thin layer    Type 2 diabetes mellitus without complication, without long-term current use of insulin (H)  Been stable, continue with Metformin,  Will follow up in 3 weeks for labs      No follow-ups on file.    Amanda Osorio MD  [facility]    Constantino Moe is a 61 year old who presents for the following health issues:  History of eczema, right forearm, right finger especially middle and ring fingers.  For the past few weeks been more red, more inflamed with discharge.  No fever, no chills.    History of diabetes.  He reports blood sugar has been well controlled.  No low blood sugar      Objective    There were no vitals taken for this visit.  There is no height or weight on file to calculate BMI.  Physical Exam   GENERAL: healthy, alert and no distress  SKIN: dry,scaling skin right forearm  Right middle and ring finger, cracked and red, warm to touch,  No discharges and no bleeding  PSYCH: mentation appears normal, affect normal/bright      Amanda Osorio MD

## 2021-09-29 ENCOUNTER — TELEPHONE (OUTPATIENT)
Dept: FAMILY MEDICINE | Facility: CLINIC | Age: 61
End: 2021-09-29

## 2021-09-29 ENCOUNTER — OFFICE VISIT (OUTPATIENT)
Dept: FAMILY MEDICINE | Facility: CLINIC | Age: 61
End: 2021-09-29
Payer: COMMERCIAL

## 2021-09-29 VITALS
SYSTOLIC BLOOD PRESSURE: 136 MMHG | HEART RATE: 89 BPM | OXYGEN SATURATION: 96 % | TEMPERATURE: 97.5 F | RESPIRATION RATE: 26 BRPM | BODY MASS INDEX: 35.78 KG/M2 | DIASTOLIC BLOOD PRESSURE: 82 MMHG | WEIGHT: 244 LBS

## 2021-09-29 DIAGNOSIS — Z12.5 SCREENING FOR PROSTATE CANCER: ICD-10-CM

## 2021-09-29 DIAGNOSIS — E11.9 TYPE 2 DIABETES MELLITUS WITHOUT COMPLICATION, WITHOUT LONG-TERM CURRENT USE OF INSULIN (H): Primary | ICD-10-CM

## 2021-09-29 DIAGNOSIS — E11.9 TYPE 2 DIABETES MELLITUS WITHOUT COMPLICATION, WITHOUT LONG-TERM CURRENT USE OF INSULIN (H): ICD-10-CM

## 2021-09-29 DIAGNOSIS — Z12.11 SCREEN FOR COLON CANCER: ICD-10-CM

## 2021-09-29 DIAGNOSIS — I10 HYPERTENSION GOAL BP (BLOOD PRESSURE) < 140/90: ICD-10-CM

## 2021-09-29 LAB — HBA1C MFR BLD: 6.4 % (ref 0–5.6)

## 2021-09-29 PROCEDURE — 80048 BASIC METABOLIC PNL TOTAL CA: CPT | Performed by: FAMILY MEDICINE

## 2021-09-29 PROCEDURE — G0103 PSA SCREENING: HCPCS | Performed by: FAMILY MEDICINE

## 2021-09-29 PROCEDURE — 99214 OFFICE O/P EST MOD 30 MIN: CPT | Performed by: FAMILY MEDICINE

## 2021-09-29 PROCEDURE — 36415 COLL VENOUS BLD VENIPUNCTURE: CPT | Performed by: FAMILY MEDICINE

## 2021-09-29 PROCEDURE — 82043 UR ALBUMIN QUANTITATIVE: CPT | Performed by: FAMILY MEDICINE

## 2021-09-29 PROCEDURE — 83036 HEMOGLOBIN GLYCOSYLATED A1C: CPT | Performed by: FAMILY MEDICINE

## 2021-09-29 RX ORDER — FLASH GLUCOSE SENSOR
KIT MISCELLANEOUS
Qty: 2 EACH | Refills: 11 | Status: SHIPPED | OUTPATIENT
Start: 2021-09-29 | End: 2023-09-05

## 2021-09-29 RX ORDER — FLASH GLUCOSE SENSOR
KIT MISCELLANEOUS
Qty: 14 EACH | Refills: 1 | Status: SHIPPED | OUTPATIENT
Start: 2021-09-29 | End: 2022-07-18

## 2021-09-29 RX ORDER — FLASH GLUCOSE SCANNING READER
EACH MISCELLANEOUS
Qty: 1 EACH | Refills: 0 | Status: CANCELLED | OUTPATIENT
Start: 2021-09-29

## 2021-09-29 RX ORDER — FLASH GLUCOSE SCANNING READER
EACH MISCELLANEOUS
Qty: 1 EACH | Refills: 0 | Status: SHIPPED | OUTPATIENT
Start: 2021-09-29 | End: 2024-01-16

## 2021-09-29 RX ORDER — AMLODIPINE BESYLATE 5 MG/1
5 TABLET ORAL 2 TIMES DAILY
Qty: 180 TABLET | Refills: 3 | Status: SHIPPED | OUTPATIENT
Start: 2021-09-29 | End: 2022-07-18

## 2021-09-29 ASSESSMENT — PAIN SCALES - GENERAL: PAINLEVEL: NO PAIN (0)

## 2021-09-29 NOTE — LETTER
October 4, 2021      Abhi Pabon  [address] [place] [address]        Dear ,    A1c is in a good range   Continue with Metformin, one tab daily.    Sodium on low side   Please cut water intake to 64 oz a day.    PSA is normal ( Prostate cancer screening )   ( not sure if any of your supplements, causes decline in your Sodium )     Please follow up in 6 months   Have a good day.     Amanda Osorio MD      Resulted Orders   HEMOGLOBIN A1C   Result Value Ref Range    Hemoglobin A1C 6.4 (H) 0.0 - 5.6 %      Comment:      Normal <5.7%   Prediabetes 5.7-6.4%    Diabetes 6.5% or higher     Note: Adopted from ADA consensus guidelines.   BASIC METABOLIC PANEL   Result Value Ref Range    Sodium 130 (L) 133 - 144 mmol/L    Potassium 4.1 3.4 - 5.3 mmol/L    Chloride 101 94 - 109 mmol/L    Carbon Dioxide (CO2) 25 20 - 32 mmol/L    Anion Gap 4 3 - 14 mmol/L    Urea Nitrogen 13 7 - 30 mg/dL    Creatinine 0.97 0.66 - 1.25 mg/dL    Calcium 9.0 8.5 - 10.1 mg/dL    Glucose 96 70 - 99 mg/dL    GFR Estimate 84 >60 mL/min/1.73m2      Comment:      As of July 11, 2021, eGFR is calculated by the CKD-EPI creatinine equation, without race adjustment. eGFR can be influenced by muscle mass, exercise, and diet. The reported eGFR is an estimation only and is only applicable if the renal function is stable.   Albumin Random Urine Quantitative with Creat Ratio   Result Value Ref Range    Creatinine Urine mg/dL 132 mg/dL    Albumin Urine mg/L 239 mg/L    Albumin Urine mg/g Cr 181.06 (H) 0.00 - 17.00 mg/g Cr   PSA, screen   Result Value Ref Range    Prostate Specific Antigen Screen 0.50 0.00 - 4.00 ug/L       If you have any questions or concerns, please call the clinic at the number listed above.

## 2021-09-29 NOTE — PROGRESS NOTES
Assessment & Plan     Type 2 diabetes mellitus without complication, without long-term current use of insulin (H)  Continue with current medications  Advised with diet and weight loss  - HEMOGLOBIN A1C; Future  - BASIC METABOLIC PANEL; Future  - Albumin Random Urine Quantitative with Creat Ratio; Future  - OPTOMETRY REFERRAL; Future  - Continuous Blood Gluc  (FREESTYLE ELFEGO 14 DAY READER) BREANNA; Use to read blood sugars as per 's instructions.  - Continuous Blood Gluc Sensor (FREESTYLE ELFEGO 14 DAY SENSOR) MISC; Change every 14 days.  - continuous blood glucose monitoring (FREESTYLE ELFEGO) sensor; For use with Freestyle Elfego Flash  for continuous monitioring of blood glucose levels. Replace sensor every 10 days.  - HEMOGLOBIN A1C  - BASIC METABOLIC PANEL  - Albumin Random Urine Quantitative with Creat Ratio    Screen for colon cancer  screening  - Adult Gastro Ref - Procedure Only; Future    Hypertension goal BP (blood pressure) < 140/90  Stable, continue with current medications  - amLODIPine (NORVASC) 5 MG tablet; Take 1 tablet (5 mg) by mouth 2 times daily    Screening for prostate cancer  screening  - PSA, screen; Future  - PSA, screen      Work on weight loss  Regular exercise    No follow-ups on file.    Amanda Osorio MD  [facility]    Constantino Moe is a 61 year old who presents for the following health issues; history of diabetes, hypertension.  Diabetes, blood pressure well controlled.  Currently on Metformin.  Has been working on his weight, diet has lost over 10 pounds.  Has no low blood sugar.  Denies lower extremity edema.    Diabetes Follow-up      How often are you checking your blood sugar? Not at all    What concerns do you have today about your diabetes? None and Other: A1C     Do you have any of these symptoms? (Select all that apply)  No numbness or tingling in feet.  No redness, sores or blisters on feet.  No complaints of  excessive thirst.  No reports of blurry vision.  No significant changes to weight.    Have you had a diabetic eye exam in the last 12 months? No        BP Readings from Last 2 Encounters:   09/07/21 136/86   05/26/21 134/78     Hemoglobin A1C (%)   Date Value   01/26/2021 7.7 (H)   09/14/2020 6.3 (H)     LDL Cholesterol Calculated (mg/dL)   Date Value   05/13/2021 142 (H)   12/31/2019 129 (H)                 How many servings of fruits and vegetables do you eat daily?  4 or more    On average, how many sweetened beverages do you drink each day (Examples: soda, juice, sweet tea, etc.  Do NOT count diet or artificially sweetened beverages)?   0    How many days per week do you exercise enough to make your heart beat faster? 5    How many minutes a day do you exercise enough to make your heart beat faster? 30 - 60    How many days per week do you miss taking your medication? 0    Review of Systems   Constitutional, HEENT, cardiovascular, pulmonary, GI, , musculoskeletal, neuro, skin, endocrine and psych systems are negative, except as otherwise noted.      Objective    There were no vitals taken for this visit.  There is no height or weight on file to calculate BMI.  Physical Exam   GENERAL: healthy, alert and no distress  MS: no gross musculoskeletal defects noted, no edema  NEURO: Normal strength and tone, mentation intact and speech normal  PSYCH: mentation appears normal, affect normal/bright    Orders Placed This Encounter   Procedures     REVIEW OF HEALTH MAINTENANCE PROTOCOL ORDERS     HEMOGLOBIN A1C     BASIC METABOLIC PANEL     Albumin Random Urine Quantitative with Creat Ratio     PSA, screen     OPTOMETRY REFERRAL     Adult Gastro Ref - Procedure Only       Amanda Osorio MD

## 2021-09-29 NOTE — TELEPHONE ENCOUNTER
Prior Authorization Retail Medication Request    Medication/Dose: Freestyle Elfego [employer]  ICD code (if different than what is on RX):    Previously Tried and Failed:  None  Rationale:      Insurance Name:  [facility] [employer]   Insurance ID:  [id number]      Pharmacy Information (if different than what is on RX)  Name:  [facility] [place]  Phone:  [phone]        PA Required call [phone]  Plan Exclusion  Drug Requires Prior Authorization        If any questions please contact the site directly.    Thank you,  Emma Beaulieu [facility] Float Technician

## 2021-09-30 LAB
ANION GAP SERPL CALCULATED.3IONS-SCNC: 4 MMOL/L (ref 3–14)
BUN SERPL-MCNC: 13 MG/DL (ref 7–30)
CALCIUM SERPL-MCNC: 9 MG/DL (ref 8.5–10.1)
CHLORIDE BLD-SCNC: 101 MMOL/L (ref 94–109)
CO2 SERPL-SCNC: 25 MMOL/L (ref 20–32)
CREAT SERPL-MCNC: 0.97 MG/DL (ref 0.66–1.25)
GFR SERPL CREATININE-BSD FRML MDRD: 84 ML/MIN/1.73M2
GLUCOSE BLD-MCNC: 96 MG/DL (ref 70–99)
POTASSIUM BLD-SCNC: 4.1 MMOL/L (ref 3.4–5.3)
PSA SERPL-MCNC: 0.5 UG/L (ref 0–4)
SODIUM SERPL-SCNC: 130 MMOL/L (ref 133–144)

## 2021-10-02 LAB
CREAT UR-MCNC: 132 MG/DL
MICROALBUMIN UR-MCNC: 239 MG/L
MICROALBUMIN/CREAT UR: 181.06 MG/G CR (ref 0–17)

## 2022-02-23 ENCOUNTER — TELEPHONE (OUTPATIENT)
Dept: FAMILY MEDICINE | Facility: CLINIC | Age: 62
End: 2022-02-23
Payer: COMMERCIAL

## 2022-02-23 DIAGNOSIS — L03.011 CELLULITIS OF FINGER OF RIGHT HAND: ICD-10-CM

## 2022-02-23 RX ORDER — DOXYCYCLINE 100 MG/1
100 CAPSULE ORAL 2 TIMES DAILY
Qty: 20 CAPSULE | Refills: 0 | OUTPATIENT
Start: 2022-02-23

## 2022-02-23 NOTE — TELEPHONE ENCOUNTER
[facility] faxed a Refill Request    doxycycline hyclate (VIBRAMYCIN) 100 MG capsule  DISCONTINUED        Last Written Prescription Date:  9/7/2021  Last Fill Quantity: 20 capsule,   # refills: 0  Last Office Visit: 9/29/2021 JOANA Lopez    Future Office visit:       Routing refill request to provider for review/approval because:  Drug not active on patient's medication list

## 2022-02-23 NOTE — TELEPHONE ENCOUNTER
"Pt was prescribed this last 9/7/21 for \"Cellulitis of finger of right hand\"    Pt would need follow up visit with provider for refill for reassessment if still having issues. Pharmacy notified.    Tata Dalton RN      "

## 2022-02-26 ENCOUNTER — NURSE TRIAGE (OUTPATIENT)
Dept: NURSING | Facility: CLINIC | Age: 62
End: 2022-02-26
Payer: COMMERCIAL

## 2022-02-26 NOTE — TELEPHONE ENCOUNTER
Eczema on fingers. Think it's infected.  He was asking for an antibiotic.  Has happened in the past.  Ran out of his clobetasol ointment.  When I checked this out I found that 11 refills were also ordered for this when originally ordered on 9/7/21. I told Abhi he has refills. He wasn't aware of the refills.       I recommended Urgent Care.  He stated has difficulty walking from the parking lot to the building.  I recommended MyChart or virtual telephone visit. He hasn't been able to access MyChart.   Caller stated understanding and agreement.  I transferred him to scheduling to make appointment for virtual telephone visit.        Reason for Disposition    [1] Looks infected (spreading redness, pus) AND [2] no fever    Additional Information    Negative: [1] Sudden onset of rash (within last 2 hours) AND [2] difficulty with breathing or swallowing    Negative: Sounds like a life-threatening emergency to the triager    Protocols used: RASH OR REDNESS - [id number]    Maria Isabel ADAM RN [facility]

## 2022-02-28 ENCOUNTER — OFFICE VISIT (OUTPATIENT)
Dept: FAMILY MEDICINE | Facility: CLINIC | Age: 62
End: 2022-02-28
Payer: COMMERCIAL

## 2022-02-28 VITALS
OXYGEN SATURATION: 99 % | BODY MASS INDEX: 35.9 KG/M2 | SYSTOLIC BLOOD PRESSURE: 162 MMHG | TEMPERATURE: 96.1 F | HEIGHT: 70 IN | HEART RATE: 97 BPM | RESPIRATION RATE: 14 BRPM | DIASTOLIC BLOOD PRESSURE: 92 MMHG | WEIGHT: 250.8 LBS

## 2022-02-28 DIAGNOSIS — L03.119 CELLULITIS OF HAND: Primary | ICD-10-CM

## 2022-02-28 PROCEDURE — 99213 OFFICE O/P EST LOW 20 MIN: CPT | Performed by: PHYSICIAN ASSISTANT

## 2022-02-28 RX ORDER — CEPHALEXIN 500 MG/1
500 CAPSULE ORAL 4 TIMES DAILY
Qty: 40 CAPSULE | Refills: 0 | Status: SHIPPED | OUTPATIENT
Start: 2022-02-28 | End: 2022-03-10

## 2022-02-28 ASSESSMENT — PAIN SCALES - GENERAL: PAINLEVEL: NO PAIN (0)

## 2022-02-28 NOTE — PROGRESS NOTES
"  Assessment & Plan   Problem List Items Addressed This Visit     None      Visit Diagnoses     Cellulitis of hand    -  Primary    Relevant Medications    cephALEXin (KEFLEX) 500 MG capsule        It is my impression based on the historical events and the physical exam that this is cellulitis secondary to chronic wounds from eczema.  I do not believe the patient has underlying osteomyelitis, septic joint, tenosynovitis, abscess, or necrotizing fasciitis.  Clinically patient does not meet sepsis criteria.  Appears well and non-toxic and I have low suspicion for systemic illness. Will discharge with cephalexin course and follow up with primary or me in 3-5 days prn.     Complete history and physical exam as below. AF with normal VS except for elevated bp, which they will monitor at home and contact us if >140/90mmHg greater than 50% of the time.    DDx and Dx discussed with and explained to the pt to their satisfaction.  All questions were answered at this time. Pt expressed understanding of and agreement with this dx, tx, and plan. No further workup warranted and standard medication warnings given. I have given the patient a list of pertinent indications for re-evaluation. Will go to the Emergency Department if symptoms worsen or new concerning symptoms arise. Patient left in no apparent distress.     28 minutes spent on the date of the encounter doing chart review, history and exam, documentation and further activities per the note     BMI:   Estimated body mass index is 36.15 kg/m  as calculated from the following:    Height as of this encounter: 1.774 m (5' 9.84\").    Weight as of this encounter: 113.8 kg (250 lb 12.8 oz).       See Patient Instructions    Return in about 1 week (around 3/7/2022) for a recheck of your symptoms if not improving, or call 911/go to an ER anytime if worsening.    MIAN Bryant  [facility] JANNETTE Moe is a 61 year old who presents for the " "following health issues     HPI     Concern - Swelling  Onset: a few days  Description: right hand, middle and ring finger  Intensity: moderate  Progression of Symptoms:  improving  Accompanying Signs & Symptoms: cuts, red spots  Previous history of similar problem: Yes  Precipitating factors:        Worsened by: No  Alleviating factors:        Improved by: No  Therapies tried and outcome: soaking in peroxide and epsom salt, Clobetasol ointment    Right handed .    Review of Systems   Constitutional, HEENT, cardiovascular, pulmonary, gi and gu systems are negative, except as otherwise noted.      Objective    BP (!) 162/92   Pulse 97   Temp (!) 96.1  F (35.6  C) (Tympanic)   Resp 14   Ht 1.774 m (5' 9.84\")   Wt 113.8 kg (250 lb 12.8 oz)   SpO2 99%   BMI 36.15 kg/m    Body mass index is 36.15 kg/m .  Physical Exam  Vitals and nursing note reviewed.   Constitutional:       General: He is not in acute distress.     Appearance: Normal appearance. He is not diaphoretic.   HENT:      Head: Normocephalic and atraumatic.      Nose: Nose normal.   Eyes:      Conjunctiva/sclera: Conjunctivae normal.   Pulmonary:      Effort: Pulmonary effort is normal. No respiratory distress.   Musculoskeletal:      Comments: RUE: erythematous and edematous middle and ring fingers with flexural fissures to the digits he says are baseline. Distal CMS intact. Remainder of limb non-tender. Normal ROM in joints of digits.  No other overlying signs of trauma or infection.   Skin:     General: Skin is dry.      Coloration: Skin is not jaundiced or pale.   Neurological:      General: No focal deficit present.      Mental Status: He is alert. Mental status is at baseline.   Psychiatric:         Mood and Affect: Mood normal.         Behavior: Behavior normal.                  "

## 2022-06-07 DIAGNOSIS — E11.9 TYPE 2 DIABETES MELLITUS WITHOUT COMPLICATION, WITHOUT LONG-TERM CURRENT USE OF INSULIN (H): ICD-10-CM

## 2022-06-07 NOTE — TELEPHONE ENCOUNTER
"Routing to team. Please assist pt in scheduling visit. Was to \"Return in about 6 months (around 3/29/2022) for Routine preventive, in person.\"    Tata Dalton RN        "

## 2022-06-08 NOTE — TELEPHONE ENCOUNTER
Called patient and left a voicemail message to call the clinic and schedule an Annual physical/ med check appointment.      Deidre Kennedy

## 2022-06-17 RX ORDER — METFORMIN HCL 500 MG
500 TABLET, EXTENDED RELEASE 24 HR ORAL
Qty: 90 TABLET | Refills: 0 | Status: SHIPPED | OUTPATIENT
Start: 2022-06-17 | End: 2022-07-18

## 2022-06-17 NOTE — TELEPHONE ENCOUNTER
Routing to RN. Please consider alexa refill. Patient is scheduled with Dr Osorio on 07/18/22 and is needing medication to get him through to that appointment.    AISLINN Hernandez  [facility]

## 2022-06-17 NOTE — TELEPHONE ENCOUNTER
Routing to PCP-     Scheduled for next visit with you 7/18/22    Routing refill request to provider for review/approval because:  Patient has documented A1c within the specified period of time.    Lab Results   Component Value Date    A1C 6.4 09/29/2021    A1C 7.7 01/26/2021    A1C 6.3 09/14/2020    A1C 6.7 12/31/2019    A1C 6.3 07/03/2019    A1C 6.5 01/16/2019     Tata Dalton RN

## 2022-07-18 ENCOUNTER — OFFICE VISIT (OUTPATIENT)
Dept: FAMILY MEDICINE | Facility: CLINIC | Age: 62
End: 2022-07-18
Payer: COMMERCIAL

## 2022-07-18 VITALS
HEIGHT: 70 IN | TEMPERATURE: 97.9 F | BODY MASS INDEX: 35.33 KG/M2 | DIASTOLIC BLOOD PRESSURE: 84 MMHG | RESPIRATION RATE: 30 BRPM | WEIGHT: 246.8 LBS | SYSTOLIC BLOOD PRESSURE: 137 MMHG | OXYGEN SATURATION: 98 % | HEART RATE: 97 BPM

## 2022-07-18 DIAGNOSIS — I10 HYPERTENSION GOAL BP (BLOOD PRESSURE) < 140/90: ICD-10-CM

## 2022-07-18 DIAGNOSIS — E11.65 TYPE 2 DIABETES MELLITUS WITH HYPERGLYCEMIA, WITHOUT LONG-TERM CURRENT USE OF INSULIN (H): ICD-10-CM

## 2022-07-18 DIAGNOSIS — E66.01 MORBID OBESITY (H): ICD-10-CM

## 2022-07-18 DIAGNOSIS — E78.5 DYSLIPIDEMIA: ICD-10-CM

## 2022-07-18 DIAGNOSIS — Z00.00 ROUTINE GENERAL MEDICAL EXAMINATION AT A HEALTH CARE FACILITY: Primary | ICD-10-CM

## 2022-07-18 DIAGNOSIS — L30.9 ECZEMA, UNSPECIFIED TYPE: ICD-10-CM

## 2022-07-18 DIAGNOSIS — Z12.11 SCREEN FOR COLON CANCER: ICD-10-CM

## 2022-07-18 LAB — HBA1C MFR BLD: 7.7 % (ref 0–5.6)

## 2022-07-18 PROCEDURE — 99214 OFFICE O/P EST MOD 30 MIN: CPT | Mod: 25 | Performed by: FAMILY MEDICINE

## 2022-07-18 PROCEDURE — 80061 LIPID PANEL: CPT | Performed by: FAMILY MEDICINE

## 2022-07-18 PROCEDURE — 99396 PREV VISIT EST AGE 40-64: CPT | Performed by: FAMILY MEDICINE

## 2022-07-18 PROCEDURE — 99207 PR FOOT EXAM NO CHARGE: CPT | Mod: 25 | Performed by: FAMILY MEDICINE

## 2022-07-18 PROCEDURE — 80053 COMPREHEN METABOLIC PANEL: CPT | Performed by: FAMILY MEDICINE

## 2022-07-18 PROCEDURE — 83036 HEMOGLOBIN GLYCOSYLATED A1C: CPT | Performed by: FAMILY MEDICINE

## 2022-07-18 PROCEDURE — 36415 COLL VENOUS BLD VENIPUNCTURE: CPT | Performed by: FAMILY MEDICINE

## 2022-07-18 RX ORDER — AMLODIPINE BESYLATE 5 MG/1
5 TABLET ORAL 2 TIMES DAILY
Qty: 90 TABLET | Refills: 3 | Status: SHIPPED | OUTPATIENT
Start: 2022-07-18 | End: 2023-01-17

## 2022-07-18 RX ORDER — METFORMIN HCL 500 MG
500 TABLET, EXTENDED RELEASE 24 HR ORAL
Qty: 90 TABLET | Refills: 3 | Status: SHIPPED | OUTPATIENT
Start: 2022-07-18 | End: 2022-07-18

## 2022-07-18 RX ORDER — CLOBETASOL PROPIONATE 0.5 MG/G
1 OINTMENT TOPICAL DAILY
Qty: 90 G | Refills: 3 | Status: SHIPPED | OUTPATIENT
Start: 2022-07-18 | End: 2022-07-18

## 2022-07-18 RX ORDER — METFORMIN HCL 500 MG
1000 TABLET, EXTENDED RELEASE 24 HR ORAL
Qty: 180 TABLET | Refills: 3 | Status: SHIPPED | OUTPATIENT
Start: 2022-07-18 | End: 2023-01-17

## 2022-07-18 RX ORDER — LISINOPRIL 5 MG/1
5 TABLET ORAL DAILY
Qty: 90 TABLET | Refills: 3 | COMMUNITY
Start: 2022-07-18 | End: 2023-01-17

## 2022-07-18 RX ORDER — LISINOPRIL 20 MG/1
20 TABLET ORAL 2 TIMES DAILY
Qty: 180 TABLET | Refills: 3 | Status: SHIPPED | OUTPATIENT
Start: 2022-07-18 | End: 2022-07-18 | Stop reason: ALTCHOICE

## 2022-07-18 RX ORDER — ROSUVASTATIN CALCIUM 5 MG/1
5 TABLET, COATED ORAL DAILY
Qty: 90 TABLET | Refills: 3 | Status: SHIPPED | OUTPATIENT
Start: 2022-07-18 | End: 2022-07-18

## 2022-07-18 RX ORDER — ROSUVASTATIN CALCIUM 5 MG/1
5 TABLET, COATED ORAL AT BEDTIME
Qty: 90 TABLET | Refills: 3 | Status: SHIPPED | OUTPATIENT
Start: 2022-07-18 | End: 2023-01-17

## 2022-07-18 RX ORDER — CLOBETASOL PROPIONATE 0.5 MG/G
1 OINTMENT TOPICAL DAILY
Qty: 180 G | Refills: 6 | Status: SHIPPED | OUTPATIENT
Start: 2022-07-18 | End: 2023-01-17

## 2022-07-18 RX ORDER — FLASH GLUCOSE SENSOR
KIT MISCELLANEOUS
Qty: 14 EACH | Refills: 1 | Status: SHIPPED | OUTPATIENT
Start: 2022-07-18 | End: 2023-09-05

## 2022-07-18 ASSESSMENT — ENCOUNTER SYMPTOMS
DIZZINESS: 0
ALLERGIC/IMMUNOLOGIC NEGATIVE: 1
NERVOUS/ANXIOUS: 0
HEARTBURN: 0
CONSTIPATION: 0
PARESTHESIAS: 0
ARTHRALGIAS: 0
HEMATURIA: 0
ENDOCRINE NEGATIVE: 1
HEADACHES: 0
NAUSEA: 0
SORE THROAT: 0
FEVER: 0
SHORTNESS OF BREATH: 0
CHILLS: 0
ABDOMINAL PAIN: 0
FREQUENCY: 0
PALPITATIONS: 0
EYE PAIN: 0
WEAKNESS: 0
JOINT SWELLING: 0
HEMATOLOGIC/LYMPHATIC NEGATIVE: 1
DIARRHEA: 0
COUGH: 0
DYSURIA: 0
MYALGIAS: 0
HEMATOCHEZIA: 0

## 2022-07-18 ASSESSMENT — PAIN SCALES - GENERAL: PAINLEVEL: NO PAIN (0)

## 2022-07-18 NOTE — PROGRESS NOTES
SUBJECTIVE:   CC: Abhi Pabon is an 62 year old male who presents for preventative health visit.   History of diabetes, hypertension, eczema on his hands and knees.  Weight remains stable.  Blood sugars been averaging normal.    Patient has been advised of split billing requirements and indicates understanding: Yes  Healthy Habits:     Getting at least 3 servings of Calcium per day:  Yes    Bi-annual eye exam:  NO    Dental care twice a year:  Yes    Sleep apnea or symptoms of sleep apnea:  None    Diet:  Low salt, Diabetic and Gluten-free/reduced    Frequency of exercise:  1 day/week    Duration of exercise:  15-30 minutes    Taking medications regularly:  Yes    Barriers to taking medications:  None    Medication side effects:  None    PHQ-2 Total Score: 0    Additional concerns today:  No      Today's PHQ-2 Score:   PHQ-2 ( 1999 [employer]) 7/18/2022   Q1: Little interest or pleasure in doing things 0   Q2: Feeling down, depressed or hopeless 0   PHQ-2 Score 0   PHQ-2 Total Score (12-17 Years)- Positive if 3 or more points; Administer PHQ-A if positive -   Q1: Little interest or pleasure in doing things Not at all   Q2: Feeling down, depressed or hopeless Not at all   PHQ-2 Score 0       Abuse: Current or Past(Physical, Sexual or Emotional)- No  Do you feel safe in your environment? Yes    Have you ever done Advance Care Planning? (For example, a Health Directive, POLST, or a discussion with a medical provider or your loved ones about your wishes): Yes, patient states has an Advance Care Planning document and will bring a copy to the clinic.    Social History     Tobacco Use     Smoking status: Never Smoker     Smokeless tobacco: Never Used   Substance Use Topics     Alcohol use: Yes     If you drink alcohol do you typically have >3 drinks per day or >7 drinks per week? No    Alcohol Use 7/18/2022   Prescreen: >3 drinks/day or >7 drinks/week? No   Prescreen: >3 drinks/day or >7 drinks/week? -   No flowsheet  data found.    Last PSA:   PSA   Date Value Ref Range Status   08/23/2018 0.68 0 - 4 ug/L Final     Comment:     Assay Method:  Chemiluminescence using Siemens Vista analyzer     Prostate Specific Antigen Screen   Date Value Ref Range Status   09/29/2021 0.50 0.00 - 4.00 ug/L Final       Reviewed orders with patient. Reviewed health maintenance and updated orders accordingly - Yes  Labs reviewed in EPIC  BP Readings from Last 3 Encounters:   07/18/22 137/84   02/28/22 (!) 162/92   09/29/21 136/82    Wt Readings from Last 3 Encounters:   07/18/22 111.9 kg (246 lb 12.8 oz)   02/28/22 113.8 kg (250 lb 12.8 oz)   09/29/21 110.7 kg (244 lb)                  Patient Active Problem List   Diagnosis     Hypertension goal BP (blood pressure) < 140/90     Irritant contact dermatitis, unspecified trigger     Eczema, unspecified type     Family history of colon cancer     Anxiety     Class 2 drug-induced obesity with serious comorbidity and body mass index (BMI) of 36.0 to 36.9 in adult     Type 2 diabetes mellitus with hyperglycemia, without long-term current use of insulin (H)     CARDIOVASCULAR SCREENING; LDL GOAL LESS THAN 100     Morbid obesity (H)     Dyslipidemia     History reviewed. No pertinent surgical history.    Social History     Tobacco Use     Smoking status: Never Smoker     Smokeless tobacco: Never Used   Substance Use Topics     Alcohol use: Yes     Family History   Problem Relation Age of Onset     Hypertension Mother      Ovarian Cancer Mother      Diabetes Father      Hypertension Father      Cancer Father      Cervical Cancer Sister      Anxiety Disorder Sister      Cerebrovascular Disease Sister      Heart Disease Sister      Anxiety Disorder Sister      Cerebrovascular Disease Mother          Current Outpatient Medications   Medication Sig Dispense Refill     amLODIPine (NORVASC) 5 MG tablet Take 1 tablet (5 mg) by mouth 2 times daily 90 tablet 3     aspirin (ASA) 81 MG chewable tablet Take 162 mg by  mouth daily       clobetasol (TEMOVATE) 0.05 % external ointment Apply 1 g topically daily Thin layer 180 g 6     Continuous Blood Gluc  (FREESTYLE WILLAM 14 DAY READER) BREANNA Use to read blood sugars as per 's instructions. 1 each 0     Continuous Blood Gluc Sensor (FREESTYLE WILLAM 14 DAY SENSOR) MISC Change every 14 days. 2 each 11     continuous blood glucose monitoring (FREESTYLE WILLAM) sensor For use with Freestyle Willam Flash  for continuous monitioring of blood glucose levels. Replace sensor every 10 days. 14 each 1     lisinopril (ZESTRIL) 5 MG tablet Take 1 tablet (5 mg) by mouth daily 90 tablet 3     metFORMIN (GLUCOPHAGE XR) 500 MG 24 hr tablet Take 1 tablet (500 mg) by mouth daily (with dinner) 90 tablet 3     rosuvastatin (CRESTOR) 5 MG tablet Take 1 tablet (5 mg) by mouth At Bedtime 90 tablet 3       Reviewed and updated as needed this visit by clinical staff   Tobacco  Allergies  Meds   Med Hx  Surg Hx  Fam Hx            Reviewed and updated as needed this visit by Provider                   Past Medical History:   Diagnosis Date     Diabetes mellitus, type 2 (H)      Dyslexia      Hypertension       History reviewed. No pertinent surgical history.  OB History   No obstetric history on file.       Review of Systems   Constitutional: Negative for chills and fever.   HENT: Negative for congestion, ear pain, hearing loss and sore throat.    Eyes: Negative for pain and visual disturbance.   Respiratory: Negative for cough and shortness of breath.    Cardiovascular: Negative for chest pain, palpitations and peripheral edema.   Gastrointestinal: Negative for abdominal pain, constipation, diarrhea, heartburn, hematochezia and nausea.   Endocrine: Negative.    Genitourinary: Negative for dysuria, frequency, genital sores, hematuria, impotence, penile discharge and urgency.   Musculoskeletal: Negative for arthralgias, joint swelling and myalgias.   Skin: Positive for rash.    Allergic/Immunologic: Negative.    Neurological: Negative for dizziness, weakness, headaches and paresthesias.   Hematological: Negative.    Psychiatric/Behavioral: Negative for mood changes. The patient is not nervous/anxious.      CONSTITUTIONAL: NEGATIVE for fever, chills, change in weight  INTEGUMENTARY/SKIN: POSITIVE for rash arms bilateral  EYES: NEGATIVE for vision changes or irritation  ENT: NEGATIVE for ear, mouth and throat problems  RESP: NEGATIVE for significant cough or SOB  CV: NEGATIVE for chest pain, palpitations or peripheral edema  GI: NEGATIVE for nausea, abdominal pain, heartburn, or change in bowel habits   male: negative for dysuria, hematuria, decreased urinary stream, erectile dysfunction, urethral discharge  MUSCULOSKELETAL: NEGATIVE for significant arthralgias or myalgia  NEURO: NEGATIVE for weakness, dizziness or paresthesias  PSYCHIATRIC: NEGATIVE for changes in mood or affect    OBJECTIVE:   There were no vitals taken for this visit.    Physical Exam  GENERAL: healthy, alert and no distress  EYES: Eyes grossly normal to inspection, PERRL and conjunctivae and sclerae normal  HENT: ear canals and TM's normal, nose and mouth without ulcers or lesions  NECK: no adenopathy, no asymmetry, masses, or scars and thyroid normal to palpation  RESP: lungs clear to auscultation - no rales, rhonchi or wheezes  CV: regular rate and rhythm, normal S1 S2, no S3 or S4, no murmur, click or rub, no peripheral edema and peripheral pulses strong  ABDOMEN: soft, nontender, no hepatosplenomegaly, no masses and bowel sounds normal  MS: no gross musculoskeletal defects noted, no edema  SKIN: xerosis - arms and foot bilateral  NEURO: Normal strength and tone, mentation intact and speech normal  PSYCH: mentation appears normal, affect normal/bright  Diabetic foot exam: normal DP and PT pulses, no trophic changes or ulcerative lesions, normal sensory exam, normal monofilament exam and dry, eczema rash at top of  both feet.    Diagnostic Test Results:  Labs reviewed in Epic  Orders Placed This Encounter   Procedures     FOOT EXAM     HEMOGLOBIN A1C     Lipid panel reflex to direct LDL Fasting     Comprehensive metabolic panel (BMP + Alb, Alk Phos, ALT, AST, Total. Bili, TP)     COLOGUARD([employer])       ASSESSMENT/PLAN:   (Z00.00) Routine general medical examination at a health care facility  (primary encounter diagnosis)  Comment:   Plan: Comprehensive metabolic panel (BMP + Alb, Alk         Phos, ALT, AST, Total. Bili, TP)        Routine preventive care discussed.  Advised with diet, exercise, weight loss, increase physical activity.  1 year annual exam follow-up recommended.    (E11.65) Type 2 diabetes mellitus with hyperglycemia, without long-term current use of insulin (H)  Comment:   Plan: HEMOGLOBIN A1C, FOOT EXAM, metFORMIN         (GLUCOPHAGE XR) 500 MG 24 hr tablet, lisinopril        (ZESTRIL) 5 MG tablet        A1c today at higher end 7.7  Advised with diet, increase physical activity, try to lose weight.  Increase metformin to 1000 mg daily.  Follow up in 6 months    (Z12.11) Screen for colon cancer  Comment:   Plan: [employer])        screening    (L30.9) Eczema, unspecified type  Comment:   Plan: clobetasol (TEMOVATE) 0.05 % external ointment,        DISCONTINUED: clobetasol (TEMOVATE) 0.05 %         external ointment            (I10) Hypertension goal BP (blood pressure) < 140/90  Comment:   Plan: Comprehensive metabolic panel (BMP + Alb, Alk         Phos, ALT, AST, Total. Bili, TP), amLODIPine         (NORVASC) 5 MG tablet, lisinopril (ZESTRIL) 5         MG tablet, DISCONTINUED: lisinopril (ZESTRIL)         20 MG tablet            (E78.5) Dyslipidemia  Comment:   Plan: Lipid panel reflex to direct LDL Fasting,         rosuvastatin (CRESTOR) 5 MG tablet,         DISCONTINUED: rosuvastatin (CRESTOR) 5 MG         tablet        Started Rosuvastatin    (E66.01) Morbid obesity (H)  Comment:  "  Plan: discussed diet and weight loss  Increase physical activities, decrease portion size, avoid late meals.    Patient has been advised of split billing requirements and indicates understanding: Yes    COUNSELING:   Reviewed preventive health counseling, as reflected in patient instructions       Regular exercise       Healthy diet/nutrition       Immunizations    Declined: COVID booster due to Other: prefers [employer].              Colorectal cancer screening       Prostate cancer screening    Estimated body mass index is 36.15 kg/m  as calculated from the following:    Height as of 2/28/22: 1.774 m (5' 9.84\").    Weight as of 2/28/22: 113.8 kg (250 lb 12.8 oz).     Weight management plan: Discussed healthy diet and exercise guidelines    He reports that he has never smoked. He has never used smokeless tobacco.      Counseling Resources:  ATP IV Guidelines  Pooled Cohorts Equation Calculator  FRAX Risk Assessment  ICSI Preventive Guidelines  Dietary Guidelines for Americans, 2010  USDA's MyPlate  ASA Prophylaxis  Lung CA Screening    Amanda Osorio MD  [facility]  "

## 2022-07-19 ENCOUNTER — TELEPHONE (OUTPATIENT)
Dept: FAMILY MEDICINE | Facility: CLINIC | Age: 62
End: 2022-07-19

## 2022-07-19 LAB
ALBUMIN SERPL-MCNC: 4 G/DL (ref 3.4–5)
ALP SERPL-CCNC: 62 U/L (ref 40–150)
ALT SERPL W P-5'-P-CCNC: 28 U/L (ref 0–70)
ANION GAP SERPL CALCULATED.3IONS-SCNC: 7 MMOL/L (ref 3–14)
AST SERPL W P-5'-P-CCNC: 20 U/L (ref 0–45)
BILIRUB SERPL-MCNC: 0.5 MG/DL (ref 0.2–1.3)
BUN SERPL-MCNC: 13 MG/DL (ref 7–30)
CALCIUM SERPL-MCNC: 9.2 MG/DL (ref 8.5–10.1)
CHLORIDE BLD-SCNC: 106 MMOL/L (ref 94–109)
CHOLEST SERPL-MCNC: 226 MG/DL
CO2 SERPL-SCNC: 25 MMOL/L (ref 20–32)
CREAT SERPL-MCNC: 0.98 MG/DL (ref 0.66–1.25)
FASTING STATUS PATIENT QL REPORTED: YES
GFR SERPL CREATININE-BSD FRML MDRD: 87 ML/MIN/1.73M2
GLUCOSE BLD-MCNC: 107 MG/DL (ref 70–99)
HDLC SERPL-MCNC: 49 MG/DL
LDLC SERPL CALC-MCNC: 160 MG/DL
NONHDLC SERPL-MCNC: 177 MG/DL
POTASSIUM BLD-SCNC: 3.9 MMOL/L (ref 3.4–5.3)
PROT SERPL-MCNC: 7.9 G/DL (ref 6.8–8.8)
SODIUM SERPL-SCNC: 138 MMOL/L (ref 133–144)
TRIGL SERPL-MCNC: 83 MG/DL

## 2022-07-19 NOTE — TELEPHONE ENCOUNTER
Prior Authorization Retail Medication Request    Medication/Dose: Jaclyn snell; please send in alternative or start PA    ICD code (if different than what is on RX):    Previously Tried and Failed:    Rationale:    Insurance Name:    Insurance ID:        Pharmacy Information (if different than what is on RX)  Name:    Phone:

## 2022-07-20 NOTE — TELEPHONE ENCOUNTER
Central Prior Authorization Team   Phone: [phone]    PA Initiation    Medication: Freetyle alis; please send in alternative or start PA  Insurance Company:    Pharmacy Filling the Rx: [facility] - [place], [place] - [address] [place] [address]  Filling Pharmacy Phone: [phone]  Filling Pharmacy Fax: [phone]  Start Date: 7/20/2022

## 2022-07-21 NOTE — TELEPHONE ENCOUNTER
PRIOR AUTHORIZATION DENIED    Medication: Jaclyn snell - DENIED    Denial Date: 7/20/2022    Denial Rational: MEDICATION IS EXCLUDED FROM COVERAGE.        Appeal Information:  IF YOU WOULD LIKE TO APPEAL PLEASE SUPPLY PA TEAM WITH A LETTER OF MEDICAL NECESSITY WITH CLINICAL REASON.

## 2022-08-04 NOTE — PATIENT INSTRUCTIONS
Preventive Health Recommendations  Male Ages 50 - 64    Yearly exam:             See your health care provider every year in order to  o   Review health changes.   o   Discuss preventive care.    o   Review your medicines if your doctor has prescribed any.     Have a cholesterol test every 5 years, or more frequently if you are at risk for high cholesterol/heart disease.     Have a diabetes test (fasting glucose) every three years. If you are at risk for diabetes, you should have this test more often.     Have a colonoscopy at age 50, or have a yearly FIT test (stool test). These exams will check for colon cancer.      Talk with your health care provider about whether or not a prostate cancer screening test (PSA) is right for you.    You should be tested each year for STDs (sexually transmitted diseases), if you re at risk.     Shots: Get a flu shot each year. Get a tetanus shot every 10 years.     Nutrition:    Eat at least 5 servings of fruits and vegetables daily.     Eat whole-grain bread, whole-wheat pasta and brown rice instead of white grains and rice.     Get adequate Calcium and Vitamin D.     Lifestyle    Exercise for at least 150 minutes a week (30 minutes a day, 5 days a week). This will help you control your weight and prevent disease.     Limit alcohol to one drink per day.     No smoking.     Wear sunscreen to prevent skin cancer.     See your dentist every six months for an exam and cleaning.     See your eye doctor every 1 to 2 years.    
Negative

## 2022-12-04 NOTE — TELEPHONE ENCOUNTER
I see the letter, the date reads 11/230/2020.    Is it possible to correct this before printing?  Routed back to Dr. Osorio.    Chen Rodriguez RN  [facility]       - - -

## 2023-01-17 ENCOUNTER — TELEPHONE (OUTPATIENT)
Dept: FAMILY MEDICINE | Facility: CLINIC | Age: 63
End: 2023-01-17
Payer: COMMERCIAL

## 2023-01-17 DIAGNOSIS — E78.5 DYSLIPIDEMIA: ICD-10-CM

## 2023-01-17 DIAGNOSIS — I10 HYPERTENSION GOAL BP (BLOOD PRESSURE) < 140/90: ICD-10-CM

## 2023-01-17 DIAGNOSIS — E11.65 TYPE 2 DIABETES MELLITUS WITH HYPERGLYCEMIA, WITHOUT LONG-TERM CURRENT USE OF INSULIN (H): ICD-10-CM

## 2023-01-17 DIAGNOSIS — L30.9 ECZEMA, UNSPECIFIED TYPE: ICD-10-CM

## 2023-01-17 DIAGNOSIS — L03.011 CELLULITIS OF FINGER OF RIGHT HAND: ICD-10-CM

## 2023-01-17 DIAGNOSIS — L03.019 PARONYCHIA OF FINGER, UNSPECIFIED LATERALITY: ICD-10-CM

## 2023-01-17 RX ORDER — ROSUVASTATIN CALCIUM 5 MG/1
5 TABLET, COATED ORAL AT BEDTIME
Qty: 90 TABLET | Refills: 1 | Status: SHIPPED | OUTPATIENT
Start: 2023-01-17 | End: 2023-07-26

## 2023-01-17 RX ORDER — AMLODIPINE BESYLATE 5 MG/1
5 TABLET ORAL 2 TIMES DAILY
Qty: 90 TABLET | Refills: 0 | Status: SHIPPED | OUTPATIENT
Start: 2023-01-17 | End: 2023-05-04

## 2023-01-17 RX ORDER — CEPHALEXIN 500 MG/1
500 CAPSULE ORAL 2 TIMES DAILY
Qty: 20 CAPSULE | Refills: 0 | Status: SHIPPED | OUTPATIENT
Start: 2023-01-17 | End: 2023-09-05

## 2023-01-17 RX ORDER — LISINOPRIL 5 MG/1
5 TABLET ORAL DAILY
Qty: 90 TABLET | Refills: 0 | Status: SHIPPED | OUTPATIENT
Start: 2023-01-17 | End: 2023-05-04

## 2023-01-17 RX ORDER — METFORMIN HCL 500 MG
1000 TABLET, EXTENDED RELEASE 24 HR ORAL
Qty: 180 TABLET | Refills: 3 | Status: SHIPPED | OUTPATIENT
Start: 2023-01-17 | End: 2023-09-05

## 2023-01-17 RX ORDER — CEPHALEXIN 500 MG/1
500 CAPSULE ORAL 2 TIMES DAILY
Qty: 20 CAPSULE | Refills: 0 | Status: SHIPPED | OUTPATIENT
Start: 2023-01-17 | End: 2023-01-17

## 2023-01-17 RX ORDER — CLOBETASOL PROPIONATE 0.5 MG/G
1 OINTMENT TOPICAL DAILY
Qty: 180 G | Refills: 6 | Status: SHIPPED | OUTPATIENT
Start: 2023-01-17 | End: 2023-09-05

## 2023-01-17 NOTE — TELEPHONE ENCOUNTER
Routing to PCO    Willing to prescribe antibiotic for recurrent infection of finger?    Need appointment?    [facility]    Patient has been on vibramycin and Keflex.    patient state has been told he should alternate?.    Per patient, at annual physical. PCP advised Rx could be kept on file and when needed, could reorder?    RN dos not see documentation    Patient last on Keflex 3/2022.        RN received call from patient     Patient states he has developed another infection on his right middle and wring finger.  Patient states infection occurs yearly and was told by PCP medication could be on file to reorder.    Eulalio Eden, RN, BSN, PHN  [facility]

## 2023-01-17 NOTE — TELEPHONE ENCOUNTER
RN called pt to relay message.     Rx sent to wrong pharmacy    RN sent RX to correct pharmacy- fulfilling signed orders.     Patient verbalized understanding and in agreement with plan of care.     Priscilla Chun RN

## 2023-02-24 ENCOUNTER — TELEPHONE (OUTPATIENT)
Dept: FAMILY MEDICINE | Facility: CLINIC | Age: 63
End: 2023-02-24
Payer: COMMERCIAL

## 2023-02-24 DIAGNOSIS — L03.011 CELLULITIS OF FINGER OF RIGHT HAND: Primary | ICD-10-CM

## 2023-02-24 NOTE — TELEPHONE ENCOUNTER
"Patient calling, says he has a recurrent infection of right middle and ring fingers.    Says he has dry cracked skin, says he has painful cuts to the first and 2nd knuckles, open areas on palmar aspect and on the side of fingers.    He has to wear gloves at work and washes hands a lot.       He says Dr. Osorio has seen this in the past, has advised okay to use oral antibiotics when this flares.    Abhi feels it would be more effective to alternate the doxycycline/vibramycin and the keflex.   He used Keflex in January and it was \"almost totally effective\".    Would like provider to consider Rx for doxycycline this time.    He says he uses lotions and vaseline, says his clobetasol ointment for eczema works well for the finger cuts.  I advised it appears he has refills of clobetasol available, can call pharmacy.    Denies fingers are swelling, warm, streaking redness, fever or chills.    Advised him to continue to use lotions and vaseline.   Get seen this weekend if swelling, streaking, fever or chills occur.    Routed to Dr. Osorio to address on Monday.    Chen Rodriguez RN  [facility]              "

## 2023-02-25 RX ORDER — DOXYCYCLINE 100 MG/1
100 CAPSULE ORAL 2 TIMES DAILY
Qty: 20 CAPSULE | Refills: 0 | Status: SHIPPED | OUTPATIENT
Start: 2023-02-25 | End: 2023-09-05

## 2023-02-27 NOTE — TELEPHONE ENCOUNTER
Called and left semi detailed message that a medication was sent to his pharmacy       Ct Marquez RN

## 2023-05-03 DIAGNOSIS — E11.65 TYPE 2 DIABETES MELLITUS WITH HYPERGLYCEMIA, WITHOUT LONG-TERM CURRENT USE OF INSULIN (H): ICD-10-CM

## 2023-05-03 DIAGNOSIS — I10 HYPERTENSION GOAL BP (BLOOD PRESSURE) < 140/90: ICD-10-CM

## 2023-05-04 RX ORDER — AMLODIPINE BESYLATE 5 MG/1
5 TABLET ORAL 2 TIMES DAILY
Qty: 180 TABLET | Refills: 0 | Status: SHIPPED | OUTPATIENT
Start: 2023-05-04 | End: 2023-07-26

## 2023-05-04 RX ORDER — LISINOPRIL 5 MG/1
5 TABLET ORAL DAILY
Qty: 90 TABLET | Refills: 0 | Status: SHIPPED | OUTPATIENT
Start: 2023-05-04 | End: 2023-07-26

## 2023-06-19 ENCOUNTER — PATIENT OUTREACH (OUTPATIENT)
Dept: CARE COORDINATION | Facility: CLINIC | Age: 63
End: 2023-06-19
Payer: COMMERCIAL

## 2023-07-03 ENCOUNTER — PATIENT OUTREACH (OUTPATIENT)
Dept: CARE COORDINATION | Facility: CLINIC | Age: 63
End: 2023-07-03
Payer: COMMERCIAL

## 2023-07-25 DIAGNOSIS — E78.5 DYSLIPIDEMIA: ICD-10-CM

## 2023-07-25 DIAGNOSIS — I10 HYPERTENSION GOAL BP (BLOOD PRESSURE) < 140/90: ICD-10-CM

## 2023-07-25 DIAGNOSIS — E11.65 TYPE 2 DIABETES MELLITUS WITH HYPERGLYCEMIA, WITHOUT LONG-TERM CURRENT USE OF INSULIN (H): ICD-10-CM

## 2023-07-26 RX ORDER — ROSUVASTATIN CALCIUM 5 MG/1
5 TABLET, COATED ORAL AT BEDTIME
Qty: 90 TABLET | Refills: 1 | Status: SHIPPED | OUTPATIENT
Start: 2023-07-26 | End: 2023-09-05

## 2023-07-26 RX ORDER — LISINOPRIL 5 MG/1
5 TABLET ORAL DAILY
Qty: 90 TABLET | Refills: 0 | Status: SHIPPED | OUTPATIENT
Start: 2023-07-26 | End: 2023-09-05

## 2023-07-26 RX ORDER — AMLODIPINE BESYLATE 5 MG/1
TABLET ORAL
Qty: 180 TABLET | Refills: 0 | Status: SHIPPED | OUTPATIENT
Start: 2023-07-26 | End: 2023-09-05

## 2023-09-05 ENCOUNTER — OFFICE VISIT (OUTPATIENT)
Dept: FAMILY MEDICINE | Facility: CLINIC | Age: 63
End: 2023-09-05
Payer: COMMERCIAL

## 2023-09-05 ENCOUNTER — LAB (OUTPATIENT)
Dept: FAMILY MEDICINE | Facility: CLINIC | Age: 63
End: 2023-09-05

## 2023-09-05 VITALS
TEMPERATURE: 97.3 F | SYSTOLIC BLOOD PRESSURE: 130 MMHG | OXYGEN SATURATION: 97 % | HEIGHT: 70 IN | DIASTOLIC BLOOD PRESSURE: 78 MMHG | HEART RATE: 90 BPM | BODY MASS INDEX: 35.22 KG/M2 | RESPIRATION RATE: 16 BRPM | WEIGHT: 246 LBS

## 2023-09-05 DIAGNOSIS — Z12.5 SCREENING FOR PROSTATE CANCER: ICD-10-CM

## 2023-09-05 DIAGNOSIS — E66.01 MORBID OBESITY (H): ICD-10-CM

## 2023-09-05 DIAGNOSIS — L30.9 ECZEMA, UNSPECIFIED TYPE: ICD-10-CM

## 2023-09-05 DIAGNOSIS — I10 HYPERTENSION GOAL BP (BLOOD PRESSURE) < 140/90: ICD-10-CM

## 2023-09-05 DIAGNOSIS — Z00.00 ROUTINE GENERAL MEDICAL EXAMINATION AT A HEALTH CARE FACILITY: Primary | ICD-10-CM

## 2023-09-05 DIAGNOSIS — Z12.11 SCREEN FOR COLON CANCER: ICD-10-CM

## 2023-09-05 DIAGNOSIS — E78.5 DYSLIPIDEMIA: ICD-10-CM

## 2023-09-05 DIAGNOSIS — L03.011 CELLULITIS OF FINGER OF RIGHT HAND: ICD-10-CM

## 2023-09-05 DIAGNOSIS — E11.65 TYPE 2 DIABETES MELLITUS WITH HYPERGLYCEMIA, WITHOUT LONG-TERM CURRENT USE OF INSULIN (H): ICD-10-CM

## 2023-09-05 LAB
HBA1C MFR BLD: 8.7 % (ref 0–5.6)
HOLD SPECIMEN: NORMAL

## 2023-09-05 PROCEDURE — 82043 UR ALBUMIN QUANTITATIVE: CPT | Performed by: FAMILY MEDICINE

## 2023-09-05 PROCEDURE — G0103 PSA SCREENING: HCPCS | Performed by: FAMILY MEDICINE

## 2023-09-05 PROCEDURE — 80053 COMPREHEN METABOLIC PANEL: CPT | Performed by: FAMILY MEDICINE

## 2023-09-05 PROCEDURE — 99207 PR FOOT EXAM NO CHARGE: CPT | Mod: 25 | Performed by: FAMILY MEDICINE

## 2023-09-05 PROCEDURE — 83036 HEMOGLOBIN GLYCOSYLATED A1C: CPT | Performed by: FAMILY MEDICINE

## 2023-09-05 PROCEDURE — 36415 COLL VENOUS BLD VENIPUNCTURE: CPT | Performed by: FAMILY MEDICINE

## 2023-09-05 PROCEDURE — 82248 BILIRUBIN DIRECT: CPT | Performed by: FAMILY MEDICINE

## 2023-09-05 PROCEDURE — 99396 PREV VISIT EST AGE 40-64: CPT | Performed by: FAMILY MEDICINE

## 2023-09-05 PROCEDURE — 99213 OFFICE O/P EST LOW 20 MIN: CPT | Mod: 25 | Performed by: FAMILY MEDICINE

## 2023-09-05 PROCEDURE — 80061 LIPID PANEL: CPT | Performed by: FAMILY MEDICINE

## 2023-09-05 PROCEDURE — 82570 ASSAY OF URINE CREATININE: CPT | Performed by: FAMILY MEDICINE

## 2023-09-05 RX ORDER — FLASH GLUCOSE SENSOR
KIT MISCELLANEOUS
Qty: 14 EACH | Refills: 1 | Status: SHIPPED | OUTPATIENT
Start: 2023-09-05 | End: 2024-01-11

## 2023-09-05 RX ORDER — CLOBETASOL PROPIONATE 0.5 MG/G
1 OINTMENT TOPICAL DAILY
Qty: 180 G | Refills: 6 | Status: SHIPPED | OUTPATIENT
Start: 2023-09-05 | End: 2024-09-30

## 2023-09-05 RX ORDER — METFORMIN HCL 500 MG
1000 TABLET, EXTENDED RELEASE 24 HR ORAL
Qty: 180 TABLET | Refills: 3 | Status: SHIPPED | OUTPATIENT
Start: 2023-09-05 | End: 2024-01-14

## 2023-09-05 RX ORDER — ROSUVASTATIN CALCIUM 5 MG/1
5 TABLET, COATED ORAL AT BEDTIME
Qty: 90 TABLET | Refills: 3 | Status: SHIPPED | OUTPATIENT
Start: 2023-09-05 | End: 2024-05-21

## 2023-09-05 RX ORDER — LISINOPRIL 5 MG/1
5 TABLET ORAL DAILY
Qty: 90 TABLET | Refills: 3 | Status: SHIPPED | OUTPATIENT
Start: 2023-09-05 | End: 2024-01-14 | Stop reason: DRUGHIGH

## 2023-09-05 RX ORDER — DOXYCYCLINE 100 MG/1
100 CAPSULE ORAL 2 TIMES DAILY
Qty: 20 CAPSULE | Refills: 0 | Status: SHIPPED | OUTPATIENT
Start: 2023-09-05 | End: 2024-05-21

## 2023-09-05 RX ORDER — FLASH GLUCOSE SENSOR
KIT MISCELLANEOUS
Qty: 2 EACH | Refills: 11 | Status: SHIPPED | OUTPATIENT
Start: 2023-09-05 | End: 2024-01-16

## 2023-09-05 RX ORDER — AMLODIPINE BESYLATE 10 MG/1
10 TABLET ORAL DAILY
Qty: 90 TABLET | Refills: 3 | Status: SHIPPED | OUTPATIENT
Start: 2023-09-05 | End: 2024-05-21

## 2023-09-05 ASSESSMENT — ENCOUNTER SYMPTOMS
NERVOUS/ANXIOUS: 0
ENDOCRINE NEGATIVE: 1
COUGH: 0
WEAKNESS: 0
HEMATOCHEZIA: 0
DYSURIA: 0
ARTHRALGIAS: 0
SORE THROAT: 0
CHILLS: 0
FREQUENCY: 0
PALPITATIONS: 0
ALLERGIC/IMMUNOLOGIC NEGATIVE: 1
HEADACHES: 0
DIZZINESS: 0
MYALGIAS: 0
PARESTHESIAS: 0
EYE PAIN: 0
DIARRHEA: 0
FEVER: 0
SHORTNESS OF BREATH: 0
HEARTBURN: 0
HEMATOLOGIC/LYMPHATIC NEGATIVE: 1
HEMATURIA: 0
ABDOMINAL PAIN: 0
JOINT SWELLING: 0
CONSTIPATION: 0
NAUSEA: 0

## 2023-09-05 ASSESSMENT — PAIN SCALES - GENERAL: PAINLEVEL: NO PAIN (0)

## 2023-09-05 NOTE — PROGRESS NOTES
SUBJECTIVE:   CC: Abhi is an 63 year old who presents for preventative health visit.   Comes for annual exam, history of diabetes.  He has been taking metformin once daily.        9/5/2023     2:08 PM   Additional Questions   Roomed by Manuel NOLASCO CMA   Accompanied by Self         9/5/2023     2:08 PM   Patient Reported Additional Medications   Patient reports taking the following new medications None       Healthy Habits:     Getting at least 3 servings of Calcium per day:  Yes    Bi-annual eye exam:  NO    Dental care twice a year:  Yes    Sleep apnea or symptoms of sleep apnea:  Daytime drowsiness    Diet:  Low salt, Diabetic and Gluten-free/reduced    Frequency of exercise:  1 day/week    Duration of exercise:  15-30 minutes    Taking medications regularly:  Yes    Barriers to taking medications:  None    Medication side effects:  Lightheadedness    Additional concerns today:  No      Today's PHQ-2 Score:       9/5/2023     1:46 PM   PHQ-2 ( 1999 [employer])   Q1: Little interest or pleasure in doing things 0   Q2: Feeling down, depressed or hopeless 0   PHQ-2 Score 0   Q1: Little interest or pleasure in doing things Not at all   Q2: Feeling down, depressed or hopeless Not at all   PHQ-2 Score 0             Social History     Tobacco Use    Smoking status: Never     Passive exposure: Never    Smokeless tobacco: Never   Substance Use Topics    Alcohol use: Yes             9/5/2023     1:46 PM   Alcohol Use   Prescreen: >3 drinks/day or >7 drinks/week? No          No data to display                Last PSA:   PSA   Date Value Ref Range Status   08/23/2018 0.68 0 - 4 ug/L Final     Comment:     Assay Method:  Chemiluminescence using Siemens Vista analyzer     Prostate Specific Antigen Screen   Date Value Ref Range Status   09/29/2021 0.50 0.00 - 4.00 ug/L Final       Reviewed orders with patient. Reviewed health maintenance and updated orders accordingly - Yes  Lab work is in process  Labs reviewed in  EPIC    Reviewed and updated as needed this visit by clinical staff   Tobacco  Allergies  Meds   Med Hx  Surg Hx  Fam Hx  Soc Hx        Reviewed and updated as needed this visit by Provider                 Past Medical History:   Diagnosis Date    Diabetes mellitus, type 2 (H)     Dyslexia     Hypertension       History reviewed. No pertinent surgical history.  OB History   No obstetric history on file.       Review of Systems   Constitutional:  Negative for chills and fever.   HENT:  Negative for congestion, ear pain, hearing loss and sore throat.    Eyes:  Negative for pain and visual disturbance.   Respiratory:  Negative for cough and shortness of breath.    Cardiovascular:  Negative for chest pain, palpitations and peripheral edema.   Gastrointestinal:  Negative for abdominal pain, constipation, diarrhea, heartburn, hematochezia and nausea.   Endocrine: Negative.    Genitourinary:  Negative for dysuria, frequency, genital sores, hematuria, impotence, penile discharge and urgency.   Musculoskeletal:  Negative for arthralgias, joint swelling and myalgias.   Skin:  Positive for rash.   Allergic/Immunologic: Negative.    Neurological:  Negative for dizziness, weakness, headaches and paresthesias.   Hematological: Negative.    Psychiatric/Behavioral:  Negative for mood changes. The patient is not nervous/anxious.      CONSTITUTIONAL: NEGATIVE for fever, chills, change in weight  INTEGUMENTARY/SKIN: NEGATIVE for worrisome rashes, moles or lesions  EYES: NEGATIVE for vision changes or irritation  ENT: NEGATIVE for ear, mouth and throat problems  RESP: NEGATIVE for significant cough or SOB  CV: NEGATIVE for chest pain, palpitations or peripheral edema  GI: NEGATIVE for nausea, abdominal pain, heartburn, or change in bowel habits   male: negative for dysuria, hematuria, decreased urinary stream, erectile dysfunction, urethral discharge  MUSCULOSKELETAL: NEGATIVE for significant arthralgias or myalgia  NEURO:  "NEGATIVE for weakness, dizziness or paresthesias  ENDOCRINE: NEGATIVE for temperature intolerance, skin/hair changes  HEME/ALLERGY/IMMUNE: NEGATIVE for bleeding problems  PSYCHIATRIC: NEGATIVE for changes in mood or affect    OBJECTIVE:   BP (!) 143/84 (BP Location: Right arm, Patient Position: Chair, Cuff Size: Adult Large)   Pulse 90   Temp 97.3  F (36.3  C) (Oral)   Resp 16   Ht 1.77 m (5' 9.69\")   Wt 111.6 kg (246 lb)   SpO2 97%   BMI 35.62 kg/m      Physical Exam  GENERAL: healthy, alert and no distress  EYES: Eyes grossly normal to inspection, PERRL and conjunctivae and sclerae normal  HENT: ear canals and TM's normal, nose and mouth without ulcers or lesions  NECK: no adenopathy, no asymmetry, masses, or scars and thyroid normal to palpation  RESP: lungs clear to auscultation - no rales, rhonchi or wheezes  CV: regular rate and rhythm, normal S1 S2, no S3 or S4, no murmur, click or rub, no peripheral edema and peripheral pulses strong  ABDOMEN: soft, nontender, no hepatosplenomegaly, no masses and bowel sounds normal  MS: no gross musculoskeletal defects noted, no edema  SKIN: no suspicious lesions or rashes  NEURO: Normal strength and tone, mentation intact and speech normal  PSYCH: mentation appears normal, affect normal/bright  Diabetic foot exam: normal DP and PT pulses, no trophic changes or ulcerative lesions, normal sensory exam, and normal monofilament exam    Diagnostic Test Results:  Labs reviewed in Epic  Lab Results   Component Value Date    A1C 8.7 09/05/2023    A1C 7.7 07/18/2022    A1C 6.4 09/29/2021    A1C 7.7 01/26/2021    A1C 6.3 09/14/2020    A1C 6.7 12/31/2019    A1C 6.3 07/03/2019    A1C 6.5 01/16/2019     Orders Placed This Encounter   Procedures    REVIEW OF HEALTH MAINTENANCE PROTOCOL ORDERS    FOOT EXAM    Albumin Random Urine Quantitative with Creat Ratio    HEMOGLOBIN A1C    BASIC METABOLIC PANEL    Lipid panel reflex to direct LDL Non-fasting    Extra Tube    PSA, screen    " Extra Red Top Tube    Extra Green Top (Lithium Heparin) Tube    Extra Purple Top Tube    Hepatic panel (Albumin, ALT, AST, Bili, Alk Phos, TP)    Adult Eye  Referral        ASSESSMENT/PLAN:       ICD-10-CM    1. Routine general medical examination at a health care facility  Z00.00 Hepatic panel (Albumin, ALT, AST, Bili, Alk Phos, TP)     Hepatic panel (Albumin, ALT, AST, Bili, Alk Phos, TP)      2. Type 2 diabetes mellitus with hyperglycemia, without long-term current use of insulin (H)  E11.65 Adult Eye  Referral     Albumin Random Urine Quantitative with Creat Ratio     HEMOGLOBIN A1C     Lipid panel reflex to direct LDL Non-fasting     FOOT EXAM     Albumin Random Urine Quantitative with Creat Ratio     HEMOGLOBIN A1C     Lipid panel reflex to direct LDL Non-fasting     continuous blood glucose monitoring ([employer] WILLAM) sensor     metFORMIN (GLUCOPHAGE XR) 500 MG 24 hr tablet     lisinopril (ZESTRIL) 5 MG tablet      3. Hypertension goal BP (blood pressure) < 140/90  I10 BASIC METABOLIC PANEL     BASIC METABOLIC PANEL     lisinopril (ZESTRIL) 5 MG tablet     amLODIPine (NORVASC) 10 MG tablet      4. Screening for prostate cancer  Z12.5 PSA, screen     PSA, screen      5. Cellulitis of finger of right hand  L03.011 doxycycline hyclate (VIBRAMYCIN) 100 MG capsule      6. Dyslipidemia  E78.5 rosuvastatin (CRESTOR) 5 MG tablet      7. Eczema, unspecified type  L30.9 clobetasol (TEMOVATE) 0.05 % external ointment      8. Screen for colon cancer  Z12.11 COLOGUARD(EXACT SCIENCES)      9. Morbid obesity (H)  E66.01          Discussed diet, exercise, wellness and other preventive recommendations related to health maintenance.   Follow up as needed for acute issues.    Physical exam recommended in one year.     Diabetes: Hemoglobin A1c at 8.1.  Advised with diet, exercise, increase physical activity, weight loss.  Changed metformin to 1 tablet twice daily.  Follow-up in 3 months.    Continue with all  "other medications.    Patient has been advised of split billing requirements and indicates understanding: Yes      COUNSELING:   Reviewed preventive health counseling, as reflected in patient instructions       Regular exercise       Healthy diet/nutrition       Colorectal cancer screening       Prostate cancer screening      BMI:   Estimated body mass index is 35.62 kg/m  as calculated from the following:    Height as of this encounter: 1.77 m (5' 9.69\").    Weight as of this encounter: 111.6 kg (246 lb).   Weight management plan: Discussed healthy diet and exercise guidelines      He reports that he has never smoked. He has never been exposed to tobacco smoke. He has never used smokeless tobacco.            Amanda Osorio MD  [facility]  "

## 2023-09-06 ENCOUNTER — TELEPHONE (OUTPATIENT)
Dept: FAMILY MEDICINE | Facility: CLINIC | Age: 63
End: 2023-09-06

## 2023-09-06 ENCOUNTER — TELEPHONE (OUTPATIENT)
Dept: FAMILY MEDICINE | Facility: CLINIC | Age: 63
End: 2023-09-06
Payer: COMMERCIAL

## 2023-09-06 LAB
ALBUMIN SERPL BCG-MCNC: 4.7 G/DL (ref 3.5–5.2)
ALP SERPL-CCNC: 71 U/L (ref 40–129)
ALT SERPL W P-5'-P-CCNC: 29 U/L (ref 0–70)
ANION GAP SERPL CALCULATED.3IONS-SCNC: 11 MMOL/L (ref 7–15)
AST SERPL W P-5'-P-CCNC: 25 U/L (ref 0–45)
BILIRUB DIRECT SERPL-MCNC: <0.2 MG/DL (ref 0–0.3)
BILIRUB SERPL-MCNC: 0.6 MG/DL
BUN SERPL-MCNC: 8.6 MG/DL (ref 8–23)
CALCIUM SERPL-MCNC: 9.6 MG/DL (ref 8.8–10.2)
CHLORIDE SERPL-SCNC: 101 MMOL/L (ref 98–107)
CHOLEST SERPL-MCNC: 159 MG/DL
CREAT SERPL-MCNC: 0.96 MG/DL (ref 0.67–1.17)
CREAT UR-MCNC: 135 MG/DL
DEPRECATED HCO3 PLAS-SCNC: 24 MMOL/L (ref 22–29)
GFR SERPL CREATININE-BSD FRML MDRD: 89 ML/MIN/1.73M2
GLUCOSE SERPL-MCNC: 162 MG/DL (ref 70–99)
HDLC SERPL-MCNC: 43 MG/DL
LDLC SERPL CALC-MCNC: 89 MG/DL
MICROALBUMIN UR-MCNC: 599 MG/L
MICROALBUMIN/CREAT UR: 443.7 MG/G CR (ref 0–17)
NONHDLC SERPL-MCNC: 116 MG/DL
POTASSIUM SERPL-SCNC: 4.1 MMOL/L (ref 3.4–5.3)
PROT SERPL-MCNC: 7.4 G/DL (ref 6.4–8.3)
PSA SERPL DL<=0.01 NG/ML-MCNC: 0.4 NG/ML (ref 0–4.5)
SODIUM SERPL-SCNC: 136 MMOL/L (ref 136–145)
TRIGL SERPL-MCNC: 134 MG/DL

## 2023-09-06 NOTE — TELEPHONE ENCOUNTER
Attempt #1 to call patient.     RN left voicemail and requested return call to [facility] at [phone].     Nayla Fernandez RN, BSN  [facility]: [place]

## 2023-09-06 NOTE — LETTER
September 8, 2023      Abhi Pabon  [address] [place] [address]        Dear Cm,    We are writing to inform you of your test results.    Normal for total cholesterol, LDL, continue with current medication.  Normal for kidney function, liver function.  Normal for PSA, prostate-specific antigen.  Blood sugar, at the higher side.  A1c at the higher side.  Advised patient on diet, exercise, increase physical activity.     Advised patient to start taking metformin 1 tablet twice a day after breakfast, and after supper.    Resulted Orders   Albumin Random Urine Quantitative with Creat Ratio   Result Value Ref Range    Creatinine Urine mg/dL 135.0 mg/dL      Comment:      The reference ranges have not been established in urine creatinine. The results should be integrated into the clinical context for interpretation.    Albumin Urine mg/L 599.0 mg/L      Comment:      The reference ranges have not been established in urine albumin. The results should be integrated into the clinical context for interpretation.    Albumin Urine mg/g Cr 443.70 (H) 0.00 - 17.00 mg/g Cr      Comment:      Microalbuminuria is defined as an albumin:creatinine ratio of 17 to 299 for males and 25 to 299 for females. A ratio of albumin:creatinine of 300 or higher is indicative of overt proteinuria.  Due to biologic variability, positive results should be confirmed by a second, first-morning random or 24-hour timed urine specimen. If there is discrepancy, a third specimen is recommended. When 2 out of 3 results are in the microalbuminuria range, this is evidence for incipient nephropathy and warrants increased efforts at glucose control, blood pressure control, and institution of therapy with an angiotensin-converting-enzyme (ACE) inhibitor (if the patient can tolerate it).     HEMOGLOBIN A1C   Result Value Ref Range    Hemoglobin A1C 8.7 (H) 0.0 - 5.6 %   BASIC METABOLIC PANEL   Result Value Ref Range    Sodium 136 136 -  145 mmol/L    Potassium 4.1 3.4 - 5.3 mmol/L    Chloride 101 98 - 107 mmol/L    Carbon Dioxide (CO2) 24 22 - 29 mmol/L    Anion Gap 11 7 - 15 mmol/L    Urea Nitrogen 8.6 8.0 - 23.0 mg/dL    Creatinine 0.96 0.67 - 1.17 mg/dL    Calcium 9.6 8.8 - 10.2 mg/dL    Glucose 162 (H) 70 - 99 mg/dL    GFR Estimate 89 >60 mL/min/1.73m2   Lipid panel reflex to direct LDL Non-fasting   Result Value Ref Range    Cholesterol 159 <200 mg/dL    Triglycerides 134 <150 mg/dL    Direct Measure HDL 43 >=40 mg/dL    LDL Cholesterol Calculated 89 <=100 mg/dL    Non HDL Cholesterol 116 <130 mg/dL    Narrative    Cholesterol  Desirable:  <200 mg/dL    Triglycerides  Normal:  Less than 150 mg/dL  Borderline High:  150-199 mg/dL  High:  200-499 mg/dL  Very High:  Greater than or equal to 500 mg/dL    Direct Measure HDL  Female:  Greater than or equal to 50 mg/dL   Male:  Greater than or equal to 40 mg/dL    LDL Cholesterol  Desirable:  <100mg/dL  Above Desirable:  100-129 mg/dL   Borderline High:  130-159 mg/dL   High:  160-189 mg/dL   Very High:  >= 190 mg/dL    Non HDL Cholesterol  Desirable:  130 mg/dL  Above Desirable:  130-159 mg/dL  Borderline High:  160-189 mg/dL  High:  190-219 mg/dL  Very High:  Greater than or equal to 220 mg/dL   PSA, screen   Result Value Ref Range    Prostate Specific Antigen Screen 0.40 0.00 - 4.50 ng/mL    Narrative    This result is obtained using the Roche Elecsys total PSA method on the elvis e801 immunoassay analyzer. Results obtained with different assay methods or kits cannot be used interchangeably.   Hepatic panel (Albumin, ALT, AST, Bili, Alk Phos, TP)   Result Value Ref Range    Protein Total 7.4 6.4 - 8.3 g/dL    Albumin 4.7 3.5 - 5.2 g/dL    Bilirubin Total 0.6 <=1.2 mg/dL    Alkaline Phosphatase 71 40 - 129 U/L    AST 25 0 - 45 U/L      Comment:      Reference intervals for this test were updated on 6/12/2023 to more accurately reflect our healthy population. There may be differences in the flagging  of prior results with similar values performed with this method. Interpretation of those prior results can be made in the context of the updated reference intervals.    ALT 29 0 - 70 U/L      Comment:      Reference intervals for this test were updated on 6/12/2023 to more accurately reflect our healthy population. There may be differences in the flagging of prior results with similar values performed with this method. Interpretation of those prior results can be made in the context of the updated reference intervals.      Bilirubin Direct <0.20 0.00 - 0.30 mg/dL       If you have any questions or concerns, please call the clinic at the number listed above.       Sincerely,      [facility] team

## 2023-09-06 NOTE — TELEPHONE ENCOUNTER
Prior Authorization Retail Medication Request    Medication/Dose: Freestyle elfego requires PA.   ICD code (if different than what is on RX):  Paulino  Previously Tried and Failed:  Unknown  Rationale:  Elfego requires PA          Pharmacy Information (if different than what is on RX)  Name:  Same  Phone:  Same

## 2023-09-06 NOTE — TELEPHONE ENCOUNTER
"----- Message from Amanda Osorio MD sent at 9/6/2023  6:59 AM CDT -----  Please call pt with results    Normal for total cholesterol, LDL, continue with current medication.  Normal for kidney function, liver function.  Normal for PSA, prostate-specific antigen.  Blood sugar, at the higher side.  A1c at the higher side.  Advised patient on diet, exercise, increase physical activity.    Advised patient to start taking metformin 1 tablet twice a day after breakfast, and after supper.    Follow-up in 3 months, for diabetes check up.    Thanks  Ways to improve your cholesterol...     1- Eats less saturated fats (including avoiding \"trans\" fats), fatty foods.  Eat more baked food, than fried food.     2 - Eat more unsaturated fats  - found in vegetables, grains, and tree nuts.  Also by replacing butter with canola oil or olive oil.     3 - Eat more nuts.  1-2 ounces (a small handful) of almonds, walnuts, hazelnuts or pecans once a day in place of other less healthy snacks.     4 - Eat more high fiber foods - vegetables and whole grains including oat bran, oats, beans, peas, and flax seed.     5 - Eat more fish - such as salmon, tuna, mackerel, and sardines.  1 or 2 six ounce servings per week is a healthy replacement for other proteins.     6 - Exercise for at least 120 minutes per week - which is equal to 30 minutes 4 days per week.    7- avoid late meals, at least  a few hours before bedtime.         "

## 2023-09-07 NOTE — TELEPHONE ENCOUNTER
Attempt #2 to call patient.     RN left voicemail and requested return call to [facility] at [phone].     Nayla Fernandez RN, BSN  [facility]: [place]

## 2023-09-11 NOTE — TELEPHONE ENCOUNTER
Central Prior Authorization Team   Phone: [phone]    PA Initiation    Medication: Freestyle alis requires PA.   Insurance Company: [employer] ([facility]) - Phone [phone] Fax [phone]  Pharmacy Filling the Rx: [facility] - [place], [place] - [address] [place] [address] [place].  Filling Pharmacy Phone: [phone]  Filling Pharmacy Fax:    Start Date: 9/11/2023

## 2023-09-13 NOTE — TELEPHONE ENCOUNTER
PRIOR AUTHORIZATION DENIED    Medication: Freestyle alis requires PA.     Denial Date: 9/13/2023    Denial Rational: Excluded from pharmacy benefit, not covered under plan          Appeal Information: This medication was denied. If physician would like to appeal because patient has contraindication or allergy to covered medication please write letter of medical necessity and route back to PA team to initiate.  If no further action is needed please close encounter thank you.

## 2023-10-02 ENCOUNTER — OFFICE VISIT (OUTPATIENT)
Dept: FAMILY MEDICINE | Facility: CLINIC | Age: 63
End: 2023-10-02
Payer: COMMERCIAL

## 2023-10-02 VITALS
HEART RATE: 89 BPM | DIASTOLIC BLOOD PRESSURE: 90 MMHG | SYSTOLIC BLOOD PRESSURE: 152 MMHG | WEIGHT: 239 LBS | BODY MASS INDEX: 34.22 KG/M2 | OXYGEN SATURATION: 98 % | HEIGHT: 70 IN | TEMPERATURE: 98.4 F

## 2023-10-02 DIAGNOSIS — H10.32 ACUTE CONJUNCTIVITIS OF LEFT EYE, UNSPECIFIED ACUTE CONJUNCTIVITIS TYPE: ICD-10-CM

## 2023-10-02 DIAGNOSIS — R05.1 ACUTE COUGH: ICD-10-CM

## 2023-10-02 DIAGNOSIS — J02.9 SORE THROAT: Primary | ICD-10-CM

## 2023-10-02 LAB
DEPRECATED S PYO AG THROAT QL EIA: NEGATIVE
GROUP A STREP BY PCR: NOT DETECTED

## 2023-10-02 PROCEDURE — 87651 STREP A DNA AMP PROBE: CPT | Performed by: PHYSICIAN ASSISTANT

## 2023-10-02 PROCEDURE — 99214 OFFICE O/P EST MOD 30 MIN: CPT | Performed by: PHYSICIAN ASSISTANT

## 2023-10-02 RX ORDER — ALBUTEROL SULFATE 90 UG/1
1-2 AEROSOL, METERED RESPIRATORY (INHALATION) EVERY 4 HOURS PRN
Qty: 6.7 G | Refills: 0 | Status: SHIPPED | OUTPATIENT
Start: 2023-10-02 | End: 2023-10-02

## 2023-10-02 RX ORDER — BENZONATATE 100 MG/1
100 CAPSULE ORAL 3 TIMES DAILY PRN
Qty: 25 CAPSULE | Refills: 0 | Status: SHIPPED | OUTPATIENT
Start: 2023-10-02 | End: 2023-10-02

## 2023-10-02 RX ORDER — ALBUTEROL SULFATE 90 UG/1
1-2 AEROSOL, METERED RESPIRATORY (INHALATION) EVERY 4 HOURS PRN
Qty: 6.7 G | Refills: 1 | Status: SHIPPED | OUTPATIENT
Start: 2023-10-02 | End: 2024-05-21

## 2023-10-02 RX ORDER — POLYMYXIN B SULFATE AND TRIMETHOPRIM 1; 10000 MG/ML; [USP'U]/ML
1-2 SOLUTION OPHTHALMIC EVERY 4 HOURS
Qty: 10 ML | Refills: 0 | Status: SHIPPED | OUTPATIENT
Start: 2023-10-02 | End: 2024-05-21

## 2023-10-02 RX ORDER — BENZONATATE 100 MG/1
100 CAPSULE ORAL 3 TIMES DAILY PRN
Qty: 25 CAPSULE | Refills: 1 | Status: SHIPPED | OUTPATIENT
Start: 2023-10-02 | End: 2024-05-21

## 2023-10-02 NOTE — LETTER
October 2, 2023      Abhi ADAM Cm  [address]  [place] [address]        Dear ,    We are writing to inform you of your test results.    Your test results fall within the expected range(s) or remain unchanged from previous results.  Please continue with current treatment plan.    Resulted Orders   Streptococcus A Rapid Screen w/Reflex to PCR - Clinic Collect   Result Value Ref Range    Group A Strep antigen Negative Negative   Group A Streptococcus PCR Throat Swab   Result Value Ref Range    Group A strep by PCR Not Detected Not Detected    Narrative    The Xpert Xpress Strep A test, performed on the [employer], is a rapid, qualitative in vitro diagnostic test for the detection of Streptococcus pyogenes (Group A ß-hemolytic Streptococcus, Strep A) in throat swab specimens from patients with signs and symptoms of pharyngitis. The Xpert Xpress Strep A test can be used as an aid in the diagnosis of Group A Streptococcal pharyngitis. The assay is not intended to monitor treatment for Group A Streptococcus infections. The Xpert Xpress Strep A test utilizes an automated real-time polymerase chain reaction (PCR) to detect Streptococcus pyogenes DNA.       If you have any questions or concerns, please call the clinic at the number listed above.       Sincerely,      MIAN Bryant

## 2023-10-02 NOTE — LETTER
October 2, 2023      Abhi Pabon  [address] [facility] [address]  [place] [address]        To Whom It May Concern:    Abhi Pabon  was seen on 10/2/23.  Please excuse him  until 10/6/23 due to illness unless he feels well enough to return sooner.        Sincerely,        MIAN Bryant

## 2023-10-02 NOTE — PATIENT INSTRUCTIONS
Tita Moe,    Thank you for allowing [facility] to manage your care.    I am unsure of the cause of your symptoms, but your exam is reassuring. This is likely a viral upper respiratory infection. We will see what our workup shows. I will call you if your strep test is positive. Do a home COVID test.     If you develop worsening/changing symptoms at any time, please call 911 or go to the emergency department for evaluation as we discussed.    I sent your prescriptions to your pharmacy.    For cough not controlled by other medications, please use benzonatate as prescribed. Do not use this medication while driving, operating machinery, with other sedating medications, or while drinking alcohol as it will make you drowsy.    Please allow 1-2 business days for our office to contact you in regards to your laboratory/radiological studies.  If not done so, I encourage you to login into [email or web address] [email or web address] to review your results as well.     Drink 8-10 glasses of fluid daily to stay well-hydrated.    Your blood pressure was high today. Take and record your blood pressure twice daily for 2 weeks. If your blood pressure is greater than or equal to 140/90mm Hg on average, please contact us.    If you have any questions or concerns, please feel free to call us at [phone]    Sincerely,    Yassine Mosher PA-C    Did you know?      You can schedule a video visit for follow-up appointments as well as future appointments for certain conditions.  Please see the below link.     [email or web address]    If you have not already done so,  I encourage you to sign up for [employer] ([email or web address]).  This will allow you to review your results, securely communicate with a provider, and schedule virtual visits as well.

## 2023-10-02 NOTE — PROGRESS NOTES
Assessment & Plan   Problem List Items Addressed This Visit    None  Visit Diagnoses       Sore throat    -  Primary    Relevant Orders    Streptococcus A Rapid Screen w/Reflex to PCR - Clinic Collect    Acute conjunctivitis of left eye, unspecified acute conjunctivitis type        Relevant Medications    trimethoprim-polymyxin b (POLYTRIM) 79674-7.1 UNIT/ML-% ophthalmic solution    Other Relevant Orders    EYE EXAM (SIMPLE-NONBILLABLE) (Completed)    Acute cough        Relevant Medications    albuterol (PROAIR HFA/PROVENTIL HFA/VENTOLIN HFA) 108 (90 Base) MCG/ACT inhaler    benzonatate (TESSALON) 100 MG capsule           Impression is left eye conjunctivitis likely in the context of a viral URI including COVID-19. Will do a home COVID-19 test. Strep rapid Ag test neg. Appears well and non-toxic and I have low suspicion for impending airway obstruction or respiratory distress at this point.  He will push p.o. fluids, use over-the-counter meds for symptoms, complete a course of Polytrim course to cover bacterial conjunctivitis, albuterol inhaler, benzonatate and follow-up with us in 1-2 weeks if not improving or urgent care/the ER if symptoms worsen/change at any time.    Complete history and physical exam as below. Afebrile with normal vital signs except for elevated bp, which they will monitor at home and contact us if >140/90mmHg on average.    DDx and Dx discussed with and explained to the pt to their satisfaction.  All questions were answered at this time. Pt expressed understanding of and agreement with this dx, tx, and plan. No further workup warranted and standard medication warnings given. I have given the patient a list of pertinent indications for re-evaluation. Will go to the Emergency Department if symptoms worsen or new concerning symptoms arise. Patient left in no apparent distress.     Ordering of each unique test  Prescription drug management  32 minutes spent by me on the date of the encounter  "doing chart review, history and exam, documentation and further activities per the note     See Patient Instructions    MIAN Bryant [facility] JANNETTE Moe is a 63 year old, presenting for the following health issues:  URI        10/2/2023     9:04 AM   Additional Questions   Roomed by Lidia MCCANN         10/2/2023     9:04 AM   Patient Reported Additional Medications   Patient reports taking the following new medications No new medications to add       History of Present Illness       Reason for visit:  Percecent cough pink eye and scratchy throat  Symptom onset:  3-7 days ago  Symptoms include:  Cough scratchy throat and pink eye  Symptom intensity:  Moderate  Symptom progression:  Staying the same  Had these symptoms before:  No  What makes it worse:  Eating  What makes it better:  Cold medicine    He eats 4 or more servings of fruits and vegetables daily.He consumes 1 sweetened beverage(s) daily.He exercises with enough effort to increase his heart rate 30 to 60 minutes per day.  He exercises with enough effort to increase his heart rate 5 days per week. He is missing 1 dose(s) of medications per week.  He is not taking prescribed medications regularly due to other.     5 days of left sided pink eye with morning crusting, itchy/scratchy throat, cough, cold symptoms. Vision is normal. Does not wear contact lenses. Coughing keeps him from sleeping. No tobacco use. Eating and drinking well. No sob, chest pain, other symptoms. Coworkers have been sick, one of them had COVID 3 weeks ago. Works as a  for [employer].     Review of Systems   Constitutional, HEENT, cardiovascular, pulmonary, gi and gu systems are negative, except as otherwise noted.      Objective    BP (!) 152/90 (BP Location: Left arm, Patient Position: Sitting, Cuff Size: Adult Large)   Pulse 89   Temp 98.4  F (36.9  C) (Tympanic)   Ht 1.77 m (5' 9.69\")   Wt 108.4 kg (239 lb)   SpO2 98%   BMI " 34.60 kg/m    Body mass index is 34.6 kg/m .  Physical Exam  Vitals and nursing note reviewed.   Constitutional:       General: He is not in acute distress.     Appearance: He is not ill-appearing or diaphoretic.   HENT:      Head: Normocephalic and atraumatic.      Nose: Congestion present.      Mouth/Throat:      Mouth: Mucous membranes are moist.      Pharynx: Oropharynx is clear.   Eyes:      General: No scleral icterus.        Right eye: No discharge.         Left eye: No discharge.      Extraocular Movements: Extraocular movements intact.      Pupils: Pupils are equal, round, and reactive to light.      Comments: Wears glasses: NOT worn for testing (patient did not have glasses with him today)  Tool used: Jackson   Right eye:        10/16 (20/32)   Left eye:          10/20 (20/40)    Left eye: conjunctiva slightly injected with no discharge. No edema or erythema to the orbital region. No proptosis, foreign body or other worrisome findings on exam.       Cardiovascular:      Rate and Rhythm: Normal rate and regular rhythm.      Heart sounds: Normal heart sounds. No murmur heard.     No friction rub. No gallop.   Pulmonary:      Effort: Pulmonary effort is normal. No respiratory distress.      Breath sounds: Normal breath sounds. No stridor. No wheezing, rhonchi or rales.   Skin:     General: Skin is warm and dry.   Neurological:      General: No focal deficit present.      Mental Status: He is alert. Mental status is at baseline.   Psychiatric:         Mood and Affect: Mood normal.         Behavior: Behavior normal.      Strep pending

## 2023-11-28 DIAGNOSIS — L03.011 CELLULITIS OF FINGER OF RIGHT HAND: ICD-10-CM

## 2023-11-28 NOTE — TELEPHONE ENCOUNTER
Patient came in to clinic, he needs to be filled asap before he goes to work tomorrow      Verenice Oates    [facility]

## 2023-11-29 RX ORDER — DOXYCYCLINE 100 MG/1
100 CAPSULE ORAL 2 TIMES DAILY
Qty: 20 CAPSULE | Refills: 0 | OUTPATIENT
Start: 2023-11-29

## 2023-11-29 NOTE — TELEPHONE ENCOUNTER
Pt needs an appt to discuss with [noemy why he needs this medication refilled     Tc called left VM message to relay message

## 2023-12-11 NOTE — TELEPHONE ENCOUNTER
Called patient and the reason patient was requesting this medication was because he had cracks in his fingers.  Sometimes the cracks end up getting infected and patient thought that was happening so that was why he was requesting a refill of this medication.  Patient decided to do heavy garlic and they seem to help the cracks.  At this time the patient does not need an antibiotic.    AISLINN Hernandez  [facility]

## 2024-01-11 ENCOUNTER — OFFICE VISIT (OUTPATIENT)
Dept: FAMILY MEDICINE | Facility: CLINIC | Age: 64
End: 2024-01-11
Payer: COMMERCIAL

## 2024-01-11 VITALS
SYSTOLIC BLOOD PRESSURE: 134 MMHG | OXYGEN SATURATION: 96 % | DIASTOLIC BLOOD PRESSURE: 90 MMHG | WEIGHT: 250.8 LBS | TEMPERATURE: 97.5 F | HEIGHT: 70 IN | BODY MASS INDEX: 35.9 KG/M2 | RESPIRATION RATE: 21 BRPM | HEART RATE: 86 BPM

## 2024-01-11 DIAGNOSIS — Z23 NEED FOR PROPHYLACTIC VACCINATION AND INOCULATION AGAINST INFLUENZA: ICD-10-CM

## 2024-01-11 DIAGNOSIS — I10 HYPERTENSION GOAL BP (BLOOD PRESSURE) < 140/90: Primary | ICD-10-CM

## 2024-01-11 DIAGNOSIS — E66.01 MORBID OBESITY (H): ICD-10-CM

## 2024-01-11 DIAGNOSIS — N18.2 CKD (CHRONIC KIDNEY DISEASE) STAGE 2, GFR 60-89 ML/MIN: ICD-10-CM

## 2024-01-11 DIAGNOSIS — E11.65 TYPE 2 DIABETES MELLITUS WITH HYPERGLYCEMIA, WITHOUT LONG-TERM CURRENT USE OF INSULIN (H): ICD-10-CM

## 2024-01-11 LAB — HBA1C MFR BLD: 9.4 % (ref 0–5.6)

## 2024-01-11 PROCEDURE — 99214 OFFICE O/P EST MOD 30 MIN: CPT | Mod: 25 | Performed by: NURSE PRACTITIONER

## 2024-01-11 PROCEDURE — 83036 HEMOGLOBIN GLYCOSYLATED A1C: CPT | Performed by: NURSE PRACTITIONER

## 2024-01-11 PROCEDURE — 90471 IMMUNIZATION ADMIN: CPT | Performed by: NURSE PRACTITIONER

## 2024-01-11 PROCEDURE — 36415 COLL VENOUS BLD VENIPUNCTURE: CPT | Performed by: NURSE PRACTITIONER

## 2024-01-11 PROCEDURE — 90686 IIV4 VACC NO PRSV 0.5 ML IM: CPT | Performed by: NURSE PRACTITIONER

## 2024-01-11 RX ORDER — LISINOPRIL 10 MG/1
10 TABLET ORAL DAILY
Qty: 90 TABLET | Refills: 0 | Status: SHIPPED | OUTPATIENT
Start: 2024-01-11 | End: 2024-04-10

## 2024-01-11 RX ORDER — FLASH GLUCOSE SENSOR
KIT MISCELLANEOUS
Qty: 14 EACH | Refills: 1 | Status: SHIPPED | OUTPATIENT
Start: 2024-01-11 | End: 2024-01-16

## 2024-01-11 ASSESSMENT — PAIN SCALES - GENERAL: PAINLEVEL: NO PAIN (0)

## 2024-01-11 NOTE — NURSING NOTE
Prior to immunization administration, verified patients identity using patient s name and date of birth. Please see Immunization Activity for additional information.     Screening Questionnaire for Adult Immunization    Are you sick today?   No   Do you have allergies to medications, food, a vaccine component or latex?   No   Have you ever had a serious reaction after receiving a vaccination?   No   Do you have a long-term health problem with heart, lung, kidney, or metabolic disease (e.g., diabetes), asthma, a blood disorder, no spleen, complement component deficiency, a cochlear implant, or a spinal fluid leak?  Are you on long-term aspirin therapy?   No   Do you have cancer, leukemia, HIV/AIDS, or any other immune system problem?   No   Do you have a parent, brother, or sister with an immune system problem?   No   In the past 3 months, have you taken medications that affect  your immune system, such as prednisone, other steroids, or anticancer drugs; drugs for the treatment of rheumatoid arthritis, Crohn s disease, or psoriasis; or have you had radiation treatments?   No   Have you had a seizure, or a brain or other nervous system problem?   No   During the past year, have you received a transfusion of blood or blood    products, or been given immune (gamma) globulin or antiviral drug?   No   For women: Are you pregnant or is there a chance you could become       pregnant during the next month?   No   Have you received any vaccinations in the past 4 weeks?   No     Immunization questionnaire answers were all negative.      Patient instructed to remain in clinic for 15 minutes afterwards, and to report any adverse reactions.     Screening performed by Jennifer Aldrich MA on 1/11/2024 at 11:53 AM.

## 2024-01-11 NOTE — LETTER
January 17, 2024      Abhi Pabon  [address]  [place] [address]              Dear Abhi,     I recommend you get the continuous glucose monitor and start monitoring blood sugar.  I recommend you follow up with diabetes education to help with getting set up for continuous monitor and to discuss diet.  Recommend increasing Metformin to 1500 mg for one week, and then if tolerating go up to 2,000 mg daily.  Follow-up with PCP in 1 month for BP recheck.     If you have any questions or concerns please feel free to contact me at the office at [phone] or via [employer].      Sincerely,        Ines Mnote NP      Results for orders placed or performed in visit on 01/11/24   HEMOGLOBIN A1C     Status: Abnormal   Result Value Ref Range    Hemoglobin A1C 9.4 (H) 0.0 - 5.6 %         
none

## 2024-01-11 NOTE — PROGRESS NOTES
Assessment & Plan     Hypertension goal BP (blood pressure) < 140/90  Uncontrolled  - Continue Amlodipine 10 mg daily.  - Increase from 5 mg to lisinopril (ZESTRIL) 10 MG tablet; Take 1 tablet (10 mg) by mouth daily  - Follow-up in 4 weeks for recheck.    Type 2 diabetes mellitus with hyperglycemia, without long-term current use of insulin (H)  Uncontrolled    Hemoglobin A1C 9.4 today resulted after patient left clinic.  Will have my team call patient to recommend increasing Metformin to 1500 mg daily for 1 week then increase to Metformin 2,000 mg daily.  Also recommend follow up with diabetes education to help him get the continuous glucose monitor set up and discuss diet.    - HEMOGLOBIN A1C; Future  - continuous blood glucose monitoring (FREESTYLE WILLAM) sensor; For use with Freestyle Willam Flash  for continuous monitioring of blood glucose levels. Replace sensor every 10 days.  - HEMOGLOBIN A1C  - Increase dosage from 1,000 mg to metFORMIN (GLUCOPHAGE XR) 500 MG 24 hr tablet; Take 4 tablets (2,000 mg) by mouth daily (with dinner)  - Adult Diabetes Education  Referral; Future    Need for prophylactic vaccination and inoculation against influenza    - INFLUENZA VACCINE IM > 6 MONTHS VALENT IIV4 (AFLURIA/FLUZONE)    Morbid obesity (H)  Known issue that I take into account for their medical decisions, no current exacerbations or new concerns.     CKD (chronic kidney disease) stage 2, GFR 60-89 ml/min  Known issue that I take into account for their medical decisions, no current exacerbations or new concerns.   Getting BP and blood sugars under better control to help prevention.    Follow up in 1 month with PCP.                 Ines Monte NP  [facility]    Constantino Moe is a 63 year old, presenting for the following health issues:  Hypertension (Was high at Dental Office yesterday. )      History of Present Illness       Hypertension: He presents for follow  "up of hypertension.  He does check blood pressure  regularly outside of the clinic. Outside blood pressures have been over 140/90. He follows a low salt diet.     He eats 2-3 servings of fruits and vegetables daily.He consumes 0 sweetened beverage(s) daily.He exercises with enough effort to increase his heart rate 20 to 29 minutes per day.  He exercises with enough effort to increase his heart rate 5 days per week. He is missing 1 dose(s) of medications per week.  He is not taking prescribed medications regularly due to remembering to take.    Says his BP is always high at the dentist every 6 months.  Last was at dentist yesterday and was /110 and was told to see primary care for follow up asap.    Amlodipine 10 mg - taking  Lisinopril 5 mg - taking     Supplements he is taking Fish oil, CoQ 10    Works as a  for [employer].  Says he is moving a lot at work.    Diabetes:  Not monitoring blood sugars.      Review of Systems   Constitutional, HEENT, cardiovascular, pulmonary, gi and gu systems are negative, except as otherwise noted.      Objective    BP (!) 134/90   Pulse 86   Temp 97.5  F (36.4  C) (Oral)   Resp 21   Ht 1.768 m (5' 9.6\")   Wt 113.8 kg (250 lb 12.8 oz)   SpO2 96%   BMI 36.40 kg/m    Body mass index is 36.4 kg/m .  Physical Exam   GENERAL: healthy, alert and no distress  CV: regular rates and rhythm, normal S1 S2, no S3 or S4, and no murmur, click or rub  PSYCH: mentation appears normal, affect normal/bright    Results for orders placed or performed in visit on 01/11/24   HEMOGLOBIN A1C     Status: Abnormal   Result Value Ref Range    Hemoglobin A1C 9.4 (H) 0.0 - 5.6 %                     "

## 2024-01-11 NOTE — COMMUNITY RESOURCES LIST (ENGLISH)
01/11/2024   [facility] [employer]  N/A  For questions about this resource list or additional care needs, please contact your primary care clinic or care manager.  Phone: [phone]   Email: N/A   Address: [address] [place] [address]   Hours: N/A        Financial Stability       Utility payment assistance  1  [place] [employer] [facility] - [employer] and Utilities Distance: 0.27 miles      In-Person, Phone/Virtual   [address] [place] [address]  Language: English  Hours: Mon - Fri 8:30 AM - 4:30 PM  Fees: Free   Phone: (215) 112-4101 Website: https://[place].[facility]/[employer]/energy/consumer-assistance/energy-assistance-program/     2  [facility] Distance: 0.34 miles      In-Person, Phone/Virtual   [address] [place] [address]  Language: English  Hours: Mon - Fri 8:00 AM - 4:30 PM  Fees: Free   Phone: (386) 280-4285 Email: luz@[email or web address]. Website: https://www.[facility] [employer]./your-government/departments/health-and-wellness          Important Numbers & Websites       Emergency Services   911  [facility] Services   311  Poison Control   (126) 903-9033  Suicide Prevention Lifeline   (202) 929-7760 (TALK)  Child Abuse Hotline   (738) 409-3683 (4-A-Child)  Sexual Assault Hotline   (381) 791-6650 (HOPE)  National Runaway Safeline   (246) 442-4821 (RUNAWAY)  All-Options Talkline   (657) 312-5092  Substance Abuse Referral   (701) 964-1559 (HELP)

## 2024-01-14 PROBLEM — N18.2 CHRONIC KIDNEY DISEASE, STAGE 2 (MILD): Status: ACTIVE | Noted: 2024-01-14

## 2024-01-14 RX ORDER — METFORMIN HCL 500 MG
2000 TABLET, EXTENDED RELEASE 24 HR ORAL
Qty: 360 TABLET | Refills: 0 | Status: SHIPPED | OUTPATIENT
Start: 2024-01-14 | End: 2024-05-21

## 2024-01-15 ENCOUNTER — TELEPHONE (OUTPATIENT)
Dept: FAMILY MEDICINE | Facility: CLINIC | Age: 64
End: 2024-01-15
Payer: COMMERCIAL

## 2024-01-15 NOTE — TELEPHONE ENCOUNTER
----- Message from Ines Monte NP sent at 1/14/2024  7:31 PM CST -----  Please call patient.  His hemoglobin A1C has risen to 9.4.  I recommend he gets the continuous glucose monitor and starts monitoring blood sugar.  I recommend he follows up with diabetes education to help with getting set up for continuous monitor and to discuss diet.  Recommend increasing Metformin to 1500 mg for one week, and then if tolerating go up to 2,000 mg daily.  Follow-up with PCP in 1 month for BP recheck.    If you have any questions or concerns please feel free to contact me at the office at [phone] or via [employer].    Ines Monte, MILADIS, APRN, CNP

## 2024-01-16 ENCOUNTER — TELEPHONE (OUTPATIENT)
Dept: FAMILY MEDICINE | Facility: CLINIC | Age: 64
End: 2024-01-16
Payer: COMMERCIAL

## 2024-01-16 NOTE — TELEPHONE ENCOUNTER
Attempt #2 to contact patient.    RN left  for patient requesting call back to clinic.    Eulalio Eden RN, BSN, PHN  [facility]

## 2024-01-16 NOTE — TELEPHONE ENCOUNTER
Prior Authorization Retail Medication Request    Medication/Dose: Dexcom supplies need pa  Diagnosis and ICD code (if different than what is on RX):  same  New/renewal/insurance change PA/secondary ins. PA: same   Previously Tried and Failed:   Unknown   Rationale:  dexcom supplies need pa    Insurance   Primary:   Insurance ID:    Secondary (if applicable):   Insurance ID:      Pharmacy Information (if different than what is on RX)  Name:  same  Phone:  same  Fax:same

## 2024-01-17 NOTE — TELEPHONE ENCOUNTER
Routing to Team    Please send patient letter with results and message.    Eulalio Eden, RN, BSN, PHN  [facility]    .kb

## 2024-01-25 NOTE — TELEPHONE ENCOUNTER
PA Initiation    Medication: DEXCOM G7  [facility]  Insurance Company: [employer] - Phone [phone] Fax [phone]  [facility] [address]  Filling Pharmacy Phone: [phone]  Filling Pharmacy Fax:    Start Date: 1/25/2024

## 2024-01-26 NOTE — TELEPHONE ENCOUNTER
PRIOR AUTHORIZATION DENIED    Medication: DEXCOM G7  [facility]  Insurance Company: [employer] - Phone [phone] Fax [phone]  Denial Date: 1/25/2024  Denial Reason(s): PT DOES NOT MEET COVERAGE CRITERIA: Intensive insulin regimen    Appeal Information:     Patient Notified: NO

## 2024-01-31 ENCOUNTER — TELEPHONE (OUTPATIENT)
Dept: FAMILY MEDICINE | Facility: CLINIC | Age: 64
End: 2024-01-31

## 2024-01-31 ENCOUNTER — ALLIED HEALTH/NURSE VISIT (OUTPATIENT)
Dept: EDUCATION SERVICES | Facility: CLINIC | Age: 64
End: 2024-01-31
Attending: NURSE PRACTITIONER
Payer: COMMERCIAL

## 2024-01-31 DIAGNOSIS — E11.65 TYPE 2 DIABETES MELLITUS WITH HYPERGLYCEMIA, WITHOUT LONG-TERM CURRENT USE OF INSULIN (H): ICD-10-CM

## 2024-01-31 PROCEDURE — G0108 DIAB MANAGE TRN  PER INDIV: HCPCS | Mod: AE | Performed by: DIETITIAN, REGISTERED

## 2024-01-31 RX ORDER — BLOOD-GLUCOSE SENSOR
1 EACH MISCELLANEOUS
Qty: 2 EACH | Refills: 11 | Status: SHIPPED | OUTPATIENT
Start: 2024-01-31 | End: 2024-02-21

## 2024-01-31 NOTE — PROGRESS NOTES
Diabetes Self-Management Education & Support    Presents for: Individual review    Type of Service: In Person Visit      ASSESSMENT:  Patient arrived at 3:30pm but since was advised to get here at 3:15pm, was not immediately arrived and CDE did not have Teams in exam room so could not see message from  asking about this so visit started later. Reviewed recent A1C  and patient surprised this high. Felt metformin-xr was causing issues with hunger and feeling tired so did not initially go up to 2 tablets. He was not aware high blood glucose contribute to these symptoms and that his dose of Metformin was increased to 4 tablets daily (2000 mg) so will have him taper up to this by going up to 3 tablets (1500 mg) today and if tolerating, in 1 week, go up to 4 tablets (2000 mg). He was told ok to take all at once since on extended release or divide between AM and PM.    Discussed physiology of diabetes and purpose of checking blood glucose. Patient has dry fingers that crack easily so does not want to use meter to check blood glucose. Recent request for Dexcom denied but informed patient Elfego 3 limits cost to $75 a month. This is still too much for him so will order and see if he can buy one at a time or possibly save up and wear once sensor a month. Since he is unsure if/when he will get the sensor, he will call for a follow up in 1 month if able to buy and wear it.    Reviewed carbohydrate sources and instructed on carbohydrate counting and label reading.  Provided general recommendations for him to consume 30-60 gm carbs at meals and limit snacks to 15-30 grams.  Cautioned against under-consuming carbs since may cause liver to produce glucose, leading to erratic blood glucose levels.  Illustrated the plate method and used food models to help demonstrate portion control.  Patient to receive additional education when returns for follow up.  Pt seems receptive and verbalized understanding of concepts discussed  and recommendations provided.        Patient's most recent   Lab Results   Component Value Date    A1C 9.4 01/11/2024    A1C 7.7 01/26/2021     is not meeting goal of <7.0    Diabetes knowledge and skills assessment:   Patient is knowledgeable in diabetes management concepts related to: Taking Medication    Continue education with the following diabetes management concepts: Healthy Eating, Being Active, Monitoring, Taking Medication, Problem Solving, Reducing Risks, and Healthy Coping    Based on learning assessment above, most appropriate setting for further diabetes education would be: Individual setting.      PLAN    -Increase Metformin to 3 tablets (1500 mg) tonight. If tolerating in 1 week, go up to 4 tablets (2000 mg).  -See if it works to get the Elfego 3  -Try to spread out carbohydrate intake throughout the day  -Try to move around after dinner    Topics to cover at upcoming visits: Taking Medication, Problem Solving, and Reducing Risks    Follow-up: 1-2 months if gets Elfego    See Care Plan for co-developed, patient-state behavior change goals.  AVS provided for patient today.    Education Materials Provided:  [facility] Understanding Diabetes Booklet      SUBJECTIVE/OBJECTIVE:  Presents for: Individual review  Accompanied by: Self  Diabetes education in the past 24mo: No  Diabetes type: Type 2  Diabetes management related comments/concerns: Says he did not increase the Metformin in the past since her felt tired and hungry.  Transportation concerns: No  Difficulty affording diabetes medication?: No  Cultural Influences/Ethnic Background:  Not  or       Diabetes Symptoms & Complications:          Patient Problem List and Family Medical History reviewed for relevant medical history, current medical status, and diabetes risk factors.    Vitals:  There were no vitals taken for this visit.  Estimated body mass index is 36.4 kg/m  as calculated from the following:    Height as of 1/11/24:  "1.768 m (5' 9.6\").    Weight as of 1/11/24: 113.8 kg (250 lb 12.8 oz).   Last 3 BP:   BP Readings from Last 3 Encounters:   01/11/24 (!) 134/90   10/02/23 (!) 152/90   09/05/23 130/78       History   Smoking Status    Never   Smokeless Tobacco    Never       Labs:  Lab Results   Component Value Date    A1C 9.4 01/11/2024    A1C 7.7 01/26/2021     Lab Results   Component Value Date     09/05/2023     07/18/2022     09/14/2020     Lab Results   Component Value Date    LDL 89 09/05/2023     05/13/2021     HDL Cholesterol   Date Value Ref Range Status   05/13/2021 60 >39 mg/dL Final     Direct Measure HDL   Date Value Ref Range Status   09/05/2023 43 >=40 mg/dL Final   ]  GFR Estimate   Date Value Ref Range Status   09/05/2023 89 >60 mL/min/1.73m2 Final   09/14/2020 >90 >60 mL/min/[1.73_m2] Final     Comment:     Non  GFR Calc  Starting 12/18/2018, serum creatinine based estimated GFR (eGFR) will be   calculated using the Chronic Kidney Disease Epidemiology Collaboration   (CKD-EPI) equation.       GFR Estimate If Black   Date Value Ref Range Status   09/14/2020 >90 >60 mL/min/[1.73_m2] Final     Comment:      GFR Calc  Starting 12/18/2018, serum creatinine based estimated GFR (eGFR) will be   calculated using the Chronic Kidney Disease Epidemiology Collaboration   (CKD-EPI) equation.       Lab Results   Component Value Date    CR 0.96 09/05/2023    CR 0.88 09/14/2020     No results found for: \"MICROALBUMIN\"    Healthy Eating:  Healthy Eating Assessed Today: Yes  Meals include: Breakfast, Lunch, Dinner  Breakfast: oatmeal  Lunch: None on days off OR sandwich, chips, apple, banana  Dinner: chicken, chips, apple, banana  Snacks: Dark chocolate covered almonds (5-10)  Beverages: Water, Tea (Green tea)    Being Active:  Being Active Assessed Today: Yes  Exercise:: Yes  Days per week of moderate to strenuous exercise (like a brisk walk): 4 (active at work - 10-15 " miles a day at work and 8-12 flights of stairs)    Monitoring:  Monitoring Assessed Today: Yes  Did patient bring glucose meter to appointment? : No  Times checking blood sugar at home (number): Never      Taking Medications:  Diabetes Medication(s)       Biguanides       metFORMIN (GLUCOPHAGE XR) 500 MG 24 hr tablet Take 4 tablets (2,000 mg) by mouth daily (with dinner)            Taking Medication Assessed Today: Yes  Current Treatments: Oral Medication (taken by mouth)  Problems taking diabetes medications regularly?: Yes (Thought he was only supposed to take 2 metformin a day.)    Problem Solving:  Problem Solving Assessed Today: No  Is the patient at risk for hypoglycemia?: No              Reducing Risks:  Reducing Risks Assessed Today: No    Healthy Coping:  Healthy Coping Assessed Today: Yes  Emotional response to diabetes: Ready to learn  Stage of change: PREPARATION (Decided to change - considering how)  Patient Activation Measure Survey Score:       No data to display                  Care Plan and Education Provided:  Care Plan: Diabetes   Updates made by Elizabeth Small RD since 1/31/2024 12:00 AM        Problem: HbA1C Not In Goal         Goal: Establish Regular Follow-Ups with PCP         Task: Discuss with PCP the recommended timing for patient's next follow up visit(s)    Responsible User: Elizabeth Small RD        Task: Discuss schedule for PCP visits with patient Completed 1/31/2024   Responsible User: Elizabeth Small RD        Goal: Get HbA1C Level in Goal         Task: Educate patient on diabetes education self-management topics    Responsible User: Elizabeth Small RD        Task: Educate patient on benefits of regular glucose monitoring Completed 1/31/2024   Responsible User: Elizabeth Small RD        Task: Refer patient to appropriate extended care team member, as needed (Medication Therapy Management, Behavioral Health, Physical Therapy, etc.)    Responsible User: Elizabeth Small RD         Task: Discuss diabetes treatment plan with patient    Responsible User: Elizabeth Small RD        Problem: Diabetes Self-Management Education Needed to Optimize Self-Care Behaviors         Goal: Understand diabetes pathophysiology and disease progression         Task: Provide education on diabetes pathophysiology and disease progression specfic to patient's diabetes type Completed 1/31/2024   Responsible User: Elizabeth Small RD        Goal: Healthy Eating - follow a healthy eating pattern for diabetes         Task: Provide education on portion control and consistency in amount, composition and timing of food intake Completed 1/31/2024   Responsible User: Elizabeth Small RD        Task: Provide education on managing carbohydrate intake (carbohydrate counting, plate planning method, etc.) Completed 1/31/2024   Responsible User: Elizabeth Small RD        Task: Provide education on weight management    Responsible User: Elizabeth Small RD        Task: Provide education on heart healthy eating    Responsible User: Elizabeth Small RD        Task: Provide education on eating out    Responsible User: Elizabeth Small RD        Task: Develop individualized healthy eating plan with patient    Responsible User: Elizabeth Small RD        Goal: Being Active - get regular physical activity, working up to at least 150 minutes per week         Task: Provide education on relationship of activity to glucose and precautions to take if at risk for low glucose Completed 1/31/2024   Responsible User: Elizabeth Small RD        Task: Discuss barriers to physical activity with patient    Responsible User: Elizabeth Small RD        Task: Develop physical activity plan with patient    Responsible User: Elizabeth Small RD        Task: Explore community resources including walking groups, assistance programs, and home videos    Responsible User: Elizabeth Small RD        Goal: Monitoring - monitor glucose and ketones as directed          Task: Provide education on blood glucose monitoring (purpose, proper technique, frequency, glucose targets, interpreting results, when to use glucose control solution, sharps disposal)    Responsible User: Elizabeth Small RD        Task: Provide education on continuous glucose monitoring (sensor placement, use of candi or /reader, understanding glucose trends, alerts and alarms, differences between sensor glucose and blood glucose) Completed 1/31/2024   Responsible User: Elizabeth Small RD        Task: Provide education on ketone monitoring (when to monitor, frequency, etc.)    Responsible User: Elizabeth Small RD        Goal: Taking Medication - patient is consistently taking medications as directed    This Visit's Progress: 0%   Note:    Increase metformin to 3 tablets (1500 mg) tonight and after 1 week, go up to 4 tablets a day (2000 mg) if tolerating.       Task: Provide education on action of prescribed medication, including when to take and possible side effects Completed 1/31/2024   Responsible User: Elizabeth Small RD        Task: Provide education on insulin and injectable diabetes medications, including administration, storage, site selection and rotation for injection sites    Responsible User: Elizabeth Small RD        Task: Discuss barriers to medication adherence with patient and provide management technique ideas as appropriate    Responsible User: Elizabeth Small RD        Task: Provide education on frequency and refill details of medications    Responsible User: Elizabeth Small RD        Goal: Problem Solving - know how to prevent and manage short-term diabetes complications         Task: Provide education on high blood glucose - causes, signs/symptoms, prevention and treatment Completed 1/31/2024   Responsible User: Elizabeth mSall RD        Task: Provide education on low blood glucose - causes, signs/symptoms, prevention, treatment, carrying a carbohydrate source at all  times, and medical identification    Responsible User: Elizabeth Small RD        Task: Provide education on safe travel with diabetes    Responsible User: Elizabeth Small RD        Task: Provide education on how to care for diabetes on sick days    Responsible User: Elizabeth Small RD        Task: Provide education on when to call a health care provider    Responsible User: Elizabeth Small RD        Goal: Reducing Risks - know how to prevent and treat long-term diabetes complications         Task: Provide education on major complications of diabetes, prevention, early diagnostic measures and treatment of complications    Responsible User: Elizabeth Small RD        Task: Provide education on recommended care for dental, eye and foot health    Responsible User: Elizabeth Small RD        Task: Provide education on Hemoglobin A1c - goals and relationship to blood glucose levels    Responsible User: Elizabeth Small RD        Task: Provide education on recommendations for heart health - lipid levels and goals, blood pressure and goals, and aspirin therapy, if indicated    Responsible User: Elizabeth Small RD        Task: Provide education on tobacco cessation    Responsible User: Elizabeth Small RD        Goal: Healthy Coping - use available resources to cope with the challenges of managing diabetes         Task: Discuss recognizing feelings about having diabetes    Responsible User: Elizabeth Small RD        Task: Provide education on the benefits of making appropriate lifestyle changes    Responsible User: Elizabeth Small RD        Task: Provide education on benefits of utilizing support systems    Responsible User: Elizabeth Small RD        Task: Discuss methods for coping with stress    Responsible User: Elizabeth Small RD        Task: Provide education on when to seek professional counseling    Responsible User: Elizabeth Small RD Kaydee Brown, RDN, LD, [facility]     Time Spent: 45  minutes  Encounter Type: Individual    Any diabetes medication dose changes were made via the CDE Protocol per the patient's referring provider. A copy of this encounter was shared with the provider.

## 2024-01-31 NOTE — TELEPHONE ENCOUNTER
Prior Authorization Retail Medication Request    Medication/Dose: Freestyle Elfego 3 sensor   Diagnosis and ICD code (if different than what is on RX):  E11.65  New/renewal/insurance change PA/secondary ins. PA:  Previously Tried and Failed:    Rationale:     Insurance   Primary: [employer]   Insurance ID: [id number]     Secondary (if applicable):  Insurance ID:      Pharmacy Information (if different than what is on RX)  Name:    Phone:    Fax:

## 2024-01-31 NOTE — PATIENT INSTRUCTIONS
Starting tonight, take 3 Metformin tablets at dinner (1500 mg). After about 1 week if you are feeling ok, increase this to 4 tablets (2000 mg) at dinner.    2. Check on Level 2 with your [employer] Insurance. This may be a way to get a free meter if you are eligible for the program and it is offered.    3. If you cannot make cost of the Elfego system work right now, get A1C repeated in April.     4. Movement lowers blood glucose - can try to walk after eating.       Elfego 3 Instructions:  1. The first hour after you place the sensor is the warm up period (black out period).  You will need to carry your blood glucose meter and check blood glucose during this time.     2. Check blood sugar if feeling symptoms of hypoglycemia but meter is not displaying a low number- may be some variability between sensor and meter at times.     3. Change sensor every 14 days.     4. The Elfego 3 will pull blood glucose directly into your phone. Elfego 3 has alarms. You can adjust both the high and low blood glucose alarm (when they will go off) or turn off high blood glucose alarm if alarming too much.  You cannot turn off the critical low blood glucose alarm.     5. Watch trend arrows- will let you know if blood sugars are rising, falling or stable at the time you check.     6. It is okay to shower, bathe, and swim (up to 3 feet deep for 30 minutes) with sensor.      7. Remove the sensor if you need to have an MRI or CT scan.     8. Do not cover the sensor with extra adhesive (the small hole in the center of the sensor must remain uncovered).  If you have trouble with sensor falling off, these are approved products to help with sensor adhesion: Torbot Skin Tac, SKIN-PREP Protective Barrier Wipe and Mastisol Liquid Adhesive     9. Check the dose if you take a multivitamin or Vitamin C (ascorbic acid). You want to limit daily intake of ascorbic acid to 500 mg/day or less, or it may falsely raise sensor glucose readings. This is  more of a concern if you are on insulin or medication that can cause low blood glucose as you may miss a severe low glucose event.    Support:   If you have any trouble with use or if the sensor falls off early, call the customer service number for Elfego located on the sensor's box. They can often help troubleshoot or replace sensor if needed.   Elfego Customer Service: [phone]    Online form to request a new sensor: [email or web address]      [employer]obani:   [employer] (need to set up an account with email address)        Discount Programs:  [employer] Program ([email or web address])     To save on sensors, if told cost is more than $75: call [employer] line at [phone] to see if you are eligible    Follow-up appointment: [facility] schedule a follow up in 1 month if you get the Elfego and start to wear it so we can review the data OR if you have food questions.  Scheduling line: [phone]     Elizabeth Smlal RDN, LD, [facility]   [phone]

## 2024-01-31 NOTE — LETTER
1/31/2024         RE: Abhi Pabon  [address]  [place] [address]        Dear Colleague,    Thank you for referring your patient, Abhi Pabon, to the [facility]. Please see a copy of my visit note below.    Diabetes Self-Management Education & Support    Presents for: Individual review    Type of Service: In Person Visit      ASSESSMENT:  Patient arrived at 3:30pm but since was advised to get here at 3:15pm, was not immediately arrived and CDE did not have Teams in exam room so could not see message from  asking about this so visit started later. Reviewed recent A1C  and patient surprised this high. Felt metformin-xr was causing issues with hunger and feeling tired so did not initially go up to 2 tablets. He was not aware high blood glucose contribute to these symptoms and that his dose of Metformin was increased to 4 tablets daily (2000 mg) so will have him taper up to this by going up to 3 tablets (1500 mg) today and if tolerating, in 1 week, go up to 4 tablets (2000 mg). He was told ok to take all at once since on extended release or divide between AM and PM.    Discussed physiology of diabetes and purpose of checking blood glucose. Patient has dry fingers that crack easily so does not want to use meter to check blood glucose. Recent request for Dexcom denied but informed patient Elfego 3 limits cost to $75 a month. This is still too much for him so will order and see if he can buy one at a time or possibly save up and wear once sensor a month. Since he is unsure if/when he will get the sensor, he will call for a follow up in 1 month if able to buy and wear it.    Reviewed carbohydrate sources and instructed on carbohydrate counting and label reading.  Provided general recommendations for him to consume 30-60 gm carbs at meals and limit snacks to 15-30 grams.  Cautioned against under-consuming carbs since may cause liver to produce glucose,  leading to erratic blood glucose levels.  Illustrated the plate method and used food models to help demonstrate portion control.  Patient to receive additional education when returns for follow up.  Pt seems receptive and verbalized understanding of concepts discussed and recommendations provided.        Patient's most recent   Lab Results   Component Value Date    A1C 9.4 01/11/2024    A1C 7.7 01/26/2021     is not meeting goal of <7.0    Diabetes knowledge and skills assessment:   Patient is knowledgeable in diabetes management concepts related to: Taking Medication    Continue education with the following diabetes management concepts: Healthy Eating, Being Active, Monitoring, Taking Medication, Problem Solving, Reducing Risks, and Healthy Coping    Based on learning assessment above, most appropriate setting for further diabetes education would be: Individual setting.      PLAN    -Increase Metformin to 3 tablets (1500 mg) tonight. If tolerating in 1 week, go up to 4 tablets (2000 mg).  -See if it works to get the Elfego 3  -Try to spread out carbohydrate intake throughout the day  -Try to move around after dinner    Topics to cover at upcoming visits: Taking Medication, Problem Solving, and Reducing Risks    Follow-up: 1-2 months if gets Elfego    See Care Plan for co-developed, patient-state behavior change goals.  AVS provided for patient today.    Education Materials Provided:  [employer] Understanding Diabetes Booklet      SUBJECTIVE/OBJECTIVE:  Presents for: Individual review  Accompanied by: Self  Diabetes education in the past 24mo: No  Diabetes type: Type 2  Diabetes management related comments/concerns: Says he did not increase the Metformin in the past since her felt tired and hungry.  Transportation concerns: No  Difficulty affording diabetes medication?: No  Cultural Influences/Ethnic Background:  Not  or       Diabetes Symptoms & Complications:          Patient Problem List and  "Family Medical History reviewed for relevant medical history, current medical status, and diabetes risk factors.    Vitals:  There were no vitals taken for this visit.  Estimated body mass index is 36.4 kg/m  as calculated from the following:    Height as of 1/11/24: 1.768 m (5' 9.6\").    Weight as of 1/11/24: 113.8 kg (250 lb 12.8 oz).   Last 3 BP:   BP Readings from Last 3 Encounters:   01/11/24 (!) 134/90   10/02/23 (!) 152/90   09/05/23 130/78       History   Smoking Status     Never   Smokeless Tobacco     Never       Labs:  Lab Results   Component Value Date    A1C 9.4 01/11/2024    A1C 7.7 01/26/2021     Lab Results   Component Value Date     09/05/2023     07/18/2022     09/14/2020     Lab Results   Component Value Date    LDL 89 09/05/2023     05/13/2021     HDL Cholesterol   Date Value Ref Range Status   05/13/2021 60 >39 mg/dL Final     Direct Measure HDL   Date Value Ref Range Status   09/05/2023 43 >=40 mg/dL Final   ]  GFR Estimate   Date Value Ref Range Status   09/05/2023 89 >60 mL/min/1.73m2 Final   09/14/2020 >90 >60 mL/min/[1.73_m2] Final     Comment:     Non  GFR Calc  Starting 12/18/2018, serum creatinine based estimated GFR (eGFR) will be   calculated using the Chronic Kidney Disease Epidemiology Collaboration   (CKD-EPI) equation.       GFR Estimate If Black   Date Value Ref Range Status   09/14/2020 >90 >60 mL/min/[1.73_m2] Final     Comment:      GFR Calc  Starting 12/18/2018, serum creatinine based estimated GFR (eGFR) will be   calculated using the Chronic Kidney Disease Epidemiology Collaboration   (CKD-EPI) equation.       Lab Results   Component Value Date    CR 0.96 09/05/2023    CR 0.88 09/14/2020     No results found for: \"MICROALBUMIN\"    Healthy Eating:  Healthy Eating Assessed Today: Yes  Meals include: Breakfast, Lunch, Dinner  Breakfast: oatmeal  Lunch: None on days off OR sandwich, chips, apple, banana  Dinner: " chicken, chips, apple, banana  Snacks: Dark chocolate covered almonds (5-10)  Beverages: Water, Tea (Green tea)    Being Active:  Being Active Assessed Today: Yes  Exercise:: Yes  Days per week of moderate to strenuous exercise (like a brisk walk): 4 (active at work - 10-15 miles a day at work and 8-12 flights of stairs)    Monitoring:  Monitoring Assessed Today: Yes  Did patient bring glucose meter to appointment? : No  Times checking blood sugar at home (number): Never      Taking Medications:  Diabetes Medication(s)       Biguanides       metFORMIN (GLUCOPHAGE XR) 500 MG 24 hr tablet Take 4 tablets (2,000 mg) by mouth daily (with dinner)            Taking Medication Assessed Today: Yes  Current Treatments: Oral Medication (taken by mouth)  Problems taking diabetes medications regularly?: Yes (Thought he was only supposed to take 2 metformin a day.)    Problem Solving:  Problem Solving Assessed Today: No  Is the patient at risk for hypoglycemia?: No              Reducing Risks:  Reducing Risks Assessed Today: No    Healthy Coping:  Healthy Coping Assessed Today: Yes  Emotional response to diabetes: Ready to learn  Stage of change: PREPARATION (Decided to change - considering how)  Patient Activation Measure Survey Score:       No data to display                  Care Plan and Education Provided:  Care Plan: Diabetes   Updates made by Elizabeth Small RD since 1/31/2024 12:00 AM        Problem: HbA1C Not In Goal         Goal: Establish Regular Follow-Ups with PCP         Task: Discuss with PCP the recommended timing for patient's next follow up visit(s)    Responsible User: Elizabeth Small RD        Task: Discuss schedule for PCP visits with patient Completed 1/31/2024   Responsible User: Elizabeth Small RD        Goal: Get HbA1C Level in Goal         Task: Educate patient on diabetes education self-management topics    Responsible User: Elizabeth Small RD        Task: Educate patient on benefits of regular  glucose monitoring Completed 1/31/2024   Responsible User: Elizabeth Small RD        Task: Refer patient to appropriate extended care team member, as needed (Medication Therapy Management, Behavioral Health, Physical Therapy, etc.)    Responsible User: Elizabeth Small RD        Task: Discuss diabetes treatment plan with patient    Responsible User: Elizabeth Small RD        Problem: Diabetes Self-Management Education Needed to Optimize Self-Care Behaviors         Goal: Understand diabetes pathophysiology and disease progression         Task: Provide education on diabetes pathophysiology and disease progression specfic to patient's diabetes type Completed 1/31/2024   Responsible User: Elizabeth Small RD        Goal: Healthy Eating - follow a healthy eating pattern for diabetes         Task: Provide education on portion control and consistency in amount, composition and timing of food intake Completed 1/31/2024   Responsible User: Elizabeth Small RD        Task: Provide education on managing carbohydrate intake (carbohydrate counting, plate planning method, etc.) Completed 1/31/2024   Responsible User: Elizabeth Small RD        Task: Provide education on weight management    Responsible User: Elizabeth Small RD        Task: Provide education on heart healthy eating    Responsible User: Elizabeth Small RD        Task: Provide education on eating out    Responsible User: Elizabeth Small RD        Task: Develop individualized healthy eating plan with patient    Responsible User: Elizabeth Small RD        Goal: Being Active - get regular physical activity, working up to at least 150 minutes per week         Task: Provide education on relationship of activity to glucose and precautions to take if at risk for low glucose Completed 1/31/2024   Responsible User: Elizabeth Small RD        Task: Discuss barriers to physical activity with patient    Responsible User: Elizabeth Small RD        Task: Develop physical  activity plan with patient    Responsible User: Elizabeth Small RD        Task: Explore community resources including walking groups, assistance programs, and home videos    Responsible User: Elizabeth Small RD        Goal: Monitoring - monitor glucose and ketones as directed         Task: Provide education on blood glucose monitoring (purpose, proper technique, frequency, glucose targets, interpreting results, when to use glucose control solution, sharps disposal)    Responsible User: Elizabeth Small RD        Task: Provide education on continuous glucose monitoring (sensor placement, use of candi or /reader, understanding glucose trends, alerts and alarms, differences between sensor glucose and blood glucose) Completed 1/31/2024   Responsible User: Elizabeth Small RD        Task: Provide education on ketone monitoring (when to monitor, frequency, etc.)    Responsible User: Elizabeth Small RD        Goal: Taking Medication - patient is consistently taking medications as directed    This Visit's Progress: 0%   Note:    Increase metformin to 3 tablets (1500 mg) tonight and after 1 week, go up to 4 tablets a day (2000 mg) if tolerating.       Task: Provide education on action of prescribed medication, including when to take and possible side effects Completed 1/31/2024   Responsible User: Elizabeth Small RD        Task: Provide education on insulin and injectable diabetes medications, including administration, storage, site selection and rotation for injection sites    Responsible User: Elizabeth Small RD        Task: Discuss barriers to medication adherence with patient and provide management technique ideas as appropriate    Responsible User: Elizabeth Small RD        Task: Provide education on frequency and refill details of medications    Responsible User: Elizabeth Small RD        Goal: Problem Solving - know how to prevent and manage short-term diabetes complications         Task: Provide  education on high blood glucose - causes, signs/symptoms, prevention and treatment Completed 1/31/2024   Responsible User: Elizabeth Small RD        Task: Provide education on low blood glucose - causes, signs/symptoms, prevention, treatment, carrying a carbohydrate source at all times, and medical identification    Responsible User: Elizabeth Small RD        Task: Provide education on safe travel with diabetes    Responsible User: Elizabeth Small RD        Task: Provide education on how to care for diabetes on sick days    Responsible User: Elizbaeth Small RD        Task: Provide education on when to call a health care provider    Responsible User: Elizabeth Small RD        Goal: Reducing Risks - know how to prevent and treat long-term diabetes complications         Task: Provide education on major complications of diabetes, prevention, early diagnostic measures and treatment of complications    Responsible User: Elizabeth Small RD        Task: Provide education on recommended care for dental, eye and foot health    Responsible User: Elizabeth Small RD        Task: Provide education on Hemoglobin A1c - goals and relationship to blood glucose levels    Responsible User: Elizabeth Small RD        Task: Provide education on recommendations for heart health - lipid levels and goals, blood pressure and goals, and aspirin therapy, if indicated    Responsible User: Elizabeth Small RD        Task: Provide education on tobacco cessation    Responsible User: Elizabeth Small RD        Goal: Healthy Coping - use available resources to cope with the challenges of managing diabetes         Task: Discuss recognizing feelings about having diabetes    Responsible User: Elizabeth Small RD        Task: Provide education on the benefits of making appropriate lifestyle changes    Responsible User: Elizabeth Small RD        Task: Provide education on benefits of utilizing support systems    Responsible User: Elizabeth Small  RD        Task: Discuss methods for coping with stress    Responsible User: Elizabeth Small RD        Task: Provide education on when to seek professional counseling    Responsible User: Elizabeth Small RD Kaydee Brown, RDN, LD, [facility]     Time Spent: 45 minutes  Encounter Type: Individual    Any diabetes medication dose changes were made via the CDE Protocol per the patient's referring provider. A copy of this encounter was shared with the provider.

## 2024-02-02 ENCOUNTER — TRANSFERRED RECORDS (OUTPATIENT)
Dept: MULTI SPECIALTY CLINIC | Facility: CLINIC | Age: 64
End: 2024-02-02

## 2024-02-02 LAB — RETINOPATHY: NORMAL

## 2024-02-05 ENCOUNTER — TELEPHONE (OUTPATIENT)
Dept: FAMILY MEDICINE | Facility: CLINIC | Age: 64
End: 2024-02-05
Payer: COMMERCIAL

## 2024-02-05 DIAGNOSIS — E11.65 TYPE 2 DIABETES MELLITUS WITH HYPERGLYCEMIA, WITHOUT LONG-TERM CURRENT USE OF INSULIN (H): Primary | ICD-10-CM

## 2024-02-05 NOTE — TELEPHONE ENCOUNTER
Prior Authorization Retail Medication Request    Medication/Dose: Dexcom g6 needs pa  Diagnosis and ICD code (if different than what is on RX):  same   New/renewal/insurance change PA/secondary ins. PA:  Previously Tried and Failed:  unknown  Rationale:  same    Insurance   Primary: [facility]  Insurance ID:  [id number]    Secondary (if applicable):na  Insurance ID:  na    Pharmacy Information (if different than what is on RX)  Name:  same  Phone:  same  Fax:same

## 2024-02-07 ENCOUNTER — OFFICE VISIT (OUTPATIENT)
Dept: FAMILY MEDICINE | Facility: CLINIC | Age: 64
End: 2024-02-07
Payer: COMMERCIAL

## 2024-02-07 VITALS
DIASTOLIC BLOOD PRESSURE: 92 MMHG | SYSTOLIC BLOOD PRESSURE: 160 MMHG | OXYGEN SATURATION: 99 % | TEMPERATURE: 99.9 F | HEART RATE: 107 BPM | WEIGHT: 253 LBS | RESPIRATION RATE: 16 BRPM | BODY MASS INDEX: 36.72 KG/M2

## 2024-02-07 DIAGNOSIS — J02.9 ACUTE PHARYNGITIS, UNSPECIFIED ETIOLOGY: Primary | ICD-10-CM

## 2024-02-07 LAB
DEPRECATED S PYO AG THROAT QL EIA: NEGATIVE
FLUAV RNA SPEC QL NAA+PROBE: NEGATIVE
FLUBV RNA RESP QL NAA+PROBE: NEGATIVE
GROUP A STREP BY PCR: NOT DETECTED
RSV RNA SPEC NAA+PROBE: NEGATIVE
SARS-COV-2 RNA RESP QL NAA+PROBE: POSITIVE

## 2024-02-07 PROCEDURE — 99213 OFFICE O/P EST LOW 20 MIN: CPT | Performed by: STUDENT IN AN ORGANIZED HEALTH CARE EDUCATION/TRAINING PROGRAM

## 2024-02-07 PROCEDURE — 87651 STREP A DNA AMP PROBE: CPT | Performed by: STUDENT IN AN ORGANIZED HEALTH CARE EDUCATION/TRAINING PROGRAM

## 2024-02-07 PROCEDURE — 87637 SARSCOV2&INF A&B&RSV AMP PRB: CPT | Performed by: STUDENT IN AN ORGANIZED HEALTH CARE EDUCATION/TRAINING PROGRAM

## 2024-02-07 NOTE — PROGRESS NOTES
Assessment & Plan     Acute pharyngitis, unspecified etiology  Abhi is a 63-year-old male with type 2 diabetes, hypertension, dyslipidemia, CKD stage II and morbid obesity who presents with acute pharyngitis.  On examination, no significant findings other than tender lymphadenopathy in the anterior cervical chain bilaterally.  Strep rapid test was negative, PCR pending.  Also obtain influenza COVID and RSV PCR testing which is pending.  We discussed analgesics that will help to improve his pain over-the-counter, Tylenol, Motrin, Cepacol.  We discussed alarm symptoms such as drooling, muffled voice, shortness of breath, neck stiffness, fever over 102.4, or if unable to swallow food or water.  He reports understanding and will go to the emergency room if having the symptoms.    Clinical diagnosis: Acute viral pharyngitis    Should return to care if not improving after 7 days of symptoms.  Always free to contact us if there is significant worsening of symptoms at any point.  - Streptococcus A Rapid Screen w/Reflex to PCR - Clinic Collect  - Symptomatic Influenza A/B, RSV, & SARS-CoV2 PCR (COVID-19)  - Symptomatic Influenza A/B, RSV, & SARS-CoV2 PCR (COVID-19) Nose  - Group A Streptococcus PCR Throat Swab          Subjective   Abhi is a 63 year old, presenting for the following health issues:  URI (Sore throat, difficulty eating. Fever, cough, ongoing since 2 days.)        2/7/2024     3:14 PM   Additional Questions   Roomed by Viji ALFORD     URI    History of Present Illness       Reason for visit:  Strep    He eats 4 or more servings of fruits and vegetables daily.He exercises with enough effort to increase his heart rate 60 or more minutes per day.  He is missing 1 dose(s) of medications per week.       Reports difficulty swallowing for the past two days, hurts to eat food.  Can drink water, but still painful. Does feel like it got worse since yesterday. Feels otherwise stronger than yesterday. Had a headache  two days ago but this has resolved.  Has some tightness over the forehead but not painful.  Denies eyes or ear symptoms. Reports clear rhinorrhea. Reports coughing rarely but this is not a major symptom. Feels some soreness of his neck on the outside. Sore throat on the inside is on both sides.     Denies drooling, muffled voice. Denies shortness of breath, rigors, neck stiffness.     Has not had temperature over 100 F at home, however has felt feverish.               Review of Systems  Constitutional, HEENT, cardiovascular, pulmonary, gi and gu systems are negative, except as otherwise noted.      Objective    BP (!) 160/92 (BP Location: Right arm, Patient Position: Sitting, Cuff Size: Adult Large)   Pulse 107   Temp 99.9  F (37.7  C) (Oral)   Resp 16   Wt 114.8 kg (253 lb)   SpO2 99%   BMI 36.72 kg/m    Body mass index is 36.72 kg/m .  Physical Exam   GENERAL: alert and no distress  EYES: Eyes grossly normal to inspection, PERRL and conjunctivae and sclerae normal  HENT: ear canals and TM's normal, nose and mouth without ulcers or lesions.  Tonsils are difficult to visualize however what is visualized appears normal.  No exudate seen, no erythema.  NECK: Bilateral anterior cervical lymphadenopathy that is tender, no asymmetry, masses, or scars  MS: no gross musculoskeletal defects noted, no edema  SKIN: no suspicious lesions or rashes  NEURO: Normal strength and tone, mentation intact and speech normal  PSYCH: mentation appears normal, affect normal/bright    Results for orders placed or performed in visit on 02/07/24 (from the past 24 hour(s))   Streptococcus A Rapid Screen w/Reflex to PCR - Clinic Collect    Specimen: Throat; Swab   Result Value Ref Range    Group A Strep antigen Negative Negative           Signed Electronically by: Farooq Franco MD

## 2024-02-08 ENCOUNTER — TELEPHONE (OUTPATIENT)
Dept: FAMILY MEDICINE | Facility: CLINIC | Age: 64
End: 2024-02-08
Payer: COMMERCIAL

## 2024-02-08 ENCOUNTER — TELEPHONE (OUTPATIENT)
Dept: FAMILY MEDICINE | Facility: CLINIC | Age: 64
End: 2024-02-08

## 2024-02-08 DIAGNOSIS — U07.1 INFECTION DUE TO 2019 NOVEL CORONAVIRUS: Primary | ICD-10-CM

## 2024-02-08 NOTE — TELEPHONE ENCOUNTER
Left message regarding provider message. If/when patient calls, please relay provider message.  Federica Bray MA on 2/8/2024 at 12:59 PM

## 2024-02-14 NOTE — TELEPHONE ENCOUNTER
Central Prior Authorization Team   Phone: [phone]    PA Initiation    Medication: Freestyle Elfego 3 sensor   Insurance Company: [employer]Specialty PA's - Phone [phone] Fax [phone]  Pharmacy Filling the Rx: [facility] [place], [place] - [address] [place] [address] [place].  Filling Pharmacy Phone: [phone]  Filling Pharmacy Fax:    Start Date: 2/14/2024

## 2024-02-14 NOTE — TELEPHONE ENCOUNTER
PRIOR AUTHORIZATION DENIED    Medication: Freestyle Elfego 3 sensor     Denial Date: 2/14/2024    Denial Rational:                Appeal Information:    If you would like to appeal, please supply P/A team with a letter of medical necessity with clinical reason.

## 2024-02-16 NOTE — TELEPHONE ENCOUNTER
See encounter on 01/16/2024 for dexcom denial.       Continuous Blood Gluc  ([place] WILLAM 14 DAY READER) BREANNA   1 each09/29/20211/16/2024--Sig: Use to read blood sugars as per 's instructions.  Sent to pharmacy as:   FreeStyle Willam 14 Day [place] DeviceClass: E-PrescribeReason for Discontinue: Cost/Formulary changeOrder: [id number] Status: Receipt confirmed by pharmacy (9/29/2021  2:51 PM CDT)     Patient has been using the Free Style Willam in past.     Would an appeal need to be sent for patient to continue with the Willam.     Jennifer Aldrich CMA

## 2024-02-21 ENCOUNTER — TELEPHONE (OUTPATIENT)
Dept: FAMILY MEDICINE | Facility: CLINIC | Age: 64
End: 2024-02-21
Payer: COMMERCIAL

## 2024-02-21 ENCOUNTER — TELEPHONE (OUTPATIENT)
Dept: FAMILY MEDICINE | Facility: CLINIC | Age: 64
End: 2024-02-21

## 2024-02-21 RX ORDER — PROCHLORPERAZINE 25 MG/1
1 SUPPOSITORY RECTAL
Qty: 9 EACH | Refills: 5 | Status: SHIPPED | OUTPATIENT
Start: 2024-02-21 | End: 2024-05-21

## 2024-02-21 RX ORDER — PROCHLORPERAZINE 25 MG/1
1 SUPPOSITORY RECTAL ONCE
Qty: 1 EACH | Refills: 0 | Status: SHIPPED | OUTPATIENT
Start: 2024-02-21 | End: 2024-02-21

## 2024-02-21 RX ORDER — PROCHLORPERAZINE 25 MG/1
1 SUPPOSITORY RECTAL
Qty: 1 EACH | Refills: 1 | Status: SHIPPED | OUTPATIENT
Start: 2024-02-21 | End: 2024-05-21

## 2024-02-21 NOTE — TELEPHONE ENCOUNTER
Central Prior Authorization Team  Phone: [phone]    PA Initiation    Medication: DEXCOM G6 TRANSMITTER MISC  Insurance Company: [employer] - Phone [phone] Fax [phone]  Pharmacy Filling the Rx: [facility] - [place], [place] - [address] [place] [address].  Filling Pharmacy Phone:    Filling Pharmacy Fax:    Start Date: 2/21/2024

## 2024-02-21 NOTE — TELEPHONE ENCOUNTER
Central Prior Authorization Team  Phone: [phone]    PA Initiation    Medication: DEXCOM G6 SENSOR MISC  Insurance Company: [employer] - Phone [phone] Fax [phone]  Pharmacy Filling the Rx: [facility] [place] - [place], [place] - [address] [place] [address].  Filling Pharmacy Phone: [phone]  Filling Pharmacy Fax:    Start Date: 2/21/2024

## 2024-02-21 NOTE — TELEPHONE ENCOUNTER
"Reviewed 1/16/24 encounter that PA for Dexcom G7 was denied.  Reviewed 1/31/24 encounter that PA for Freestyle Elfego 3 was denied with comment stating \"The preferred product for your plan is Dexcom Continuous Glucose Monitor.\"    PA needed for Dexcom G6  Can this be routed to PA team?    Ines Monte, DNP, APRN, CNP   "

## 2024-02-21 NOTE — TELEPHONE ENCOUNTER
Central Prior Authorization Team  Phone: [phone]    PA Initiation    Medication: DEXCOM G6  BREANNA  Insurance Company: [employer] - Phone [phone] Fax [phone]  Pharmacy Filling the Rx: [facility] [place] - [place], [place] - [address] [place] [address].  Filling Pharmacy Phone:    Filling Pharmacy Fax:    Start Date: 2/21/2024

## 2024-02-22 NOTE — TELEPHONE ENCOUNTER
Central Prior Authorization Team  Phone: [phone]    PRIOR AUTHORIZATION DENIED    Medication: DEXCOM G6  BREANNA  Insurance Company: [employer] - Phone [phone] Fax [phone]  Denial Date: 2/21/2024  Denial Reason(s):         Appeal Information:         Patient Notified: no

## 2024-02-22 NOTE — TELEPHONE ENCOUNTER
Central Prior Authorization Team  Phone: [phone]    PRIOR AUTHORIZATION DENIED    Medication: DEXCOM G6 TRANSMITTER MISC  Insurance Company: [employer] - Phone [phone] Fax [phone]  Denial Date: 2/21/2024  Denial Reason(s):         Appeal Information:         Patient Notified: no

## 2024-02-22 NOTE — TELEPHONE ENCOUNTER
Central Prior Authorization Team  Phone: [phone]    PRIOR AUTHORIZATION DENIED    Medication: DEXCOM G6 SENSOR MISC  Insurance Company: [employer] - Phone [phone] Fax [phone]  Denial Date: 2/21/2024  Denial Reason(s):           Appeal Information:         Patient Notified: no

## 2024-02-23 NOTE — TELEPHONE ENCOUNTER
I have attempted to contact this patient by phone with the following iformation, : the Dexcom glucose meter has been Denied by your insurance company.  Message left on voicemail.     Jennifer Aldrich CMA

## 2024-04-09 DIAGNOSIS — I10 HYPERTENSION GOAL BP (BLOOD PRESSURE) < 140/90: ICD-10-CM

## 2024-04-10 RX ORDER — LISINOPRIL 10 MG/1
10 TABLET ORAL DAILY
Qty: 90 TABLET | Refills: 0 | Status: SHIPPED | OUTPATIENT
Start: 2024-04-10 | End: 2024-05-21

## 2024-05-05 ENCOUNTER — HEALTH MAINTENANCE LETTER (OUTPATIENT)
Age: 64
End: 2024-05-05

## 2024-05-21 ENCOUNTER — OFFICE VISIT (OUTPATIENT)
Dept: FAMILY MEDICINE | Facility: CLINIC | Age: 64
End: 2024-05-21
Attending: NURSE PRACTITIONER
Payer: COMMERCIAL

## 2024-05-21 VITALS
RESPIRATION RATE: 23 BRPM | TEMPERATURE: 98.3 F | BODY MASS INDEX: 36.48 KG/M2 | SYSTOLIC BLOOD PRESSURE: 150 MMHG | HEIGHT: 70 IN | HEART RATE: 92 BPM | OXYGEN SATURATION: 95 % | DIASTOLIC BLOOD PRESSURE: 90 MMHG | WEIGHT: 254.8 LBS

## 2024-05-21 DIAGNOSIS — I10 HYPERTENSION GOAL BP (BLOOD PRESSURE) < 140/90: ICD-10-CM

## 2024-05-21 DIAGNOSIS — E11.65 TYPE 2 DIABETES MELLITUS WITH HYPERGLYCEMIA, WITHOUT LONG-TERM CURRENT USE OF INSULIN (H): Primary | ICD-10-CM

## 2024-05-21 DIAGNOSIS — E78.5 DYSLIPIDEMIA: ICD-10-CM

## 2024-05-21 LAB
HOLD SPECIMEN: NORMAL

## 2024-05-21 PROCEDURE — 99214 OFFICE O/P EST MOD 30 MIN: CPT | Performed by: FAMILY MEDICINE

## 2024-05-21 PROCEDURE — 83036 HEMOGLOBIN GLYCOSYLATED A1C: CPT | Performed by: FAMILY MEDICINE

## 2024-05-21 PROCEDURE — 36415 COLL VENOUS BLD VENIPUNCTURE: CPT | Performed by: FAMILY MEDICINE

## 2024-05-21 RX ORDER — METFORMIN HCL 500 MG
1000 TABLET, EXTENDED RELEASE 24 HR ORAL
Qty: 180 TABLET | Refills: 3 | Status: SHIPPED | OUTPATIENT
Start: 2024-05-21 | End: 2024-05-22

## 2024-05-21 RX ORDER — LISINOPRIL 20 MG/1
20 TABLET ORAL DAILY
Qty: 90 TABLET | Refills: 3 | Status: SHIPPED | OUTPATIENT
Start: 2024-05-21

## 2024-05-21 RX ORDER — AMLODIPINE BESYLATE 10 MG/1
10 TABLET ORAL DAILY
Qty: 90 TABLET | Refills: 3 | Status: SHIPPED | OUTPATIENT
Start: 2024-05-21

## 2024-05-21 RX ORDER — ROSUVASTATIN CALCIUM 5 MG/1
5 TABLET, COATED ORAL AT BEDTIME
Qty: 90 TABLET | Refills: 3 | Status: SHIPPED | OUTPATIENT
Start: 2024-05-21

## 2024-05-21 NOTE — PROGRESS NOTES
"  Assessment & Plan     (E11.65) Type 2 diabetes mellitus with hyperglycemia, without long-term current use of insulin (H)  (primary encounter diagnosis)  Comment: A1c is pending.  Plan: Adult Eye  Referral, HEMOGLOBIN A1C,         metFORMIN (GLUCOPHAGE XR) 500 MG 24 hr tablet        He is only been taken Metformin 1000 mg a day.  Advised patient on diet, exercise, increase physical activity, weight loss    (E78.5) Dyslipidemia  Comment:   Plan: rosuvastatin (CRESTOR) 5 MG tablet        Diet modifications, weight loss    (I10) Hypertension goal BP (blood pressure) < 140/90  Comment:   Plan: lisinopril (ZESTRIL) 20 MG tablet, amLODIPine         (NORVASC) 10 MG tablet        Increased Lisinopril to 20 mg, continue with Amlodipine  Low salt diet  Weight loss, increase physical activities.       BMI  Estimated body mass index is 36.98 kg/m  as calculated from the following:    Height as of this encounter: 1.768 m (5' 9.6\").    Weight as of this encounter: 115.6 kg (254 lb 12.8 oz).   Weight management plan: Discussed healthy diet and exercise guidelines      Work on weight loss  Regular exercise    Constantino Moe is a 63 year old, presenting for the following health issues:  Diabetes  History of diabetes, hyperlipidemia, hypertension.  Patient reports he has been taking metformin 1000 mg a day,  Blood pressure been on the higher side, he denies headache, denies chest pain, no lower extremity edema.        5/21/2024     2:16 PM   Additional Questions   Roomed by gabe ibarra ma   Accompanied by self         5/21/2024     2:16 PM   Patient Reported Additional Medications   Patient reports taking the following new medications none       Via the Health Maintenance questionnaire, the patient has reported the following services have been completed -Eye Exam: [facility] 2024-02-02, this information has been sent to the abstraction team.  History of Present Illness       Diabetes:   He presents for follow up of " "diabetes.    He is not checking blood glucose.        He is concerned about other.   He is having weight gain.  The patient has had a diabetic eye exam in the last 12 months. Eye exam performed on [id number]. Location of last eye exam [facility].              Review of Systems  Constitutional, HEENT, cardiovascular, pulmonary, GI, , musculoskeletal, neuro, skin, endocrine and psych systems are negative, except as otherwise noted.      Objective    BP (!) 148/91 (BP Location: Right arm, Patient Position: Chair, Cuff Size: Adult Large)   Pulse 92   Temp 98.3  F (36.8  C) (Oral)   Resp 23   Ht 1.768 m (5' 9.6\")   Wt 115.6 kg (254 lb 12.8 oz)   SpO2 95%   BMI 36.98 kg/m    Body mass index is 36.98 kg/m .  Physical Exam   GENERAL: alert and no distress  RESP: lungs clear to auscultation - no rales, rhonchi or wheezes  CV: regular rate and rhythm, normal S1 S2, no S3 or S4, no murmur, click or rub, no peripheral edema  ABDOMEN: soft, nontender, no hepatosplenomegaly, no masses and bowel sounds normal  MS: no gross musculoskeletal defects noted, no edema  PSYCH: mentation appears normal, affect normal/bright    Orders Placed This Encounter   Procedures    HEMOGLOBIN A1C    Extra Tube    Extra Blue Top Tube    Extra Red Top Tube    Extra Green Top (Lithium Heparin) Tube    Extra Purple Top Tube    Adult Eye  Referral            Signed Electronically by: Amanda Osorio MD    "

## 2024-05-22 ENCOUNTER — TELEPHONE (OUTPATIENT)
Dept: FAMILY MEDICINE | Facility: CLINIC | Age: 64
End: 2024-05-22
Payer: COMMERCIAL

## 2024-05-22 DIAGNOSIS — E11.65 TYPE 2 DIABETES MELLITUS WITH HYPERGLYCEMIA, WITHOUT LONG-TERM CURRENT USE OF INSULIN (H): ICD-10-CM

## 2024-05-22 LAB — HBA1C MFR BLD: 8.1 %

## 2024-05-22 RX ORDER — METFORMIN HCL 500 MG
TABLET, EXTENDED RELEASE 24 HR ORAL
Qty: 270 TABLET | Refills: 3 | Status: SHIPPED | OUTPATIENT
Start: 2024-05-22

## 2024-05-22 NOTE — LETTER
May 29, 2024      Abhi JAIR Cm  [address] [facility] [address]  [place] [address]        Dear ,    We are writing to inform you of your test results.    Test results indicate you may require additional follow up, see comment below.    A1c still at 8.1.  Better.  Advised patient with diet, exercise, increase physical activity, weight loss.  Please have patient increase his metformin, take 2 tablets in the morning after breakfast.,  And take 1 tablet in the evening after dinner. Follow up in 6 months      Resulted Orders   Hemoglobin A1c   Result Value Ref Range    Hemoglobin A1C 8.1 (H) <5.7 %      Comment:      Normal <5.7%   Prediabetes 5.7-6.4%    Diabetes 6.5% or higher     Note: Adopted from ADA consensus guidelines.       If you have any questions or concerns, please call the clinic at the number listed above.       Sincerely,    XAVI Fofana    Triage Nurse  [facility]

## 2024-05-22 NOTE — TELEPHONE ENCOUNTER
RN left  for patient requesting call back to clinic.    RN called to relay providers message.    Eulalio Eden RN, BSN, PHN  [facility]

## 2024-05-22 NOTE — TELEPHONE ENCOUNTER
----- Message from Amanda Osorio MD sent at 5/22/2024  9:38 AM CDT -----  Please call patient with results.  A1c still at 8.1.  Better.  Advised patient with diet, exercise, increase physical activity, weight loss.  Please have patient increase his metformin, take 2 tablets in the morning after breakfast.,  And take 1 tablet in the evening after dinner.    Follow-up in 6 months

## 2024-05-29 NOTE — TELEPHONE ENCOUNTER
Pt has not returned call    Drafted letter and placed in outgoing mail      Ct RN    Triage Nurse  [facility]

## 2024-08-06 ENCOUNTER — PATIENT OUTREACH (OUTPATIENT)
Dept: CARE COORDINATION | Facility: CLINIC | Age: 64
End: 2024-08-06
Payer: COMMERCIAL

## 2024-08-20 ENCOUNTER — PATIENT OUTREACH (OUTPATIENT)
Dept: CARE COORDINATION | Facility: CLINIC | Age: 64
End: 2024-08-20
Payer: COMMERCIAL

## 2024-09-16 ENCOUNTER — NURSE TRIAGE (OUTPATIENT)
Dept: FAMILY MEDICINE | Facility: CLINIC | Age: 64
End: 2024-09-16
Payer: COMMERCIAL

## 2024-09-16 ENCOUNTER — VIRTUAL VISIT (OUTPATIENT)
Dept: FAMILY MEDICINE | Facility: CLINIC | Age: 64
End: 2024-09-16
Payer: COMMERCIAL

## 2024-09-16 DIAGNOSIS — U07.1 INFECTION DUE TO 2019 NOVEL CORONAVIRUS: ICD-10-CM

## 2024-09-16 DIAGNOSIS — U07.1 INFECTION DUE TO 2019 NOVEL CORONAVIRUS: Primary | ICD-10-CM

## 2024-09-16 PROCEDURE — 99441 PR PHYSICIAN TELEPHONE EVALUATION 5-10 MIN: CPT | Mod: 93 | Performed by: NURSE PRACTITIONER

## 2024-09-16 NOTE — PATIENT INSTRUCTIONS
Coronavirus (COVID-19): Care Instructions  What is COVID-19?  COVID-19 is a disease caused by a type of coronavirus. This illness was first found in 2019 and has since spread worldwide (pandemic). Symptoms can range from mild, such as fever and body aches, to severe, including trouble breathing. COVID-19 can be deadly.  Coronaviruses are a large group of viruses. Some types cause the common cold. Others cause more serious illnesses like Middle East respiratory syndrome (MERS) and severe acute respiratory syndrome (SARS).  Follow-up care is a key part of your treatment and safety. Be sure to make and go to all appointments, and call your doctor if you are having problems. It's also a good idea to know your test results and keep a list of the medicines you take.  How can you self-isolate when you have COVID-19?  If you have COVID-19, there are things you can do to help avoid spreading the virus to others.  Stay home, and avoid contact with other people.  Limit contact with people in your home. If possible, stay in a separate bedroom and use a separate bathroom.  Wear a high-quality mask when you are around other people.  Improve airflow. If you have to spend time indoors with others, open windows and doors. Or you can use a fan to blow air away from people and out a window.  Avoid contact with pets and other animals.  Cover your mouth and nose with a tissue when you cough or sneeze. Then throw it in the trash right away.  Wash your hands often, especially after you cough or sneeze. Use soap and water, and scrub for at least 20 seconds. If soap and water aren't available, use an alcohol-based hand .  Don't share personal household items. These include bedding, towels, cups and glasses, and eating utensils.  Wash laundry in the warmest water allowed for the fabric type, and dry it completely. It's okay to wash other people's laundry with yours.  Clean and disinfect your home. Use household  and  disinfectant wipes or sprays.  Go to the CDC website at cdc.gov if you have questions.  When can you end self-isolation for COVID-19?  If you know or think that you have the virus, you will need to self-isolate. When you can be around other people you live with and leave home depends on whether you have symptoms. Important: Day 0 is the day your symptoms started or the day you tested positive. Day 1 is the day after your symptoms first started or your test was positive.  If you tested positive but had no symptoms, it's safe to end isolation at the end of Day 5. But if you start to have symptoms, follow the recommendations below, and count your first day of symptoms as Day 0.  If you have symptoms, when you can end isolation depends on how sick you were and your overall health. No matter what, you need to wait until your symptoms are getting better and you haven't had a fever for 24 hours while not taking medicines to lower the fever. Here's how long to isolate, based on your symptoms:  If you were only a little sick: (This means you might have felt really bad but had no shortness of breath and never needed to be in the hospital.) You can end isolation at the end of Day 5.  If you were more sick: (You had some shortness of breath or some trouble breathing but never needed to be in the hospital.) You can end isolation at the end of Day 10.  If you were very sick and needed to be in the hospital, or if you have a weakened immune system: You can end isolation at the end of Day 10 or later. Talk to your doctor to find out when it's safe to end isolation. You may need a viral test.  After you end isolation, if your symptoms come back or get worse: Restart your isolation at Day 0. Do this even if it happens after you took medicine for COVID.  Avoid travel and stay away from people at high risk for serious disease for at least 10 days.  Those who can't wear a mask because they are under 2 years old or have certain  "disabilities should isolate for at least 10 full days.  Call your doctor or seek care if you have questions about your symptoms or when to end isolation.  Go to the CDC website at cdc.gov if you have questions.  Where can you learn more?  Go to https://www.[employer].net/patiented  Enter C007 in the search box to learn more about \"Coronavirus (COVID-19): Care Instructions.\"  Current as of: May 28, 2024  Content Version: 14.1 2006-2024 [employer].   Care instructions adapted under license by your healthcare professional. If you have questions about a medical condition or this instruction, always ask your healthcare professional. [employer] disclaims any warranty or liability for your use of this information.    "

## 2024-09-16 NOTE — TELEPHONE ENCOUNTER
Prescription sent to [facility]  Please attempt to call patient again and notify him.    Thanks,  Priscila Dillard, CNP

## 2024-09-16 NOTE — TELEPHONE ENCOUNTER
FYI - Status Update    Who is Calling: [facility] pharmacy    Update: Emir from [facility] pharmacy said Paxlovid prescription must have been sent to them by mistake as they do not fill outpatient medications unless it's for employees.     Does caller want a call/response back: No

## 2024-09-16 NOTE — TELEPHONE ENCOUNTER
"Patient calling, notes that he did test positive on at-home test today 09/16/24. Symptoms started yesterday. Writer attempted to complete RN protocol, patient continually interrupted patient and stated \"come on now quit with all these questions, I just want the medication because I know if works\", writer repeatedly told patient that RN needed to complete protocol with all questions answered in order for medication to be prescribed if appropriate, patient then started cutting writer off and saying \"no, no, no\" to all questions without letting writer continue as he \"wants script filled before traffic is bad\", writer did warn patient that if RN was unable to complete accurate documentation RN would not be able to prescribe. Patient states \"you all make it so difficult to get anything done\" writer apologized for frustration but noted that the protocol needs to be followed. Patient's Amlodipine gave interaction, writer unable to prescribe and signed patient up for telephone appointment with available provider to complete treatment if appropriate. Patient is agreeable but states \"well I've had it before and never had to go through this much trouble!\" Writer again apologized but noted that this is the requirement of [facility].    BREANNE Kapoor RN  [facility]        RN COVID TREATMENT VISIT  09/16/24      The patient has been triaged and does not require a higher level of care.    Abhi Pabon  64 year old  Current weight? 240 lbs    Has the patient been seen by a primary care provider at an [facility] or [facility] within the past two years? Yes.   Have you been in close proximity to/do you have a known exposure to a person with a confirmed case of influenza? No.     General treatment eligibility:  Date of positive COVID test (PCR or at home)?  09/16/24    Are you or have you been hospitalized for this COVID-19 infection? No.   Have you received monoclonal " antibodies or antiviral treatment for COVID-19 since this positive test? No.   Do you have any of the following conditions that place you at risk of being very sick from COVID-19?   - Age 50 years or older  - Heart conditions such as cardiomyopathies, congenital heart defects, coronary artery disease, heart arrhythmias, heart failure, hypertension, valve disorders   Yes, patient has at least one high risk condition as noted above.     Current COVID symptoms:   - new loss of taste or smell  Yes. Patient has at least one symptom as selected.     How many days since symptoms started? 5 days or less. Established patient, 12 years or older weighing at least 88.2 lbs, who has symptoms that started in the past 5 days, has not been hospitalized nor received treatment already, and is at risk for being very sick from COVID-19.     Treatment eligibility by RN:  Are you currently pregnant or nursing? No  Do you have a clinically significant hypersensitivity to nirmatrelvir or ritonavir, or toxic epidermal necrolysis (TEN) or Kendall-Jeff Syndrome? No  Do you have a history of hepatitis, any hepatic impairment on the Problem List (such as Child-Seaman Class C, cirrhosis, fatty liver disease, alcoholic liver disease), or was the last liver lab (hepatic panel, ALT, AST, ALK Phos, bilirubin) elevated in the past 6 months? No  Do you have any history of severe renal impairment (eGFR < 30mL/min)? No    Is patient eligible to continue? Yes, patient meets all eligibility requirements for the RN COVID treatment (as denoted by all no responses above).     Current Outpatient Medications   Medication Sig Dispense Refill    amLODIPine (NORVASC) 10 MG tablet Take 1 tablet (10 mg) by mouth daily 90 tablet 3    aspirin (ASA) 81 MG chewable tablet Take 162 mg by mouth daily      clobetasol (TEMOVATE) 0.05 % external ointment Apply 1 g topically daily Thin layer 180 g 6    lisinopril (ZESTRIL) 20 MG tablet Take 1 tablet (20 mg) by mouth daily  90 tablet 3    metFORMIN (GLUCOPHAGE XR) 500 MG 24 hr tablet 2 tab in AM and one tab at PM ( dose increased ) 270 tablet 3    rosuvastatin (CRESTOR) 5 MG tablet Take 1 tablet (5 mg) by mouth at bedtime 90 tablet 3       Medications from List 1 of the standing order (on medications that exclude the use of Paxlovid) that patient is taking: NONE. Is patient taking [place]'s Wort? No  Is patient taking Leora's Wort or any meds from List 1? No.   Medications from List 2 of the standing order (on meds that provider needs to adjust) that patient is taking: amlodipine (Norvasc), explained a provider visit is necessary to discuss medication adjustments while taking Paxlovid. Is patient on any of the meds from List 2? Yes. Patient will be scheduled or transferred to a  at the end of this call. Scheduled for telephone appointment with available provider.  Manasa Orellana RN        Reason for Disposition   COVID-19 diagnosed by positive lab test (e.g., PCR, rapid self-test kit) and mild symptoms (e.g., cough, fever, others) and no complications or SOB    Additional Information   Negative: SEVERE difficulty breathing (e.g., struggling for each breath, speaks in single words)   Negative: Difficult to awaken or acting confused (e.g., disoriented, slurred speech)   Negative: Bluish (or gray) lips or face now   Negative: Shock suspected (e.g., cold/pale/clammy skin, too weak to stand, low BP, rapid pulse)   Negative: Sounds like a life-threatening emergency to the triager   Negative: Diagnosed or suspected COVID-19 and symptoms lasting 3 or more weeks   Negative: COVID-19 exposure and no symptoms   Negative: COVID-19 vaccine reaction suspected (e.g., fever, headache, muscle aches) occurring 1 to 3 days after getting vaccine   Negative: COVID-19 vaccine, questions about   Negative: Lives with someone known to have influenza (flu test positive) and flu-like symptoms (e.g., cough, runny nose, sore throat, SOB; with or  without fever)   Negative: Possible COVID-19 symptoms and triager concerned about severity of symptoms or other causes   Negative: COVID-19 and breastfeeding, questions about   Negative: SEVERE or constant chest pain or pressure  (Exception: Mild central chest pain, present only when coughing.)   Negative: MODERATE difficulty breathing (e.g., speaks in phrases, SOB even at rest, pulse 100-120)   Negative: Headache and stiff neck (can't touch chin to chest)   Negative: Oxygen level (e.g., pulse oximetry) 90% or lower   Negative: Chest pain or pressure  (Exception: MILD central chest pain, present only when coughing.)   Negative: Drinking very little and dehydration suspected (e.g., no urine > 12 hours, very dry mouth, very lightheaded)   Negative: Patient sounds very sick or weak to the triager   Negative: MILD difficulty breathing (e.g., minimal/no SOB at rest, SOB with walking, pulse <100)   Negative: Fever > 103 F (39.4 C)   Negative: Fever > 101 F (38.3 C) and over 60 years of age   Negative: Fever > 100.0 F (37.8 C) and bedridden (e.g., CVA, chronic illness, recovering from surgery)   Negative: HIGH RISK patient (e.g., weak immune system, age > 64 years, obesity with BMI of 30 or higher, pregnant, chronic lung disease or other chronic medical condition) and COVID symptoms (e.g., cough, fever)  (Exceptions: Already seen by doctor or NP/PA and no new or worsening symptoms.)   Negative: HIGH RISK patient and influenza is widespread in the community and ONE OR MORE respiratory symptoms: cough, sore throat, runny or stuffy nose   Negative: HIGH RISK patient and influenza exposure within the last 7 days and ONE OR MORE respiratory symptoms: cough, sore throat, runny or stuffy nose   Negative: Oxygen level (e.g., pulse oximetry) 91 to 94%   Negative: COVID-19 infection suspected by caller or triager and mild symptoms (cough, fever, or others) and negative COVID-19 rapid test   Negative: Fever present > 3 days (72  "hours)   Negative: Fever returns after gone for over 24 hours and symptoms worse or not improved   Negative: Continuous (nonstop) coughing interferes with work or school and no improvement using cough treatment per Care Advice   Negative: Cough present > 3 weeks   Negative: COVID-19 diagnosed by positive lab test (e.g., PCR, rapid self-test kit) and NO symptoms (e.g., cough, fever, others)    Answer Assessment - Initial Assessment Questions  1. COVID-19 DIAGNOSIS: \"How do you know that you have COVID?\" (e.g., positive lab test or self-test, diagnosed by doctor or NP/PA, symptoms after exposure).      At home test 09/16/24  2. COVID-19 EXPOSURE: \"Was there any known exposure to COVID before the symptoms began?\" [facility] Definition of close contact: within 6 feet (2 meters) for a total of 15 minutes or more over a 24-hour period.       No  3. ONSET: \"When did the COVID-19 symptoms start?\"       9/15/24  4. WORST SYMPTOM: \"What is your worst symptom?\" (e.g., cough, fever, shortness of breath, muscle aches)      Sneezing, loss of appetite and taste, feels warm but unsure of fever  5. COUGH: \"Do you have a cough?\" If Yes, ask: \"How bad is the cough?\"        No cough, just sneezing  6. FEVER: \"Do you have a fever?\" If Yes, ask: \"What is your temperature, how was it measured, and when did it start?\"      No, unsure but has not checked temperature  7. RESPIRATORY STATUS: \"Describe your breathing?\" (e.g., normal; shortness of breath, wheezing, unable to speak)       No shortness of breath  8. BETTER-SAME-WORSE: \"Are you getting better, staying the same or getting worse compared to yesterday?\"  If getting worse, ask, \"In what way?\"      Worse  9. OTHER SYMPTOMS: \"Do you have any other symptoms?\"  (e.g., chills, fatigue, headache, loss of smell or taste, muscle pain, sore throat)      Sneezing  10. HIGH RISK DISEASE: \"Do you have any chronic medical problems?\" (e.g., asthma, heart or lung disease, weak immune system, obesity, " "etc.)        No, high BP  11. VACCINE: \"Have you had the COVID-19 vaccine?\" If Yes, ask: \"Which one, how many shots, when did you get it?\"        Yes  12. PREGNANCY: \"Is there any chance you are pregnant?\" \"When was your last menstrual period?\"        No  13. O2 SATURATION MONITOR:  \"Do you use an oxygen saturation monitor (pulse oximeter) at home?\" If Yes, ask \"What is your reading (oxygen level) today?\" \"What is your usual oxygen saturation reading?\" (e.g., 95%)        NA    Protocols used: Coronavirus (COVID-19) Diagnosed or [id number]    "

## 2024-09-16 NOTE — TELEPHONE ENCOUNTER
[facility] closed now. Patient requesting RX be sent somewhere else.He has never worked with [facility] so is afraid to fill there or [facility] which are open late. He wants to  know for sure if it is sent to one of those pharmacies that there will be no problems getting this tonight. I informed him I can send the RX but he will have to contact the pharmacy he chooses to make sure there are no issues with filling because of his insurance or availability of the medication. He is upset asking how people have been killed because of this system. He is upset Rx cannot be filled at [facility] as an output. In the end, he decided to wait until tomorrow and fill at [facility] where the Rx currently is.  Marilynn HERNANDEZ RN

## 2024-09-16 NOTE — TELEPHONE ENCOUNTER
Pt is calling stating he thinks he has covid and he wants paxlovid.     RN relayed we need a positive test. Pt will test and call back     Patient verbalized understanding and in agreement with plan of care.     Priscilla Chun RN

## 2024-09-16 NOTE — TELEPHONE ENCOUNTER
Patient had virtual visit for COVID today. Prescribed Paxlovid, however pharmacy states they do not fill outpatient medications.    Attempted to call patient x2 for other pharmacy, but no answer and had to leave a message.     Most frequented pharmacy in patient's med list is [facility] - [place], [place] - [address] [place] [address].     Please advise.     Melody MCCULLOUGH RN  [facility]  [phone]

## 2024-09-16 NOTE — TELEPHONE ENCOUNTER
Pt transferred to [place] for appointment from [place]. Pt transferred back to [facility] for appointment as pt is scheduled for 9/16/24 at 3:20pm for telephone call for concern below.     Kamala Chamberlain RN on 9/16/2024 at 3:20 PM

## 2024-09-16 NOTE — PROGRESS NOTES
Abhi is a 64 year old who is being evaluated via a billable telephone visit.    What phone number would you like to be contacted at? [phone]  How would you like to obtain your AVS? MyChart  Originating Location (pt. Location): Home    Distant Location (provider location):  On-site      Assessment & Plan     Infection due to 2019 novel coronavirus  Symptoms started 9/15/2024.  Discussed quarantine  Discussed return criteria.  Discussed treatment options. He would like Paxlovid.  Risks, benefits and side effects discussed.  Take 1/2 tablet of amlodipine while on Paxlovid.  Hold the rosuvastatin while on Paxlovid and for 5 additional days.  - nirmatrelvir and ritonavir (PAXLOVID) 300 mg/100 mg therapy pack; Take 3 tablets by mouth 2 times daily for 5 days. (Take 2 Nirmatrelvir tablets and 1 Ritonavir tablet twice daily for 5 days)    Follow up as needed    The risks, benefits and treatment options of prescribed medications or other treatments have been discussed with the patient. The patient verbalized their understanding and should call or follow up if no improvement or if they develop further problems.  Priscila Dillard, CNP                      Subjective   Abhi is a 64 year old, presenting for the following health issues:  Covid Concern      9/16/2024     3:24 PM   Additional Questions   Roomed by Unique Kraus CMA     HPI       COVID-19 Symptom Review  How many days ago did these symptoms start? 9/15/2024    Are any of the following symptoms significant for you?  New or worsening difficulty breathing? No  Worsening cough? No  Fever or chills? Yes, I felt feverish or had chills.  Headache: No  Sore throat: No  Chest pain: No  Diarrhea: No  Body aches? YES    What treatments has patient tried? None   Does patient live in a nursing home, group home, or shelter? No  Does patient have a way to get food/medications during quarantined? Yes, I have a friend or family member who can help me.    ** Positive  Covid test at home 1:00 pm           BP Readings from Last 6 Encounters:   05/21/24 (!) 150/90   02/07/24 (!) 160/92   01/11/24 (!) 134/90   10/02/23 (!) 152/90   09/05/23 130/78   07/18/22 137/84               Review of Systems  Constitutional, neuro, ENT, endocrine, pulmonary, cardiac, gastrointestinal, genitourinary, musculoskeletal, integument and psychiatric systems are negative, except as otherwise noted.      Objective           Vitals:  No vitals were obtained today due to virtual visit.    Physical Exam   General: Alert and no distress //Respiratory: No audible wheeze, cough, or shortness of breath // Psychiatric:  Appropriate affect, tone, and pace of words            Phone call duration: 10 minutes  Signed Electronically by: VEGA Miller CNP

## 2024-09-20 NOTE — TELEPHONE ENCOUNTER
Panel Management Review      Patient has the following on his problem list:     Diabetes    ASA: Not Required     Last A1C  Lab Results   Component Value Date    A1C 6.7 12/31/2019    A1C 6.3 07/03/2019    A1C 6.5 01/16/2019    A1C 6.1 10/18/2018    A1C 5.8 08/23/2018     A1C tested: Passed    Last LDL:    Lab Results   Component Value Date    CHOL 194 12/31/2019     Lab Results   Component Value Date    HDL 41 12/31/2019     Lab Results   Component Value Date     12/31/2019     Lab Results   Component Value Date    TRIG 121 12/31/2019     No results found for: CHOLHDLRATIO  Lab Results   Component Value Date    NHDL 153 12/31/2019       Is the patient on a Statin? NO             Is the patient on Aspirin? NO        Last three blood pressure readings:  BP Readings from Last 3 Encounters:   12/31/19 138/88   09/25/19 138/86   07/03/19 137/80       Date of last diabetes office visit: 12/31/2019     Tobacco History:     History   Smoking Status     Never Smoker   Smokeless Tobacco     Never Used         Hypertension   Last three blood pressure readings:  BP Readings from Last 3 Encounters:   12/31/19 138/88   09/25/19 138/86   07/03/19 137/80     Blood pressure: MONITOR    HTN Guidelines:  Less than 140/90      Composite cancer screening  Chart review shows that this patient is due/due soon for the following Fecal Colorectal (FIT)  Summary:    Patient is due/failing the following:   FIT and PHYSICAL    Action needed:   Patient needs office visit for Physical and return FIT.    Type of outreach:    Sent letter.    Questions for provider review:    None                                                                                                                                    Nyasia Harrison CMA      Chart routed to N/A.          
Guadalupe Yap (Resident)

## 2024-09-22 ENCOUNTER — HEALTH MAINTENANCE LETTER (OUTPATIENT)
Age: 64
End: 2024-09-22

## 2024-09-30 DIAGNOSIS — L30.9 ECZEMA, UNSPECIFIED TYPE: ICD-10-CM

## 2024-09-30 RX ORDER — CLOBETASOL PROPIONATE 0.5 MG/G
OINTMENT TOPICAL
Qty: 180 G | Refills: 6 | Status: SHIPPED | OUTPATIENT
Start: 2024-09-30

## 2024-12-01 ENCOUNTER — HEALTH MAINTENANCE LETTER (OUTPATIENT)
Age: 64
End: 2024-12-01

## 2025-01-12 ENCOUNTER — HEALTH MAINTENANCE LETTER (OUTPATIENT)
Age: 65
End: 2025-01-12

## 2025-03-04 ENCOUNTER — PATIENT OUTREACH (OUTPATIENT)
Dept: CARE COORDINATION | Facility: CLINIC | Age: 65
End: 2025-03-04
Payer: COMMERCIAL

## 2025-03-31 ENCOUNTER — OFFICE VISIT (OUTPATIENT)
Dept: FAMILY MEDICINE | Facility: CLINIC | Age: 65
End: 2025-03-31
Payer: COMMERCIAL

## 2025-03-31 VITALS
HEIGHT: 69 IN | TEMPERATURE: 97.7 F | SYSTOLIC BLOOD PRESSURE: 138 MMHG | WEIGHT: 237.4 LBS | DIASTOLIC BLOOD PRESSURE: 84 MMHG | OXYGEN SATURATION: 94 % | RESPIRATION RATE: 25 BRPM | BODY MASS INDEX: 35.16 KG/M2 | HEART RATE: 104 BPM

## 2025-03-31 DIAGNOSIS — Z00.00 ENCOUNTER FOR ANNUAL PHYSICAL EXAM: Primary | ICD-10-CM

## 2025-03-31 DIAGNOSIS — N18.2 CKD (CHRONIC KIDNEY DISEASE) STAGE 2, GFR 60-89 ML/MIN: ICD-10-CM

## 2025-03-31 DIAGNOSIS — E11.22 TYPE 2 DIABETES MELLITUS WITH CHRONIC KIDNEY DISEASE, WITHOUT LONG-TERM CURRENT USE OF INSULIN, UNSPECIFIED CKD STAGE (H): ICD-10-CM

## 2025-03-31 DIAGNOSIS — E78.5 DYSLIPIDEMIA: ICD-10-CM

## 2025-03-31 DIAGNOSIS — Z12.11 SCREEN FOR COLON CANCER: ICD-10-CM

## 2025-03-31 DIAGNOSIS — Z12.5 SCREENING FOR PROSTATE CANCER: ICD-10-CM

## 2025-03-31 DIAGNOSIS — L30.9 ECZEMA, UNSPECIFIED TYPE: ICD-10-CM

## 2025-03-31 DIAGNOSIS — E66.01 MORBID OBESITY (H): ICD-10-CM

## 2025-03-31 DIAGNOSIS — I10 HYPERTENSION GOAL BP (BLOOD PRESSURE) < 140/90: ICD-10-CM

## 2025-03-31 LAB
CREAT UR-MCNC: 46.9 MG/DL
EST. AVERAGE GLUCOSE BLD GHB EST-MCNC: 369 MG/DL
HBA1C MFR BLD: 14.5 % (ref 0–5.6)
HOLD SPECIMEN: NORMAL
MICROALBUMIN UR-MCNC: 214 MG/L
MICROALBUMIN/CREAT UR: 456.29 MG/G CR (ref 0–17)

## 2025-03-31 PROCEDURE — 82570 ASSAY OF URINE CREATININE: CPT | Performed by: FAMILY MEDICINE

## 2025-03-31 PROCEDURE — 80061 LIPID PANEL: CPT | Performed by: FAMILY MEDICINE

## 2025-03-31 PROCEDURE — G2211 COMPLEX E/M VISIT ADD ON: HCPCS | Performed by: FAMILY MEDICINE

## 2025-03-31 PROCEDURE — 3075F SYST BP GE 130 - 139MM HG: CPT | Performed by: FAMILY MEDICINE

## 2025-03-31 PROCEDURE — 82043 UR ALBUMIN QUANTITATIVE: CPT | Performed by: FAMILY MEDICINE

## 2025-03-31 PROCEDURE — 83036 HEMOGLOBIN GLYCOSYLATED A1C: CPT | Performed by: FAMILY MEDICINE

## 2025-03-31 PROCEDURE — 36415 COLL VENOUS BLD VENIPUNCTURE: CPT | Performed by: FAMILY MEDICINE

## 2025-03-31 PROCEDURE — G0103 PSA SCREENING: HCPCS | Performed by: FAMILY MEDICINE

## 2025-03-31 PROCEDURE — 80053 COMPREHEN METABOLIC PANEL: CPT | Performed by: FAMILY MEDICINE

## 2025-03-31 PROCEDURE — 99214 OFFICE O/P EST MOD 30 MIN: CPT | Mod: 25 | Performed by: FAMILY MEDICINE

## 2025-03-31 PROCEDURE — 99396 PREV VISIT EST AGE 40-64: CPT | Performed by: FAMILY MEDICINE

## 2025-03-31 PROCEDURE — 3079F DIAST BP 80-89 MM HG: CPT | Performed by: FAMILY MEDICINE

## 2025-03-31 RX ORDER — ASPIRIN 81 MG/1
81 TABLET, CHEWABLE ORAL DAILY
Qty: 90 TABLET | Refills: 3 | Status: SHIPPED | OUTPATIENT
Start: 2025-03-31

## 2025-03-31 RX ORDER — LISINOPRIL 20 MG/1
20 TABLET ORAL DAILY
Qty: 90 TABLET | Refills: 3 | Status: SHIPPED | OUTPATIENT
Start: 2025-03-31

## 2025-03-31 RX ORDER — ROSUVASTATIN CALCIUM 5 MG/1
5 TABLET, COATED ORAL AT BEDTIME
Qty: 90 TABLET | Refills: 3 | Status: SHIPPED | OUTPATIENT
Start: 2025-03-31

## 2025-03-31 RX ORDER — AMLODIPINE BESYLATE 10 MG/1
10 TABLET ORAL DAILY
Qty: 90 TABLET | Refills: 3 | Status: SHIPPED | OUTPATIENT
Start: 2025-03-31

## 2025-03-31 RX ORDER — METFORMIN HYDROCHLORIDE 500 MG/1
1000 TABLET, EXTENDED RELEASE ORAL 2 TIMES DAILY WITH MEALS
Qty: 360 TABLET | Refills: 3 | Status: SHIPPED | OUTPATIENT
Start: 2025-03-31

## 2025-03-31 RX ORDER — CLOBETASOL PROPIONATE 0.5 MG/G
OINTMENT TOPICAL 2 TIMES DAILY
Qty: 180 G | Refills: 6 | Status: SHIPPED | OUTPATIENT
Start: 2025-03-31

## 2025-03-31 SDOH — HEALTH STABILITY: PHYSICAL HEALTH: ON AVERAGE, HOW MANY DAYS PER WEEK DO YOU ENGAGE IN MODERATE TO STRENUOUS EXERCISE (LIKE A BRISK WALK)?: 5 DAYS

## 2025-03-31 SDOH — HEALTH STABILITY: PHYSICAL HEALTH: ON AVERAGE, HOW MANY MINUTES DO YOU ENGAGE IN EXERCISE AT THIS LEVEL?: 30 MIN

## 2025-03-31 ASSESSMENT — SOCIAL DETERMINANTS OF HEALTH (SDOH): HOW OFTEN DO YOU GET TOGETHER WITH FRIENDS OR RELATIVES?: THREE TIMES A WEEK

## 2025-03-31 NOTE — PROGRESS NOTES
Preventive Care Visit  [facility]  Amanda Osorio MD, Family Medicine  Mar 31, 2025      Assessment & Plan     Encounter for annual physical exam   Discussed diet, exercise, wellness and other preventive recommendations related to health maintenance.   Follow up as needed for acute issues.     Physical exam recommended in one year.     - REVIEW OF HEALTH MAINTENANCE PROTOCOL ORDERS  - Comprehensive metabolic panel (BMP + Alb, Alk Phos, ALT, AST, Total. Bili, TP); Future    Dyslipidemia    - Lipid panel reflex to direct LDL Fasting; Future  - rosuvastatin (CRESTOR) 5 MG tablet; Take 1 tablet (5 mg) by mouth at bedtime.    CKD (chronic kidney disease) stage 2, GFR 60-89 ml/min    - Albumin Random Urine Quantitative with Creat Ratio    Screen for colon cancer  Screening.  - COLOGUARD(EXACT SCIENCES); Future    Screening for prostate cancer  screening  - PSA, screen; Future    Type 2 diabetes mellitus with chronic kidney disease, without long-term current use of insulin, unspecified CKD stage (H)  A1c over 14.5  He has been taking only metformin 1 tablet a day, sometimes 1 tablet twice daily.  He has been seeing a nutritionist who advised him to take Cinnamon 500 mg twice a day.    Advised patient with diet, increase physical activity, portion control, avoid carbohydrate diet, sugary or sweet drinks.    I increased his metformin to 1000 mg twice a day.  Added Jardiance.  Patient will follow-up in 3 to 4 months    - Hemoglobin A1c  - Comprehensive metabolic panel (BMP + Alb, Alk Phos, ALT, AST, Total. Bili, TP); Future  - PRIMARY CARE FOLLOW-UP SCHEDULING; Future  - FOOT EXAM  - metFORMIN (GLUCOPHAGE XR) 500 MG 24 hr tablet; Take 2 tablets (1,000 mg) by mouth 2 times daily (with meals). 2 tab in AM and one tab at PM ( dose increased )  - empagliflozin (JARDIANCE) 25 MG TABS tablet; Take 1 tablet (25 mg) by mouth daily.  - aspirin (ASA) 81 MG chewable tablet; Take 1 tablet (81 mg) by mouth  "daily.    Morbid obesity (H)  Discussed diet and weight loss    Hypertension goal BP (blood pressure) < 140/90    - amLODIPine (NORVASC) 10 MG tablet; Take 1 tablet (10 mg) by mouth daily.  - lisinopril (ZESTRIL) 20 MG tablet; Take 1 tablet (20 mg) by mouth daily.    Eczema, unspecified type  Use as needed.  - clobetasol (TEMOVATE) 0.05 % external ointment; Apply topically 2 times daily.    Patient has been advised of split billing requirements and indicates understanding: Yes        BMI  Estimated body mass index is 34.57 kg/m  as calculated from the following:    Height as of this encounter: 1.765 m (5' 9.49\").    Weight as of this encounter: 107.7 kg (237 lb 6.4 oz).   Weight management plan: Discussed healthy diet and exercise guidelines    Counseling  Appropriate preventive services were addressed with this patient via screening, questionnaire, or discussion as appropriate for fall prevention, nutrition, physical activity, Tobacco-use cessation, social engagement, weight loss and cognition.  Checklist reviewing preventive services available has been given to the patient.  Reviewed patient's diet, addressing concerns and/or questions.       Work on weight loss  Regular exercise    Constantino Moe is a 64 year old, presenting for the following:  Physical  History of diabetes, blood sugars been on the higher side.  Denies numbness or tingling in his feet.  He does have eczema on his feet bilaterally.    Patient remains active he continues to work full-time.    Patient reports he has been seeing a nutritionist who advised him to take Cinnamon  he has been taking 500 mg twice daily.  He has only been taking metformin 1 tablet sometimes once daily sometimes twice daily.        3/31/2025     4:42 PM   Additional Questions   Roomed by gabe ibarra ma   Accompanied by self         3/31/2025     4:42 PM   Patient Reported Additional Medications   Patient reports taking the following new medications none    "       History of Present Illness       Diabetes:   He presents for follow up of diabetes.    He is not checking blood glucose.         He has no concerns regarding his diabetes at this time.  He is having burning in feet.  The patient has had a diabetic eye exam in the last 12 months. Eye exam performed on 012024. [facility]            Hyperlipidemia Follow-Up    Are you regularly taking any medication or supplement to lower your cholesterol?   Yes- statin  Are you having muscle aches or other side effects that you think could be caused by your cholesterol lowering medication?  No      Advance Care Planning  Patient does not have a Health Care Directive: Discussed advance care planning with patient; information given to patient to review.      3/31/2025   General Health   How would you rate your overall physical health? Good   Feel stress (tense, anxious, or unable to sleep) Only a little   (!) STRESS CONCERN      3/31/2025   Nutrition   Three or more servings of calcium each day? Yes   Diet: Gluten-free/reduced   How many servings of fruit and vegetables per day? 4 or more   How many sweetened beverages each day? 0-1         3/31/2025   Exercise   Days per week of moderate/strenous exercise 5 days   Average minutes spent exercising at this level 30 min         3/31/2025   Social Factors   Frequency of gathering with friends or relatives Three times a week   Worry food won't last until get money to buy more No   Food not last or not have enough money for food? No   Do you have housing? (Housing is defined as stable permanent housing and does not include staying ouside in a car, in a tent, in an abandoned building, in an overnight shelter, or couch-surfing.) Yes   Are you worried about losing your housing? No   Lack of transportation? No   Unable to get utilities (heat,electricity)? No         3/31/2025   Fall Risk   Fallen 2 or more times in the past year? No   Trouble with walking or balance?  No          3/31/2025   Dental   Dentist two times every year? Yes           Today's PHQ-2 Score:       3/31/2025     4:40 PM   PHQ-2 (  [employer])   Q1: Little interest or pleasure in doing things 0   Q2: Feeling down, depressed or hopeless 0   PHQ-2 Score 0    Q1: Little interest or pleasure in doing things Not at all   Q2: Feeling down, depressed or hopeless Not at all   PHQ-2 Score 0       Patient-reported           3/31/2025   Substance Use   Alcohol more than 3/day or more than 7/wk No   Do you use any other substances recreationally? No     Social History     Tobacco Use    Smoking status: Never     Passive exposure: Never    Smokeless tobacco: Never   Vaping Use    Vaping status: Never Used   Substance Use Topics    Alcohol use: Yes    Drug use: No           3/31/2025   STI Screening   New sexual partner(s) since last STI/HIV test? No   Last PSA:   PSA   Date Value Ref Range Status   2018 0.68 0 - 4 ug/L Final     Comment:     Assay Method:  Chemiluminescence using Siemens Vista analyzer     Prostate Specific Antigen Screen   Date Value Ref Range Status   2023 0.40 0.00 - 4.50 ng/mL Final   2021 0.50 0.00 - 4.00 ug/L Final     ASCVD Risk   The 10-year ASCVD risk score (Becky MAHMOOD, et al., 2019) is: 28.3%    Values used to calculate the score:      Age: 64 years      Sex: Male      Is Non- : No      Diabetic: Yes      Tobacco smoker: No      Systolic Blood Pressure: 146 mmHg      Is BP treated: Yes      HDL Cholesterol: 43 mg/dL      Total Cholesterol: 159 mg/dL    Fracture Risk Assessment Tool  Link to Frax Calculator  Use the information below to complete the Frax calculator  : 1960  Sex: male  Weight (kg): 107.7 kg (actual weight)  Height (cm): 176.5 cm  Previous Fragility Fracture:  No  History of parent with fractured hip:  No  Current Smoking:  No  Patient has been on glucocorticoids for more than 3 months (5mg/day or more): No  Rheumatoid  "Arthritis on Problem List:  No  Secondary Osteoporosis on Problem List:  No  Consumes 3 or more units of alcohol per day: No  Femoral Neck BMD (g/cm2)           Reviewed and updated as needed this visit by Provider                    Past Medical History:   Diagnosis Date    CKD (chronic kidney disease) stage 2, GFR 60-89 ml/min 01/14/2024    Diabetes mellitus, type 2 (H)     Dyslexia     Dyslipidemia 07/18/2022    Hypertension     Hypertension goal BP (blood pressure) < 140/90 05/21/2018    Morbid obesity (H) 11/17/2020    Type 2 diabetes mellitus with hyperglycemia, without long-term current use of insulin (H)      No past surgical history on file.  OB History   No obstetric history on file.         Review of Systems  Constitutional, HEENT, cardiovascular, pulmonary, GI, , musculoskeletal, neuro, skin, endocrine and psych systems are negative, except as otherwise noted.     Objective    Exam  BP (!) 146/82 (BP Location: Right arm, Patient Position: Sitting, Cuff Size: Adult Large)   Pulse 104   Temp 97.7  F (36.5  C) (Oral)   Resp 25   Ht 1.765 m (5' 9.49\")   Wt 107.7 kg (237 lb 6.4 oz)   SpO2 94%   BMI 34.57 kg/m     Estimated body mass index is 34.57 kg/m  as calculated from the following:    Height as of this encounter: 1.765 m (5' 9.49\").    Weight as of this encounter: 107.7 kg (237 lb 6.4 oz).    Physical Exam  GENERAL: alert and no distress  EYES: Eyes grossly normal to inspection, PERRL and conjunctivae and sclerae normal  HENT: ear canals and TM's normal, nose and mouth without ulcers or lesions  NECK: no adenopathy, no asymmetry, masses, or scars  RESP: lungs clear to auscultation - no rales, rhonchi or wheezes  CV: regular rate and rhythm, normal S1 S2, no S3 or S4, no murmur, click or rub, no peripheral edema  ABDOMEN: soft, nontender, no hepatosplenomegaly, no masses and bowel sounds normal  MS: no gross musculoskeletal defects noted, no edema  SKIN: no suspicious lesions or rashes  NEURO: " Normal strength and tone, mentation intact and speech normal  PSYCH: mentation appears normal, affect normal/bright  Diabetic foot exam: normal DP and PT pulses, no trophic changes or ulcerative lesions, normal sensory exam, normal monofilament exam, and eczema and dry on both feet  No sores, and no open sores.    Lab Results   Component Value Date    A1C 14.5 03/31/2025    A1C 8.1 05/21/2024    A1C 9.4 01/11/2024    A1C 8.7 09/05/2023    A1C 7.7 07/18/2022    A1C 7.7 01/26/2021    A1C 6.3 09/14/2020    A1C 6.7 12/31/2019    A1C 6.3 07/03/2019    A1C 6.5 01/16/2019     Orders Placed This Encounter   Procedures    REVIEW OF HEALTH MAINTENANCE PROTOCOL ORDERS    FOOT EXAM    PSA, screen    Lipid panel reflex to direct LDL Fasting    Hemoglobin A1c    Albumin Random Urine Quantitative with Creat Ratio    Extra Tube    Extra Blue Top Tube    Extra Red Top Tube    Extra Green Top (Lithium Heparin) Tube    Extra Purple Top Tube    Comprehensive metabolic panel (BMP + Alb, Alk Phos, ALT, AST, Total. Bili, TP)    COLOGUARD(EXACT SCIENCES)            Signed Electronically by: Amanda Osorio MD

## 2025-04-01 ENCOUNTER — TELEPHONE (OUTPATIENT)
Dept: FAMILY MEDICINE | Facility: CLINIC | Age: 65
End: 2025-04-01
Payer: COMMERCIAL

## 2025-04-01 LAB
ALBUMIN SERPL BCG-MCNC: 4.4 G/DL (ref 3.5–5.2)
ALP SERPL-CCNC: 73 U/L (ref 40–150)
ALT SERPL W P-5'-P-CCNC: 31 U/L (ref 0–70)
ANION GAP SERPL CALCULATED.3IONS-SCNC: 11 MMOL/L (ref 7–15)
AST SERPL W P-5'-P-CCNC: 23 U/L (ref 0–45)
BILIRUB SERPL-MCNC: 0.5 MG/DL
BUN SERPL-MCNC: 12 MG/DL (ref 8–23)
CALCIUM SERPL-MCNC: 10.1 MG/DL (ref 8.8–10.4)
CHLORIDE SERPL-SCNC: 95 MMOL/L (ref 98–107)
CHOLEST SERPL-MCNC: 182 MG/DL
CREAT SERPL-MCNC: 0.86 MG/DL (ref 0.67–1.17)
EGFRCR SERPLBLD CKD-EPI 2021: >90 ML/MIN/1.73M2
GLUCOSE SERPL-MCNC: 330 MG/DL (ref 70–99)
HCO3 SERPL-SCNC: 26 MMOL/L (ref 22–29)
HDLC SERPL-MCNC: 37 MG/DL
LDLC SERPL CALC-MCNC: 99 MG/DL
NONHDLC SERPL-MCNC: 145 MG/DL
POTASSIUM SERPL-SCNC: 4.1 MMOL/L (ref 3.4–5.3)
PROT SERPL-MCNC: 7.6 G/DL (ref 6.4–8.3)
PSA SERPL DL<=0.01 NG/ML-MCNC: 0.36 NG/ML (ref 0–4.5)
SODIUM SERPL-SCNC: 132 MMOL/L (ref 135–145)
TRIGL SERPL-MCNC: 231 MG/DL

## 2025-04-01 NOTE — TELEPHONE ENCOUNTER
Called and left message for patient to return call to clinic    XAVI Fofana    Triage Nurse  [facility]          ----- Message from Amanda Osorio sent at 4/1/2025  4:56 PM CDT -----  Please call pt with his results    Normal for PSA    Normal for Kidney and liver functions.    Low sodium and high sugar, increase protein in urine, 2nd to high blood sugar.    Advise pt to monitor his blood sugar, weight loss  Continue with current medicine    Follow up in 3- 4 months.    Total Cholesterol normal, and LDL normal  Continue with current Crestor, one tab qhs    Normal for PSA< prostate cancer screening > normal

## 2025-04-01 NOTE — LETTER
April 7, 2025      Abhi Pabon  [address]  [place] [address]        Dear AshleyCm,    We are writing to inform you of your test results.    Test results indicate you may require additional follow up, see comment below fro Dr. Osorio.        Please call pt with his results     Normal for PSA     Normal for Kidney and liver functions.     Low sodium and high sugar, increase protein in urine, 2nd to high blood sugar.     Advise pt to monitor his blood sugar, weight loss  Continue with current medicine     Follow up in 3- 4 months.     Total Cholesterol normal, and LDL normal  Continue with current Crestor, one tab qhs     Normal for PSA< prostate cancer screening > normal       Resulted Orders   Hemoglobin A1c   Result Value Ref Range    Estimated Average Glucose 369 (H) <117 mg/dL    Hemoglobin A1C 14.5 (H) 0.0 - 5.6 %      Comment:      Normal <5.7%   Prediabetes 5.7-6.4%    Diabetes 6.5% or higher     Note: Adopted from ADA consensus guidelines.    Narrative    Result confirmed by repeat test.       Albumin Random Urine Quantitative with Creat Ratio   Result Value Ref Range    Creatinine Urine mg/dL 46.9 mg/dL      Comment:      The reference ranges have not been established in urine creatinine. The results should be integrated into the clinical context for interpretation.    Albumin Urine mg/L 214.0 mg/L      Comment:      The reference ranges have not been established in urine albumin. The results should be integrated into the clinical context for interpretation.    Albumin Urine mg/g Cr 456.29 (H) 0.00 - 17.00 mg/g Cr      Comment:      Microalbuminuria is defined as an albumin:creatinine ratio of 17 to 299 for males and 25 to 299 for females. A ratio of albumin:creatinine of 300 or higher is indicative of overt proteinuria.  Due to biologic variability, positive results should be confirmed by a second, first-morning random or 24-hour timed urine specimen. If there is discrepancy, a  third specimen is recommended. When 2 out of 3 results are in the microalbuminuria range, this is evidence for incipient nephropathy and warrants increased efforts at glucose control, blood pressure control, and institution of therapy with an angiotensin-converting-enzyme (ACE) inhibitor (if the patient can tolerate it).     Comprehensive metabolic panel (BMP + Alb, Alk Phos, ALT, AST, Total. Bili, TP)   Result Value Ref Range    Sodium 132 (L) 135 - 145 mmol/L    Potassium 4.1 3.4 - 5.3 mmol/L    Carbon Dioxide (CO2) 26 22 - 29 mmol/L    Anion Gap 11 7 - 15 mmol/L    Urea Nitrogen 12.0 8.0 - 23.0 mg/dL    Creatinine 0.86 0.67 - 1.17 mg/dL    GFR Estimate >90 >60 mL/min/1.73m2      Comment:      eGFR calculated using 2021 CKD-EPI equation.    Calcium 10.1 8.8 - 10.4 mg/dL    Chloride 95 (L) 98 - 107 mmol/L    Glucose 330 (H) 70 - 99 mg/dL    Alkaline Phosphatase 73 40 - 150 U/L    AST 23 0 - 45 U/L    ALT 31 0 - 70 U/L    Protein Total 7.6 6.4 - 8.3 g/dL    Albumin 4.4 3.5 - 5.2 g/dL    Bilirubin Total 0.5 <=1.2 mg/dL   Lipid panel reflex to direct LDL Fasting   Result Value Ref Range    Cholesterol 182 <200 mg/dL    Triglycerides 231 (H) <150 mg/dL    Direct Measure HDL 37 (L) >=40 mg/dL    LDL Cholesterol Calculated 99 <100 mg/dL    Non HDL Cholesterol 145 (H) <130 mg/dL    Narrative    Cholesterol  Desirable: < 200 mg/dL  Borderline High: 200 - 239 mg/dL  High: >= 240 mg/dL    Triglycerides  Normal: < 150 mg/dL  Borderline High: 150 - 199 mg/dL  High: 200-499 mg/dL  Very High: >= 500 mg/dL    Direct Measure HDL  Female: >= 50 mg/dL   Male: >= 40 mg/dL    LDL Cholesterol  Desirable: < 100 mg/dL  Above Desirable: 100 - 129 mg/dL   Borderline High: 130 - 159 mg/dL   High:  160 - 189 mg/dL   Very High: >= 190 mg/dL    Non HDL Cholesterol  Desirable: < 130 mg/dL  Above Desirable: 130 - 159 mg/dL  Borderline High: 160 - 189 mg/dL  High: 190 - 219 mg/dL  Very High: >= 220 mg/dL   PSA, screen   Result Value Ref Range     Prostate Specific Antigen Screen 0.36 0.00 - 4.50 ng/mL    Narrative    This result is obtained using the Roche Elecsys total PSA method on the elvis e801 immunoassay analyzer, which is an ultrasensitive method. Results obtained with different assay methods or kits cannot be used interchangeably.  This test is intended for initial prostate cancer screening. PSA values exceeding the age-specific limits are suspicious for prostate disease, but additional testing, such as prostate biopsy, is needed to diagnose prostate pathology. The American Cancer Society recommends annual examination with digital rectal examination and serum PSA beginning at age 50 and for men with a life expectancy of at least 10 years after detection of prostate cancer. For men in high-risk groups, such as  Americans or men with a first-degree relative diagnosed at a younger age, testing should begin at a younger age. It is generally recommended that information be provided to patients about the benefits and limitations of testing and treatment so they can make informed decisions.       If you have any questions or concerns, please call the clinic at the number listed above.       Sincerely,      Emir HURLEY    [facility].        Electronically signed

## 2025-04-02 NOTE — TELEPHONE ENCOUNTER
Attempt #2 to call patient.     RN left voicemail and requested return call to [facility] at [phone].     BREANNE Monroe  [place] Triage RN  [facility]

## 2025-06-02 ENCOUNTER — PATIENT OUTREACH (OUTPATIENT)
Dept: CARE COORDINATION | Facility: CLINIC | Age: 65
End: 2025-06-02
Payer: COMMERCIAL

## 2025-07-19 ENCOUNTER — HEALTH MAINTENANCE LETTER (OUTPATIENT)
Age: 65
End: 2025-07-19

## 2025-08-13 ENCOUNTER — TELEPHONE (OUTPATIENT)
Dept: FAMILY MEDICINE | Facility: CLINIC | Age: 65
End: 2025-08-13
Payer: COMMERCIAL

## 2025-08-13 DIAGNOSIS — L03.011 CELLULITIS OF FINGER OF RIGHT HAND: Primary | ICD-10-CM

## 2025-08-14 RX ORDER — DOXYCYCLINE 100 MG/1
100 CAPSULE ORAL 2 TIMES DAILY
Qty: 20 CAPSULE | Refills: 0 | Status: SHIPPED | OUTPATIENT
Start: 2025-08-14

## 2025-08-19 ENCOUNTER — PATIENT OUTREACH (OUTPATIENT)
Dept: CARE COORDINATION | Facility: CLINIC | Age: 65
End: 2025-08-19
Payer: COMMERCIAL

## 2025-09-01 ENCOUNTER — PATIENT OUTREACH (OUTPATIENT)
Dept: CARE COORDINATION | Facility: CLINIC | Age: 65
End: 2025-09-01
Payer: COMMERCIAL